# Patient Record
Sex: MALE | Race: WHITE | URBAN - METROPOLITAN AREA
[De-identification: names, ages, dates, MRNs, and addresses within clinical notes are randomized per-mention and may not be internally consistent; named-entity substitution may affect disease eponyms.]

---

## 2018-02-27 ENCOUNTER — APPOINTMENT (RX ONLY)
Dept: URBAN - METROPOLITAN AREA CLINIC 23 | Facility: CLINIC | Age: 73
Setting detail: DERMATOLOGY
End: 2018-02-27

## 2018-02-27 DIAGNOSIS — L738 OTHER SPECIFIED DISEASES OF HAIR AND HAIR FOLLICLES: ICD-10-CM

## 2018-02-27 DIAGNOSIS — L663 OTHER SPECIFIED DISEASES OF HAIR AND HAIR FOLLICLES: ICD-10-CM

## 2018-02-27 DIAGNOSIS — L73.9 FOLLICULAR DISORDER, UNSPECIFIED: ICD-10-CM

## 2018-02-27 DIAGNOSIS — D22 MELANOCYTIC NEVI: ICD-10-CM

## 2018-02-27 DIAGNOSIS — L82.0 INFLAMED SEBORRHEIC KERATOSIS: ICD-10-CM

## 2018-02-27 PROBLEM — J45.909 UNSPECIFIED ASTHMA, UNCOMPLICATED: Status: ACTIVE | Noted: 2018-02-27

## 2018-02-27 PROBLEM — L85.3 XEROSIS CUTIS: Status: ACTIVE | Noted: 2018-02-27

## 2018-02-27 PROBLEM — I25.10 ATHEROSCLEROTIC HEART DISEASE OF NATIVE CORONARY ARTERY WITHOUT ANGINA PECTORIS: Status: ACTIVE | Noted: 2018-02-27

## 2018-02-27 PROBLEM — I48.91 UNSPECIFIED ATRIAL FIBRILLATION: Status: ACTIVE | Noted: 2018-02-27

## 2018-02-27 PROBLEM — H91.90 UNSPECIFIED HEARING LOSS, UNSPECIFIED EAR: Status: ACTIVE | Noted: 2018-02-27

## 2018-02-27 PROBLEM — K21.9 GASTRO-ESOPHAGEAL REFLUX DISEASE WITHOUT ESOPHAGITIS: Status: ACTIVE | Noted: 2018-02-27

## 2018-02-27 PROBLEM — L02.223 FURUNCLE OF CHEST WALL: Status: ACTIVE | Noted: 2018-02-27

## 2018-02-27 PROBLEM — E78.5 HYPERLIPIDEMIA, UNSPECIFIED: Status: ACTIVE | Noted: 2018-02-27

## 2018-02-27 PROBLEM — D22.71 MELANOCYTIC NEVI OF RIGHT LOWER LIMB, INCLUDING HIP: Status: ACTIVE | Noted: 2018-02-27

## 2018-02-27 PROBLEM — D22.5 MELANOCYTIC NEVI OF TRUNK: Status: ACTIVE | Noted: 2018-02-27

## 2018-02-27 PROBLEM — J30.1 ALLERGIC RHINITIS DUE TO POLLEN: Status: ACTIVE | Noted: 2018-02-27

## 2018-02-27 PROCEDURE — 99203 OFFICE O/P NEW LOW 30 MIN: CPT | Mod: 25

## 2018-02-27 PROCEDURE — 17110 DESTRUCTION B9 LES UP TO 14: CPT

## 2018-02-27 PROCEDURE — ? OBSERVATION

## 2018-02-27 PROCEDURE — 11301 SHAVE SKIN LESION 0.6-1.0 CM: CPT | Mod: 59

## 2018-02-27 PROCEDURE — ? SHAVE REMOVAL

## 2018-02-27 PROCEDURE — ? LIQUID NITROGEN

## 2018-02-27 PROCEDURE — ? OBSERVATION AND MEASURE

## 2018-02-27 PROCEDURE — ? COUNSELING

## 2018-02-27 ASSESSMENT — LOCATION SIMPLE DESCRIPTION DERM
LOCATION SIMPLE: CHEST
LOCATION SIMPLE: RIGHT POSTERIOR THIGH
LOCATION SIMPLE: LEFT UPPER BACK
LOCATION SIMPLE: LEFT FOREARM
LOCATION SIMPLE: RIGHT PRETIBIAL REGION
LOCATION SIMPLE: SCALP
LOCATION SIMPLE: RIGHT UPPER BACK
LOCATION SIMPLE: LEFT LOWER BACK
LOCATION SIMPLE: RIGHT SCALP
LOCATION SIMPLE: ABDOMEN

## 2018-02-27 ASSESSMENT — LOCATION DETAILED DESCRIPTION DERM
LOCATION DETAILED: RIGHT SUPERIOR UPPER BACK
LOCATION DETAILED: LEFT DISTAL RADIAL DORSAL FOREARM
LOCATION DETAILED: RIGHT INFERIOR LATERAL UPPER BACK
LOCATION DETAILED: LEFT PROXIMAL DORSAL FOREARM
LOCATION DETAILED: RIGHT MID-UPPER BACK
LOCATION DETAILED: RIGHT CENTRAL FRONTAL SCALP
LOCATION DETAILED: LEFT INFERIOR UPPER BACK
LOCATION DETAILED: LEFT MEDIAL SUPERIOR CHEST
LOCATION DETAILED: RIGHT MEDIAL UPPER BACK
LOCATION DETAILED: RIGHT LATERAL DISTAL PRETIBIAL REGION
LOCATION DETAILED: RIGHT LATERAL ABDOMEN
LOCATION DETAILED: RIGHT PROXIMAL POSTERIOR THIGH
LOCATION DETAILED: RIGHT LATERAL FRONTAL SCALP
LOCATION DETAILED: LEFT SUPERIOR LATERAL MIDBACK

## 2018-02-27 ASSESSMENT — LOCATION ZONE DERM
LOCATION ZONE: ARM
LOCATION ZONE: LEG
LOCATION ZONE: TRUNK
LOCATION ZONE: SCALP

## 2018-02-27 NOTE — PROCEDURE: SHAVE REMOVAL
Render Post-Care Instructions In Note?: no
Size Of Lesion In Cm (Required): 0.7
Anesthesia Volume In Cc: 0.5
Wound Care: Vaseline
Path Notes (To The Dermatopathologist): Please check margins.
Accession #: skin Path
Hemostasis: Drysol
Anesthesia Type: 1% lidocaine without epinephrine and a 1:10 solution of 8.4% sodium bicarbonate
Medical Necessity Clause: This procedure was medically necessary because the lesion that was treated was:
Consent was obtained from the patient. The risks and benefits to therapy were discussed in detail. Specifically, the risks of infection, scarring, bleeding, prolonged wound healing, incomplete removal, allergy to anesthesia, nerve injury and recurrence were addressed. Prior to the procedure, the treatment site was clearly identified and confirmed by the patient. All components of Universal Protocol/PAUSE Rule completed.
Biopsy Method: Dermablade
Notification Instructions: Patient will be notified of biopsy results. However, patient instructed to call the office if not contacted within 2 weeks.
X Size Of Lesion In Cm (Optional): 0
Post-Care Instructions: I reviewed with the patient in detail post-care instructions. Patient is to keep the biopsy site dry overnight, and then apply bacitracin twice daily until healed. Patient may apply hydrogen peroxide soaks to remove any crusting.
Billing Type: Third-Party Bill
Medical Necessity Information: It is in your best interest to select a reason for this procedure from the list below. All of these items fulfill various CMS LCD requirements except the new and changing color options.
Detail Level: Detailed

## 2018-02-27 NOTE — PROCEDURE: LIQUID NITROGEN
Detail Level: Detailed
Number Of Freeze-Thaw Cycles: 1 freeze-thaw cycle
Medical Necessity Clause: This procedure was medically necessary because the lesions that were treated were: rubbed
Render Post-Care Instructions In Note?: no
Post-Care Instructions: I reviewed with the patient in detail post-care instructions. Patient is to wear sunprotection, and avoid picking at any of the treated lesions. Pt may apply Vaseline to crusted or scabbing areas.
Medical Necessity Information: It is in your best interest to select a reason for this procedure from the list below. All of these items fulfill various CMS LCD requirements except the new and changing color options.
Consent: The patient's verbal consent was obtained including but not limited to risks of crusting, scabbing, blistering, scarring, darker or lighter pigmentary change, recurrence, incomplete removal and infection.

## 2018-02-27 NOTE — PROCEDURE: MIPS QUALITY
Quality 130: Documentation Of Current Medications In The Medical Record: Current Medications Documented
Quality 110: Preventive Care And Screening: Influenza Immunization: Influenza Immunization previously received during influenza season
Detail Level: Simple

## 2018-06-05 ENCOUNTER — HOSPITAL ENCOUNTER (OUTPATIENT)
Dept: LAB | Age: 73
Discharge: HOME | End: 2018-06-05
Attending: INTERNAL MEDICINE
Payer: MEDICARE

## 2018-06-05 DIAGNOSIS — E78.5 DYSLIPIDEMIA: Chronic | ICD-10-CM

## 2018-06-05 LAB
ALBUMIN SERPL-MCNC: 3.6 G/DL (ref 3.2–4.6)
ALBUMIN/GLOB SERPL: 1.2 {RATIO}
ALP SERPL-CCNC: 48 U/L (ref 50–136)
ALT SERPL-CCNC: 19 U/L (ref 12–65)
AST SERPL-CCNC: 15 U/L (ref 15–37)
BILIRUB DIRECT SERPL-MCNC: 0.2 MG/DL
BILIRUB SERPL-MCNC: 1 MG/DL (ref 0.2–1.1)
CHOLEST SERPL-MCNC: 226 MG/DL
CRP SERPL-MCNC: 5.2 MG/DL (ref 0–0.9)
GLOBULIN SER CALC-MCNC: 3.1 G/DL
HDLC SERPL-MCNC: 52 MG/DL (ref 40–60)
HDLC SERPL: 4.3 {RATIO}
LDLC SERPL CALC-MCNC: 146.6 MG/DL
LIPID PROFILE,FLP: ABNORMAL
PROT SERPL-MCNC: 6.7 G/DL (ref 6.3–8.2)
TRIGL SERPL-MCNC: 137 MG/DL (ref 35–150)
VLDLC SERPL CALC-MCNC: 27.4 MG/DL (ref 6–23)

## 2018-06-05 PROCEDURE — 86140 C-REACTIVE PROTEIN: CPT | Performed by: INTERNAL MEDICINE

## 2018-06-05 PROCEDURE — 36415 COLL VENOUS BLD VENIPUNCTURE: CPT | Performed by: INTERNAL MEDICINE

## 2018-06-05 PROCEDURE — 80076 HEPATIC FUNCTION PANEL: CPT | Performed by: INTERNAL MEDICINE

## 2018-06-05 PROCEDURE — 80061 LIPID PANEL: CPT | Performed by: INTERNAL MEDICINE

## 2018-06-07 NOTE — PROGRESS NOTES
Pt informed/revewed lab results. Pt states that he is currently not taking the Livalo. States he wanted to wait until lab results were back. Pt states that he will start taking the livalo,  Requested a copy of lab results. Mailed copy of labs to pt.

## 2018-12-13 ENCOUNTER — HOSPITAL ENCOUNTER (OUTPATIENT)
Dept: ULTRASOUND IMAGING | Age: 73
Discharge: HOME | End: 2018-12-13
Attending: FAMILY MEDICINE
Payer: MEDICARE

## 2018-12-13 ENCOUNTER — HOSPITAL ENCOUNTER (OUTPATIENT)
Dept: NUCLEAR MEDICINE | Age: 73
Discharge: HOME | End: 2018-12-13
Attending: FAMILY MEDICINE
Payer: MEDICARE

## 2018-12-13 DIAGNOSIS — R10.13 EPIGASTRIC PAIN: ICD-10-CM

## 2018-12-13 PROCEDURE — 74011250636 HC RX REV CODE- 250/636: Performed by: FAMILY MEDICINE

## 2018-12-13 PROCEDURE — 78227 HEPATOBIL SYST IMAGE W/DRUG: CPT

## 2018-12-13 PROCEDURE — 76700 US EXAM ABDOM COMPLETE: CPT

## 2018-12-13 RX ADMIN — SINCALIDE 1.88 MCG: 5 INJECTION, POWDER, LYOPHILIZED, FOR SOLUTION INTRAVENOUS at 11:15

## 2018-12-14 NOTE — PROGRESS NOTES
Per Dr. Mendez Lyme: Shows a cyst on liver; unsure if this is related to symptoms. Recommend follow-up with GI as planned. - Notified, verbalizes understanding. Appt scheduled with GI next month.

## 2018-12-14 NOTE — PROGRESS NOTES
Per Dr. Gonzalez Senters: Gallbladder function is normal. See Abd US results. - Notified, verbalizes understanding.

## 2019-01-03 ENCOUNTER — HOSPITAL ENCOUNTER (OUTPATIENT)
Dept: LAB | Age: 74
Discharge: HOME | End: 2019-01-03
Payer: MEDICARE

## 2019-01-03 DIAGNOSIS — R06.09 DOE (DYSPNEA ON EXERTION): ICD-10-CM

## 2019-01-03 LAB — BNP SERPL-MCNC: 141 PG/ML

## 2019-01-03 PROCEDURE — 36415 COLL VENOUS BLD VENIPUNCTURE: CPT

## 2019-01-03 PROCEDURE — 83880 ASSAY OF NATRIURETIC PEPTIDE: CPT

## 2019-01-21 ENCOUNTER — HOSPITAL ENCOUNTER (OUTPATIENT)
Dept: MRI IMAGING | Age: 74
Discharge: HOME | End: 2019-01-21
Attending: NURSE PRACTITIONER
Payer: MEDICARE

## 2019-01-21 DIAGNOSIS — K76.9 LIVER DISEASE: ICD-10-CM

## 2019-01-21 PROCEDURE — A9575 INJ GADOTERATE MEGLUMI 0.1ML: HCPCS | Performed by: NURSE PRACTITIONER

## 2019-01-21 PROCEDURE — 74183 MRI ABD W/O CNTR FLWD CNTR: CPT

## 2019-01-21 PROCEDURE — 74011000258 HC RX REV CODE- 258: Performed by: NURSE PRACTITIONER

## 2019-01-21 PROCEDURE — 74011250636 HC RX REV CODE- 250/636: Performed by: NURSE PRACTITIONER

## 2019-01-21 RX ORDER — SODIUM CHLORIDE 0.9 % (FLUSH) 0.9 %
10 SYRINGE (ML) INJECTION
Status: COMPLETED | OUTPATIENT
Start: 2019-01-21 | End: 2019-01-21

## 2019-01-21 RX ORDER — GADOTERATE MEGLUMINE 376.9 MG/ML
19 INJECTION INTRAVENOUS
Status: COMPLETED | OUTPATIENT
Start: 2019-01-21 | End: 2019-01-21

## 2019-01-21 RX ADMIN — GADOTERATE MEGLUMINE 19 ML: 376.9 INJECTION INTRAVENOUS at 08:31

## 2019-01-21 RX ADMIN — Medication 10 ML: at 08:31

## 2019-01-21 RX ADMIN — SODIUM CHLORIDE 100 ML: 900 INJECTION, SOLUTION INTRAVENOUS at 08:31

## 2019-02-06 ENCOUNTER — HOSPITAL ENCOUNTER (OUTPATIENT)
Dept: LAB | Age: 74
Discharge: HOME | End: 2019-02-06

## 2019-02-06 PROCEDURE — 88305 TISSUE EXAM BY PATHOLOGIST: CPT

## 2019-02-06 PROCEDURE — 88312 SPECIAL STAINS GROUP 1: CPT

## 2019-03-07 ENCOUNTER — HOSPITAL ENCOUNTER (OUTPATIENT)
Dept: CT IMAGING | Age: 74
Discharge: HOME | End: 2019-03-07
Attending: INTERNAL MEDICINE
Payer: MEDICARE

## 2019-03-07 DIAGNOSIS — K57.92 DIVERTICULITIS: ICD-10-CM

## 2019-03-07 DIAGNOSIS — K57.30 DIVERTICULOSIS, SIGMOID: ICD-10-CM

## 2019-03-07 DIAGNOSIS — R10.30 LOWER ABDOMINAL PAIN: ICD-10-CM

## 2019-03-07 LAB — CREAT BLD-MCNC: 1.4 MG/DL (ref 0.8–1.5)

## 2019-03-07 PROCEDURE — 82565 ASSAY OF CREATININE: CPT

## 2019-03-07 RX ORDER — SODIUM CHLORIDE 0.9 % (FLUSH) 0.9 %
10 SYRINGE (ML) INJECTION
Status: COMPLETED | OUTPATIENT
Start: 2019-03-07 | End: 2019-03-07

## 2019-03-07 RX ADMIN — Medication 10 ML: at 14:01

## 2019-07-22 PROBLEM — I70.0 CALCIFICATION OF ABDOMINAL AORTA (HCC): Status: ACTIVE | Noted: 2019-07-22

## 2019-08-28 ENCOUNTER — HOSPITAL ENCOUNTER (OUTPATIENT)
Dept: LAB | Age: 74
Discharge: HOME | End: 2019-08-28
Payer: MEDICARE

## 2019-08-28 DIAGNOSIS — E78.5 DYSLIPIDEMIA: Chronic | ICD-10-CM

## 2019-08-28 LAB
CHOLEST SERPL-MCNC: 295 MG/DL
HDLC SERPL-MCNC: 56 MG/DL (ref 40–60)
HDLC SERPL: 5.3 {RATIO}
LDLC SERPL CALC-MCNC: 195.2 MG/DL
LIPID PROFILE,FLP: ABNORMAL
TRIGL SERPL-MCNC: 219 MG/DL (ref 35–150)
VLDLC SERPL CALC-MCNC: 43.8 MG/DL (ref 6–23)

## 2019-08-28 PROCEDURE — 80061 LIPID PANEL: CPT

## 2019-08-28 PROCEDURE — 36415 COLL VENOUS BLD VENIPUNCTURE: CPT

## 2019-09-06 PROBLEM — R94.39 ABNORMAL NUCLEAR STRESS TEST: Status: ACTIVE | Noted: 2019-09-06

## 2019-09-15 NOTE — PROGRESS NOTES
Called to pre-assess for [facility] poss with Dr Maida Lin , Scheduled 9/16/19 at 9:30am. No answer & message left.

## 2019-09-16 ENCOUNTER — HOSPITAL ENCOUNTER (OUTPATIENT)
Dept: CARDIAC CATH/INVASIVE PROCEDURES | Age: 74
Discharge: HOME | End: 2019-09-17
Attending: INTERNAL MEDICINE | Admitting: INTERNAL MEDICINE
Payer: MEDICARE

## 2019-09-16 PROBLEM — I25.10 CAD (CORONARY ARTERY DISEASE): Status: ACTIVE | Noted: 2019-09-16

## 2019-09-16 LAB
ALBUMIN SERPL-MCNC: 3.8 G/DL (ref 3.2–4.6)
ALBUMIN/GLOB SERPL: 1.3 {RATIO} (ref 1.2–3.5)
ALP SERPL-CCNC: 45 U/L (ref 50–136)
ALT SERPL-CCNC: 21 U/L (ref 12–65)
ANION GAP SERPL CALC-SCNC: 5 MMOL/L (ref 7–16)
AST SERPL-CCNC: 18 U/L (ref 15–37)
ATRIAL RATE: 60 BPM
ATRIAL RATE: 63 BPM
BILIRUB SERPL-MCNC: 0.5 MG/DL (ref 0.2–1.1)
BUN SERPL-MCNC: 19 MG/DL (ref 8–23)
CALCIUM SERPL-MCNC: 9.2 MG/DL (ref 8.3–10.4)
CALCULATED P AXIS, ECG09: 50 DEGREES
CALCULATED P AXIS, ECG09: 58 DEGREES
CALCULATED R AXIS, ECG10: -55 DEGREES
CALCULATED R AXIS, ECG10: -60 DEGREES
CALCULATED T AXIS, ECG11: -37 DEGREES
CALCULATED T AXIS, ECG11: 54 DEGREES
CHLORIDE SERPL-SCNC: 108 MMOL/L (ref 98–107)
CO2 SERPL-SCNC: 30 MMOL/L (ref 21–32)
CREAT SERPL-MCNC: 1.15 MG/DL (ref 0.8–1.5)
DIAGNOSIS, 93000: NORMAL
DIAGNOSIS, 93000: NORMAL
ERYTHROCYTE [DISTWIDTH] IN BLOOD BY AUTOMATED COUNT: 13.1 % (ref 11.9–14.6)
GLOBULIN SER CALC-MCNC: 3 G/DL (ref 2.3–3.5)
GLUCOSE SERPL-MCNC: 98 MG/DL (ref 65–100)
HCT VFR BLD AUTO: 42.3 % (ref 41.1–50.3)
HGB BLD-MCNC: 14.2 G/DL (ref 13.6–17.2)
INR PPP: 0.9
MCH RBC QN AUTO: 29.2 PG (ref 26.1–32.9)
MCHC RBC AUTO-ENTMCNC: 33.6 G/DL (ref 31.4–35)
MCV RBC AUTO: 86.9 FL (ref 79.6–97.8)
NRBC # BLD: 0 K/UL (ref 0–0.2)
P-R INTERVAL, ECG05: 158 MS
P-R INTERVAL, ECG05: 170 MS
PLATELET # BLD AUTO: 233 K/UL (ref 150–450)
PMV BLD AUTO: 9.3 FL (ref 9.4–12.3)
POTASSIUM SERPL-SCNC: 3.9 MMOL/L (ref 3.5–5.1)
PROT SERPL-MCNC: 6.8 G/DL (ref 6.3–8.2)
PROTHROMBIN TIME: 12.6 SEC (ref 11.7–14.5)
Q-T INTERVAL, ECG07: 428 MS
Q-T INTERVAL, ECG07: 438 MS
QRS DURATION, ECG06: 102 MS
QRS DURATION, ECG06: 114 MS
QTC CALCULATION (BEZET), ECG08: 437 MS
QTC CALCULATION (BEZET), ECG08: 438 MS
RBC # BLD AUTO: 4.87 M/UL (ref 4.23–5.6)
SODIUM SERPL-SCNC: 143 MMOL/L (ref 136–145)
VENTRICULAR RATE, ECG03: 60 BPM
VENTRICULAR RATE, ECG03: 63 BPM
WBC # BLD AUTO: 6.8 K/UL (ref 4.3–11.1)

## 2019-09-16 PROCEDURE — C1894 INTRO/SHEATH, NON-LASER: HCPCS

## 2019-09-16 PROCEDURE — 77030004559 HC CATH ANGI DX SUPT CARD -B

## 2019-09-16 PROCEDURE — C1887 CATHETER, GUIDING: HCPCS

## 2019-09-16 PROCEDURE — 74011250637 HC RX REV CODE- 250/637: Performed by: INTERNAL MEDICINE

## 2019-09-16 PROCEDURE — 92937 PRQ TRLUML REVSC CAB GRF 1: CPT

## 2019-09-16 PROCEDURE — 74011000250 HC RX REV CODE- 250: Performed by: INTERNAL MEDICINE

## 2019-09-16 PROCEDURE — 99152 MOD SED SAME PHYS/QHP 5/>YRS: CPT

## 2019-09-16 PROCEDURE — 92978 ENDOLUMINL IVUS OCT C 1ST: CPT

## 2019-09-16 PROCEDURE — C1760 CLOSURE DEV, VASC: HCPCS

## 2019-09-16 PROCEDURE — 93005 ELECTROCARDIOGRAM TRACING: CPT | Performed by: INTERNAL MEDICINE

## 2019-09-16 PROCEDURE — 74011000258 HC RX REV CODE- 258: Performed by: INTERNAL MEDICINE

## 2019-09-16 PROCEDURE — 80053 COMPREHEN METABOLIC PANEL: CPT

## 2019-09-16 PROCEDURE — 85027 COMPLETE CBC AUTOMATED: CPT

## 2019-09-16 PROCEDURE — C1874 STENT, COATED/COV W/DEL SYS: HCPCS

## 2019-09-16 PROCEDURE — 74011636320 HC RX REV CODE- 636/320: Performed by: INTERNAL MEDICINE

## 2019-09-16 PROCEDURE — C1884 EMBOLIZATION PROTECT SYST: HCPCS

## 2019-09-16 PROCEDURE — C1769 GUIDE WIRE: HCPCS

## 2019-09-16 PROCEDURE — 99153 MOD SED SAME PHYS/QHP EA: CPT

## 2019-09-16 PROCEDURE — C1753 CATH, INTRAVAS ULTRASOUND: HCPCS

## 2019-09-16 PROCEDURE — 85610 PROTHROMBIN TIME: CPT

## 2019-09-16 PROCEDURE — 77030004558 HC CATH ANGI DX SUPR TORQ CARD -A

## 2019-09-16 PROCEDURE — 74011250636 HC RX REV CODE- 250/636

## 2019-09-16 PROCEDURE — 74011250636 HC RX REV CODE- 250/636: Performed by: INTERNAL MEDICINE

## 2019-09-16 PROCEDURE — 93459 L HRT ART/GRFT ANGIO: CPT

## 2019-09-16 RX ORDER — CLOPIDOGREL BISULFATE 75 MG/1
75 TABLET ORAL DAILY
Status: DISCONTINUED | OUTPATIENT
Start: 2019-09-17 | End: 2019-09-17 | Stop reason: HOSPADM

## 2019-09-16 RX ORDER — PAROXETINE HYDROCHLORIDE 20 MG/1
20 TABLET, FILM COATED ORAL EVERY EVENING
Status: DISCONTINUED | OUTPATIENT
Start: 2019-09-16 | End: 2019-09-17 | Stop reason: HOSPADM

## 2019-09-16 RX ORDER — MIDAZOLAM HYDROCHLORIDE 1 MG/ML
.5-2 INJECTION, SOLUTION INTRAMUSCULAR; INTRAVENOUS
Status: DISCONTINUED | OUTPATIENT
Start: 2019-09-16 | End: 2019-09-16 | Stop reason: HOSPADM

## 2019-09-16 RX ORDER — CLOPIDOGREL BISULFATE 75 MG/1
300 TABLET ORAL ONCE
Status: COMPLETED | OUTPATIENT
Start: 2019-09-16 | End: 2019-09-16

## 2019-09-16 RX ORDER — RANITIDINE 150 MG/1
150 TABLET, FILM COATED ORAL 2 TIMES DAILY
Status: DISCONTINUED | OUTPATIENT
Start: 2019-09-16 | End: 2019-09-17 | Stop reason: HOSPADM

## 2019-09-16 RX ORDER — SODIUM CHLORIDE 0.9 % (FLUSH) 0.9 %
5-40 SYRINGE (ML) INJECTION EVERY 8 HOURS
Status: DISCONTINUED | OUTPATIENT
Start: 2019-09-16 | End: 2019-09-17 | Stop reason: HOSPADM

## 2019-09-16 RX ORDER — LANOLIN ALCOHOL/MO/W.PET/CERES
400 CREAM (GRAM) TOPICAL DAILY
Status: DISCONTINUED | OUTPATIENT
Start: 2019-09-17 | End: 2019-09-17 | Stop reason: HOSPADM

## 2019-09-16 RX ORDER — GUAIFENESIN 100 MG/5ML
81-324 LIQUID (ML) ORAL ONCE
Status: COMPLETED | OUTPATIENT
Start: 2019-09-16 | End: 2019-09-16

## 2019-09-16 RX ORDER — CLOPIDOGREL BISULFATE 75 MG/1
75 TABLET ORAL
Status: COMPLETED | OUTPATIENT
Start: 2019-09-16 | End: 2019-09-16

## 2019-09-16 RX ORDER — FAMOTIDINE 20 MG/1
20 TABLET, FILM COATED ORAL 2 TIMES DAILY
Status: DISCONTINUED | OUTPATIENT
Start: 2019-09-16 | End: 2019-09-17 | Stop reason: HOSPADM

## 2019-09-16 RX ORDER — GUAIFENESIN 100 MG/5ML
81 LIQUID (ML) ORAL DAILY
Status: DISCONTINUED | OUTPATIENT
Start: 2019-09-17 | End: 2019-09-17 | Stop reason: HOSPADM

## 2019-09-16 RX ORDER — POTASSIUM CHLORIDE 20 MEQ/1
20 TABLET, EXTENDED RELEASE ORAL
Status: DISCONTINUED | OUTPATIENT
Start: 2019-09-16 | End: 2019-09-17 | Stop reason: HOSPADM

## 2019-09-16 RX ORDER — LORAZEPAM 1 MG/1
1 TABLET ORAL
Status: DISCONTINUED | OUTPATIENT
Start: 2019-09-16 | End: 2019-09-16 | Stop reason: SDUPTHER

## 2019-09-16 RX ORDER — ACETAMINOPHEN 325 MG/1
650 TABLET ORAL
Status: DISCONTINUED | OUTPATIENT
Start: 2019-09-16 | End: 2019-09-17 | Stop reason: HOSPADM

## 2019-09-16 RX ORDER — LORAZEPAM 1 MG/1
1 TABLET ORAL
Status: DISCONTINUED | OUTPATIENT
Start: 2019-09-16 | End: 2019-09-17 | Stop reason: HOSPADM

## 2019-09-16 RX ORDER — HEPARIN SODIUM 200 [USP'U]/100ML
2 INJECTION, SOLUTION INTRAVENOUS CONTINUOUS
Status: DISCONTINUED | OUTPATIENT
Start: 2019-09-16 | End: 2019-09-16 | Stop reason: HOSPADM

## 2019-09-16 RX ORDER — MONTELUKAST SODIUM 10 MG/1
10 TABLET ORAL DAILY
Status: DISCONTINUED | OUTPATIENT
Start: 2019-09-17 | End: 2019-09-17 | Stop reason: HOSPADM

## 2019-09-16 RX ORDER — MAG HYDROX/ALUMINUM HYD/SIMETH 200-200-20
30 SUSPENSION, ORAL (FINAL DOSE FORM) ORAL ONCE
Status: COMPLETED | OUTPATIENT
Start: 2019-09-16 | End: 2019-09-16

## 2019-09-16 RX ORDER — SODIUM CHLORIDE 9 MG/ML
75 INJECTION, SOLUTION INTRAVENOUS CONTINUOUS
Status: DISCONTINUED | OUTPATIENT
Start: 2019-09-16 | End: 2019-09-16 | Stop reason: HOSPADM

## 2019-09-16 RX ORDER — SODIUM CHLORIDE 9 MG/ML
75 INJECTION, SOLUTION INTRAVENOUS CONTINUOUS
Status: DISCONTINUED | OUTPATIENT
Start: 2019-09-16 | End: 2019-09-17 | Stop reason: HOSPADM

## 2019-09-16 RX ORDER — DIAZEPAM 5 MG/1
5 TABLET ORAL ONCE
Status: COMPLETED | OUTPATIENT
Start: 2019-09-16 | End: 2019-09-16

## 2019-09-16 RX ORDER — LIDOCAINE HYDROCHLORIDE 10 MG/ML
3-10 INJECTION INFILTRATION; PERINEURAL
Status: DISCONTINUED | OUTPATIENT
Start: 2019-09-16 | End: 2019-09-16 | Stop reason: HOSPADM

## 2019-09-16 RX ORDER — NITROGLYCERIN 0.4 MG/1
0.4 TABLET SUBLINGUAL
Status: DISCONTINUED | OUTPATIENT
Start: 2019-09-16 | End: 2019-09-17 | Stop reason: HOSPADM

## 2019-09-16 RX ORDER — FENTANYL CITRATE 50 UG/ML
25-100 INJECTION, SOLUTION INTRAMUSCULAR; INTRAVENOUS
Status: DISCONTINUED | OUTPATIENT
Start: 2019-09-16 | End: 2019-09-16 | Stop reason: HOSPADM

## 2019-09-16 RX ORDER — SODIUM CHLORIDE 0.9 % (FLUSH) 0.9 %
5-40 SYRINGE (ML) INJECTION AS NEEDED
Status: DISCONTINUED | OUTPATIENT
Start: 2019-09-16 | End: 2019-09-17 | Stop reason: HOSPADM

## 2019-09-16 RX ORDER — NITROGLYCERIN 0.4 MG/1
0.4 TABLET SUBLINGUAL
Status: DISCONTINUED | OUTPATIENT
Start: 2019-09-16 | End: 2019-09-16 | Stop reason: SDUPTHER

## 2019-09-16 RX ADMIN — MIDAZOLAM HYDROCHLORIDE 1 MG: 1 INJECTION, SOLUTION INTRAMUSCULAR; INTRAVENOUS at 10:54

## 2019-09-16 RX ADMIN — CEFAZOLIN 1 G: 1 INJECTION, POWDER, FOR SOLUTION INTRAMUSCULAR; INTRAVENOUS at 11:26

## 2019-09-16 RX ADMIN — CLOPIDOGREL BISULFATE 75 MG: 75 TABLET ORAL at 08:16

## 2019-09-16 RX ADMIN — IOPAMIDOL 145 ML: 755 INJECTION, SOLUTION INTRAVENOUS at 11:30

## 2019-09-16 RX ADMIN — Medication 10 ML: at 17:31

## 2019-09-16 RX ADMIN — ASPIRIN 81 MG 324 MG: 81 TABLET ORAL at 08:16

## 2019-09-16 RX ADMIN — PAROXETINE HYDROCHLORIDE 20 MG: 20 TABLET, FILM COATED ORAL at 20:42

## 2019-09-16 RX ADMIN — Medication 10 ML: at 20:44

## 2019-09-16 RX ADMIN — MIDAZOLAM HYDROCHLORIDE 2 MG: 1 INJECTION, SOLUTION INTRAMUSCULAR; INTRAVENOUS at 10:30

## 2019-09-16 RX ADMIN — ALUMINUM HYDROXIDE, MAGNESIUM HYDROXIDE, AND SIMETHICONE 30 ML: 200; 200; 20 SUSPENSION ORAL at 11:30

## 2019-09-16 RX ADMIN — RANITIDINE 150 MG: 150 TABLET, FILM COATED ORAL at 20:42

## 2019-09-16 RX ADMIN — LIDOCAINE HYDROCHLORIDE 10 ML: 10 INJECTION, SOLUTION INFILTRATION; PERINEURAL at 10:30

## 2019-09-16 RX ADMIN — BIVALIRUDIN 1.75 MG/KG/HR: 250 INJECTION, POWDER, LYOPHILIZED, FOR SOLUTION INTRAVENOUS at 10:48

## 2019-09-16 RX ADMIN — CLOPIDOGREL BISULFATE 300 MG: 75 TABLET ORAL at 11:30

## 2019-09-16 RX ADMIN — FENTANYL CITRATE 25 MCG: 50 INJECTION, SOLUTION INTRAMUSCULAR; INTRAVENOUS at 10:30

## 2019-09-16 RX ADMIN — SODIUM CHLORIDE 75 ML/HR: 900 INJECTION, SOLUTION INTRAVENOUS at 08:17

## 2019-09-16 RX ADMIN — HEPARIN SODIUM 2 UNITS/HR: 5000 INJECTION, SOLUTION INTRAVENOUS; SUBCUTANEOUS at 10:05

## 2019-09-16 RX ADMIN — FENTANYL CITRATE 25 MCG: 50 INJECTION, SOLUTION INTRAMUSCULAR; INTRAVENOUS at 10:54

## 2019-09-16 RX ADMIN — DIAZEPAM 5 MG: 5 TABLET ORAL at 08:16

## 2019-09-16 NOTE — PROGRESS NOTES
Bedside and Verbal shift change report given to self (oncoming nurse) by [place] (offgoing nurse). Report included the following information SBAR, Kardex, MAR and Recent Results.      Right groin site CDI

## 2019-09-16 NOTE — PROGRESS NOTES
Care Management Interventions  PCP Verified by CM: Yes  Last Visit to PCP: 08/21/19  Palliative Care Criteria Met (RRAT>21 & CHF Dx)?: No(RRAT 7 Dx CAD)  Transition of Care Consult (CM Consult): Discharge Planning  Discharge Durable Medical Equipment: No(B/P cuff)  Physical Therapy Consult: No  Occupational Therapy Consult: No  Speech Therapy Consult: No  Patient S/P cardiac cath with PCI. Will follow up in am for any d/c needs or concerns as patient is speaking with physician. CM following.

## 2019-09-16 NOTE — PROGRESS NOTES
TRANSFER - IN REPORT:    Verbal report received from Lesvia Brand, [facility] on Lala Chavira. being received from [address] [place] [address] for routine post - op      Report consisted of patients Situation, Background, Assessment and Recommendations(SBAR). Information from the following report(s) SBAR and MAR was reviewed with the receiving nurse. Opportunity for questions and clarification was provided. Assessment completed upon patients arrival to unit and care assumed. 09/16/19 1230   Dual Skin Pressure Injury Assessment   Dual Skin Pressure Injury Assessment WDL   Second Care Provider (Based on [address] [place] [address]) Lorna Tanner RN   Skin Integumentary   Skin Integumentary (WDL) X    Pressure  Injury Documentation No Pressure Injury Noted-Pressure Ulcer Prevention Initiated   Skin Color Appropriate for ethnicity   Skin Condition/Temp Warm;Dry   Skin Integrity Other (comment)  (R. radial cath exit site)   Turgor Non-tenting   Hair Growth Present   Varicosities Absent   Dual Rn skin assessment completed. Patient's skin noted to be warm and dry with a right groin cath exit site-dressing c/d/i. Skin intact over all pressure points. No noted wounds or skin breakdown. Discussed with patient outpatient status and the right to take home medications.  Patient's wife to bring patient's medications to hospital.

## 2019-09-16 NOTE — PROCEDURES
Brief Cardiac Procedure Note    Patient: Patricia Fuentes MRN: [id number]  SSN: [id number]    YOB: 1945  Age: 69 y.o. Sex: male      Date of Procedure: 9/16/2019     Pre-procedure Diagnosis: Typical Angina    Post-procedure Diagnosis: Coronary Artery Disease    Reason for Procedure: New Onset Angina < or = 2 Months    Procedure: Left Heart Catheterization with Percutaneous Coronary Intervention    Brief Description of Procedure: lhc via right femoral, svg times two, lima- pci svg rca    Performed By: Lizy Marshall MD     Assistants: none    Anesthesia: Moderate Sedation    Estimated Blood Loss: Less than 10 mL      Specimens: None    Implants: None    Findings: sridhar times two svg rca, nl lvef, 2/3 patent grafts    Complications: None    Recommendations: Continue medical therapy.     Signed By: Lizy Marshall MD     September 16, 2019

## 2019-09-16 NOTE — PROGRESS NOTES
Right groin unremarkable without hematoma or bleeding. Bedrest complete. Patient assisted to stand and ambulate in room. No changes in groin site. Patient tolerated well. Assist x 1 would be appropriate for this patient (IV pole). Call light in reach and patient verbalizes understand to call when needing assistance OOB. Will continue to monitor.

## 2019-09-16 NOTE — PROGRESS NOTES
TRANSFER - OUT REPORT:    Verbal report given to [facility] RN(name) on [facility].  being transferred to tele 308(unit) for routine progression of care       Report consisted of patients Situation, Background, Assessment and   Recommendations(SBAR). Information from the following report(s) Procedure Summary was reviewed with the receiving nurse. Lines:   Peripheral IV 09/16/19 Right Antecubital (Active)       Peripheral IV 09/16/19 Left Hand (Active)        Opportunity for questions and clarification was provided.       Patient transported with:   Registered Nurse

## 2019-09-16 NOTE — PROGRESS NOTES
Pt arrived, ambulated to room with no visible problems, planned C for Dr Shelley Martin. Consent signed, Procedure discussed with pt all questions answered voiced understanding. Medications and history discussed with pt. Pt prepped per ordersThe patient has a fraility score of 4-VULNERABLE, based on ability to complete ADLS without assistance, increased symptoms on exertion.     Pt given 324mg ASA on arrival.

## 2019-09-16 NOTE — PROGRESS NOTES
TRANSFER - OUT REPORT:    [facility] Dr Yara Perkins  RFA closed with mynx  Stents x 2 SVG RCA  Versed 3 mg  Fentanyl 50 mcg  Angiomax off at 1130  Plavix 300 mg  Mylanta 30 ml  Ancef 1 g  No bleeding/hematoma  Pt is a/o denies complaints    Verbal report given to lorena(name) on Marrie Bryan.  being transferred to cpru(unit) for routine progression of care       Report consisted of patients Situation, Background, Assessment and   Recommendations(SBAR). Information from the following report(s) SBAR and Procedure Summary was reviewed with the receiving nurse. Lines:   Peripheral IV 09/16/19 Right Antecubital (Active)       Peripheral IV 09/16/19 Left Hand (Active)        Opportunity for questions and clarification was provided.

## 2019-09-16 NOTE — PROGRESS NOTES
TRANSFER - IN REPORT:    Verbal report received from [facility]) on Marcia Sprague.  being received from cath lab(unit) for routine progression of care      Report consisted of patients Situation, Background, Assessment and   Recommendations(SBAR). Information from the following report(s) Procedure Summary was reviewed with the receiving nurse. Opportunity for questions and clarification was provided. Assessment completed upon patients arrival to unit and care assumed.

## 2019-09-17 VITALS
HEIGHT: 70 IN | TEMPERATURE: 97.8 F | WEIGHT: 223.9 LBS | HEART RATE: 64 BPM | OXYGEN SATURATION: 96 % | BODY MASS INDEX: 32.05 KG/M2 | RESPIRATION RATE: 18 BRPM | SYSTOLIC BLOOD PRESSURE: 127 MMHG | DIASTOLIC BLOOD PRESSURE: 74 MMHG

## 2019-09-17 LAB
ANION GAP SERPL CALC-SCNC: 5 MMOL/L (ref 7–16)
ATRIAL RATE: 59 BPM
BASOPHILS # BLD: 0 K/UL (ref 0–0.2)
BASOPHILS NFR BLD: 0 % (ref 0–2)
BUN SERPL-MCNC: 15 MG/DL (ref 8–23)
CALCIUM SERPL-MCNC: 8.7 MG/DL (ref 8.3–10.4)
CALCULATED P AXIS, ECG09: 40 DEGREES
CALCULATED R AXIS, ECG10: -60 DEGREES
CALCULATED T AXIS, ECG11: 33 DEGREES
CHLORIDE SERPL-SCNC: 107 MMOL/L (ref 98–107)
CHOLEST SERPL-MCNC: 238 MG/DL
CO2 SERPL-SCNC: 30 MMOL/L (ref 21–32)
CREAT SERPL-MCNC: 1.04 MG/DL (ref 0.8–1.5)
DIAGNOSIS, 93000: NORMAL
DIFFERENTIAL METHOD BLD: ABNORMAL
EOSINOPHIL # BLD: 0.3 K/UL (ref 0–0.8)
EOSINOPHIL NFR BLD: 3 % (ref 0.5–7.8)
ERYTHROCYTE [DISTWIDTH] IN BLOOD BY AUTOMATED COUNT: 13 % (ref 11.9–14.6)
GLUCOSE SERPL-MCNC: 96 MG/DL (ref 65–100)
HCT VFR BLD AUTO: 41.7 % (ref 41.1–50.3)
HDLC SERPL-MCNC: 48 MG/DL (ref 40–60)
HDLC SERPL: 5 {RATIO}
HGB BLD-MCNC: 13.8 G/DL (ref 13.6–17.2)
IMM GRANULOCYTES # BLD AUTO: 0 K/UL (ref 0–0.5)
IMM GRANULOCYTES NFR BLD AUTO: 0 % (ref 0–5)
LDLC SERPL CALC-MCNC: 146 MG/DL
LIPID PROFILE,FLP: ABNORMAL
LYMPHOCYTES # BLD: 1.9 K/UL (ref 0.5–4.6)
LYMPHOCYTES NFR BLD: 24 % (ref 13–44)
MCH RBC QN AUTO: 28.9 PG (ref 26.1–32.9)
MCHC RBC AUTO-ENTMCNC: 33.1 G/DL (ref 31.4–35)
MCV RBC AUTO: 87.4 FL (ref 79.6–97.8)
MONOCYTES # BLD: 0.9 K/UL (ref 0.1–1.3)
MONOCYTES NFR BLD: 11 % (ref 4–12)
NEUTS SEG # BLD: 5 K/UL (ref 1.7–8.2)
NEUTS SEG NFR BLD: 61 % (ref 43–78)
NRBC # BLD: 0 K/UL (ref 0–0.2)
P-R INTERVAL, ECG05: 152 MS
PLATELET # BLD AUTO: 202 K/UL (ref 150–450)
PMV BLD AUTO: 8.9 FL (ref 9.4–12.3)
POTASSIUM SERPL-SCNC: 3.7 MMOL/L (ref 3.5–5.1)
Q-T INTERVAL, ECG07: 446 MS
QRS DURATION, ECG06: 112 MS
QTC CALCULATION (BEZET), ECG08: 441 MS
RBC # BLD AUTO: 4.77 M/UL (ref 4.23–5.6)
SODIUM SERPL-SCNC: 142 MMOL/L (ref 136–145)
TRIGL SERPL-MCNC: 220 MG/DL (ref 35–150)
VENTRICULAR RATE, ECG03: 59 BPM
VLDLC SERPL CALC-MCNC: 44 MG/DL (ref 6–23)
WBC # BLD AUTO: 8.1 K/UL (ref 4.3–11.1)

## 2019-09-17 PROCEDURE — 90686 IIV4 VACC NO PRSV 0.5 ML IM: CPT | Performed by: INTERNAL MEDICINE

## 2019-09-17 PROCEDURE — 93005 ELECTROCARDIOGRAM TRACING: CPT | Performed by: INTERNAL MEDICINE

## 2019-09-17 PROCEDURE — 74011250636 HC RX REV CODE- 250/636: Performed by: INTERNAL MEDICINE

## 2019-09-17 PROCEDURE — 36415 COLL VENOUS BLD VENIPUNCTURE: CPT

## 2019-09-17 PROCEDURE — 74011250637 HC RX REV CODE- 250/637: Performed by: INTERNAL MEDICINE

## 2019-09-17 PROCEDURE — 90471 IMMUNIZATION ADMIN: CPT

## 2019-09-17 PROCEDURE — 80048 BASIC METABOLIC PNL TOTAL CA: CPT

## 2019-09-17 PROCEDURE — 80061 LIPID PANEL: CPT

## 2019-09-17 PROCEDURE — 85025 COMPLETE CBC W/AUTO DIFF WBC: CPT

## 2019-09-17 RX ORDER — GUAIFENESIN 100 MG/5ML
81 LIQUID (ML) ORAL DAILY
Status: SHIPPED | COMMUNITY
Start: 2019-09-17 | End: 2019-10-04

## 2019-09-17 RX ORDER — METOPROLOL SUCCINATE 25 MG/1
25 TABLET, EXTENDED RELEASE ORAL DAILY
Qty: 30 TAB | Refills: 11 | Status: SHIPPED | OUTPATIENT
Start: 2019-09-17

## 2019-09-17 RX ORDER — LISINOPRIL 2.5 MG/1
2.5 TABLET ORAL DAILY
Qty: 30 TAB | Refills: 11 | Status: SHIPPED | OUTPATIENT
Start: 2019-09-17 | End: 2021-09-23 | Stop reason: ALTCHOICE

## 2019-09-17 RX ADMIN — INFLUENZA VIRUS VACCINE 0.5 ML: 15; 15; 15; 15 SUSPENSION INTRAMUSCULAR at 08:53

## 2019-09-17 RX ADMIN — Medication 10 ML: at 05:39

## 2019-09-17 RX ADMIN — CLOPIDOGREL BISULFATE 75 MG: 75 TABLET ORAL at 09:00

## 2019-09-17 RX ADMIN — RANITIDINE 150 MG: 150 TABLET, FILM COATED ORAL at 09:01

## 2019-09-17 RX ADMIN — ASPIRIN 81 MG 81 MG: 81 TABLET ORAL at 09:01

## 2019-09-17 NOTE — PROGRESS NOTES
Care Management Interventions  PCP Verified by CM: Yes  Last Visit to PCP: 08/21/19  Palliative Care Criteria Met (RRAT>21 & CHF Dx)?: No(RRAT 7 Dx CAD)  Mode of Transport at Discharge: Other (see comment)(wife)  Transition of Care Consult (CM Consult): Discharge Planning  Discharge Durable Medical Equipment: No  Physical Therapy Consult: No  Occupational Therapy Consult: No  Speech Therapy Consult: No  Current Support Network: Lives with Spouse  Confirm Follow Up Transport: Self  Plan discussed with Pt/Family/Caregiver: Yes  Freedom of Choice Offered: Yes  Discharge Location  Discharge Placement: Home  Patient ready for d/c today. Patient voices no concerns or needs for d/c. Patient to d/c home with wife.

## 2019-09-17 NOTE — PROGRESS NOTES
Cardiac Rehab: Spoke with patient regarding referral to cardiac rehab. Patient meets admission criteria based on PCI (09/16/19). Written information about Cardiac Rehab given and reviewed with patient. Discussed lifestyle modifications to promote cardiac wellness. Patient indicated that he wants to participate in the cardiac rehab program and his orientation has been scheduled. His Cardiologist is Dr. Dom Alcantar.       Thank you,  VINH WallaceN, RN  Cardiopulmonary Rehabilitation Nurse Liaison  [facility]

## 2019-09-17 NOTE — PROGRESS NOTES
Bedside and Verbal shift change report given to [place] (oncoming nurse) by self (offgoing nurse). Report included the following information SBAR, Kardex, MAR and Recent Results.      Right groin site CDI

## 2019-09-17 NOTE — DISCHARGE SUMMARY
[facility] Discharge Summary     Patient ID:  Ida Robles  [id number]  32 y.o.  1945    Admit date: 9/16/2019    Discharge date:  09/17/2019    Admitting Physician: Severo Bounds, MD     Discharge Physician: Salomon Hagen NP/Dr. Domenica Guerra    Admission Diagnoses: CAD (coronary artery disease) [I25.10]  HSU (dyspnea on exertion) [R06.09]  CAD (coronary artery disease) [I25.10]    Discharge Diagnoses:    Diagnosis    CAD (coronary artery disease)    Abnormal nuclear stress test    Calcification of abdominal aorta (HCC)    HSU (dyspnea on exertion)    S/P CABG (coronary artery bypass graft)    S/P PTCA (percutaneous transluminal coronary angioplasty)    Paroxysmal atrial fibrillation (HCC)    GERD (gastroesophageal reflux disease)    HTN (hypertension)    Obesity, unspecified    Dyslipidemia    Tobacco use disorder       Cardiology Procedures this admission:  Left heart catheterization with PCI  Consults: None    Hospital Course: Patient was seen at the office of [facility] by Dr. Iza White. Sujey Goldstein for complaints of chest pain and HSU. He underwent NST which showed inferior lateral infarct and ischemia and was subsequently scheduled for an AM Admission [facility] on 09/16/19. Patient underwent cardiac catheterization by Dr. Viral Beckford. Patient was found to have 95% stenosis of the SVG to RCA that was stented with a Neil MARJORIE 4.5x15 and 4.5x30 with 0% residual stenosis. Patient was also found to have 2/3 patent graft. Patient tolerated the procedure well and was taken to the telemetry floor for recovery. The morning of discharge, patient was up feeling well without any complaints of chest pain or shortness of breath. Patient's right femoral cath site was clean, dry and intact without hematoma or bruit (closed with Mynx). Patient's labs were WNL. Patient was seen and examined by Dr. Domenica Guerra and determined stable and ready for discharge.  Patient was instructed on the importance of medication compliance including taking aspirin and Plavix/Effient/Brilinta everyday without missing a dose. After receiving drug eluting stents, the patient will remain on dual anti-platelet therapy for 1 year. For maximized medical therapy for CAD, patient will continue BB, ACE-I, and statin as well. The patient will follow up with [facility] -- Dr. Dylan Bangura. Chuy Curtis in 2-4 weeks. Patient has been referred to cardiac rehab. DISPOSITION: The patient is being discharged home in stable condition on a low saturated fat, low cholesterol and low salt diet. The patient is instructed to advance activities as tolerated to the limit of fatigue or shortness of breath. The patient is instructed to avoid all heavy lifting, straining, stooping or squatting for 3-5 days. The patient is instructed to watch the cath site for bleeding/oozing; if seen, the patient is instructed to apply firm pressure with a clean cloth and call [facility] at [phone]. The patient is instructed to watch for signs of infection which include: increasing area of redness, fever/hot to touch or purulent drainage at the catheterization site. The patient is instructed not to soak in a bathtub for 7-10 days, but is cleared to shower. The patient is instructed to call the office or return to the ER for immediate evaluation for any shortness of breath or chest pain not relieved by NTG.         Discharge Exam:   Visit Vitals  /76   Pulse 63   Temp 97.2 °F (36.2 °C)   Resp 18   Ht 5' 10\" (1.778 m)   Wt 101.6 kg (223 lb 14.4 oz)   SpO2 95%   BMI 32.13 kg/m²       Recent Results (from the past 24 hour(s))   EKG, 12 LEAD, INITIAL    Collection Time: 09/16/19  7:32 AM   Result Value Ref Range    Ventricular Rate 63 BPM    Atrial Rate 63 BPM    P-R Interval 158 ms    QRS Duration 114 ms    Q-T Interval 428 ms    QTC Calculation (Bezet) 437 ms    Calculated P Axis 58 degrees    Calculated R Axis -55 degrees    Calculated T Axis 54 degrees    Diagnosis       Normal sinus rhythm  Left anterior fascicular block  Nonspecific ST abnormality  When compared with ECG of 15-APR-2011 08:14,  T wave amplitude has decreased in Inferior leads  Nonspecific T wave abnormality has replaced inverted T waves in Lateral leads  Confirmed by Hortensia Muñoz MD (), LEO RUIZ ([id number]) on 9/16/2019 2:00:34 PM     CBC W/O DIFF    Collection Time: 09/16/19  7:42 AM   Result Value Ref Range    WBC 6.8 4.3 - 11.1 K/uL    RBC 4.87 4.23 - 5.6 M/uL    HGB 14.2 13.6 - 17.2 g/dL    HCT 42.3 41.1 - 50.3 %    MCV 86.9 79.6 - 97.8 FL    MCH 29.2 26.1 - 32.9 PG    MCHC 33.6 31.4 - 35.0 g/dL    RDW 13.1 11.9 - 14.6 %    PLATELET 766 951 - 648 K/uL    MPV 9.3 (L) 9.4 - 12.3 FL    ABSOLUTE NRBC 0.00 0.0 - 0.2 K/uL   METABOLIC PANEL, COMPREHENSIVE    Collection Time: 09/16/19  7:42 AM   Result Value Ref Range    Sodium 143 136 - 145 mmol/L    Potassium 3.9 3.5 - 5.1 mmol/L    Chloride 108 (H) 98 - 107 mmol/L    CO2 30 21 - 32 mmol/L    Anion gap 5 (L) 7 - 16 mmol/L    Glucose 98 65 - 100 mg/dL    BUN 19 8 - 23 MG/DL    Creatinine 1.15 0.8 - 1.5 MG/DL    GFR est AA >60 >60 ml/min/1.73m2    GFR est non-AA >60 >60 ml/min/1.73m2    Calcium 9.2 8.3 - 10.4 MG/DL    Bilirubin, total 0.5 0.2 - 1.1 MG/DL    ALT (SGPT) 21 12 - 65 U/L    AST (SGOT) 18 15 - 37 U/L    Alk.  phosphatase 45 (L) 50 - 136 U/L    Protein, total 6.8 6.3 - 8.2 g/dL    Albumin 3.8 3.2 - 4.6 g/dL    Globulin 3.0 2.3 - 3.5 g/dL    A-G Ratio 1.3 1.2 - 3.5     PROTHROMBIN TIME + INR    Collection Time: 09/16/19  7:42 AM   Result Value Ref Range    Prothrombin time 12.6 11.7 - 14.5 sec    INR 0.9     EKG, 12 LEAD, INITIAL    Collection Time: 09/16/19 12:01 PM   Result Value Ref Range    Ventricular Rate 60 BPM    Atrial Rate 60 BPM    P-R Interval 170 ms    QRS Duration 102 ms    Q-T Interval 438 ms    QTC Calculation (Bezet) 438 ms    Calculated P Axis 50 degrees    Calculated R Axis -60 degrees    Calculated T Axis -37 degrees    Diagnosis       Normal sinus rhythm  Left axis deviation  Nonspecific ST and T wave abnormality  Abnormal ECG  When compared with ECG of 16-SEP-2019 07:32,  ST now depressed in Inferior leads  T wave inversion now evident in Inferior leads  Confirmed by [facility]  MD ()LEO ([id number]) on 9/16/2019 7:44:57 PM     METABOLIC PANEL, BASIC    Collection Time: 09/17/19  3:53 AM   Result Value Ref Range    Sodium 142 136 - 145 mmol/L    Potassium 3.7 3.5 - 5.1 mmol/L    Chloride 107 98 - 107 mmol/L    CO2 30 21 - 32 mmol/L    Anion gap 5 (L) 7 - 16 mmol/L    Glucose 96 65 - 100 mg/dL    BUN 15 8 - 23 MG/DL    Creatinine 1.04 0.8 - 1.5 MG/DL    GFR est AA >60 >60 ml/min/1.73m2    GFR est non-AA >60 >60 ml/min/1.73m2    Calcium 8.7 8.3 - 10.4 MG/DL   LIPID PANEL    Collection Time: 09/17/19  3:53 AM   Result Value Ref Range    LIPID PROFILE          Cholesterol, total 238 (H) <200 MG/DL    Triglyceride 220 (H) 35 - 150 MG/DL    HDL Cholesterol 48 40 - 60 MG/DL    LDL, calculated 146 (H) <100 MG/DL    VLDL, calculated 44 (H) 6.0 - 23.0 MG/DL    CHOL/HDL Ratio 5.0     CBC WITH AUTOMATED DIFF    Collection Time: 09/17/19  3:53 AM   Result Value Ref Range    WBC 8.1 4.3 - 11.1 K/uL    RBC 4.77 4.23 - 5.6 M/uL    HGB 13.8 13.6 - 17.2 g/dL    HCT 41.7 41.1 - 50.3 %    MCV 87.4 79.6 - 97.8 FL    MCH 28.9 26.1 - 32.9 PG    MCHC 33.1 31.4 - 35.0 g/dL    RDW 13.0 11.9 - 14.6 %    PLATELET 397 783 - 224 K/uL    MPV 8.9 (L) 9.4 - 12.3 FL    ABSOLUTE NRBC 0.00 0.0 - 0.2 K/uL    DF AUTOMATED      NEUTROPHILS 61 43 - 78 %    LYMPHOCYTES 24 13 - 44 %    MONOCYTES 11 4.0 - 12.0 %    EOSINOPHILS 3 0.5 - 7.8 %    BASOPHILS 0 0.0 - 2.0 %    IMMATURE GRANULOCYTES 0 0.0 - 5.0 %    ABS. NEUTROPHILS 5.0 1.7 - 8.2 K/UL    ABS. LYMPHOCYTES 1.9 0.5 - 4.6 K/UL    ABS. MONOCYTES 0.9 0.1 - 1.3 K/UL    ABS. EOSINOPHILS 0.3 0.0 - 0.8 K/UL    ABS. BASOPHILS 0.0 0.0 - 0.2 K/UL    ABS. IMM.  GRANS. 0.0 0.0 - 0.5 K/UL         Patient Instructions:     Current Discharge Medication List      START taking these medications    Details   aspirin 81 mg chewable tablet Take 1 Tab by mouth daily. metoprolol succinate (TOPROL-XL) 25 mg XL tablet Take 1 Tab by mouth daily. Qty: 30 Tab, Refills: 11      lisinopril (PRINIVIL, ZESTRIL) 2.5 mg tablet Take 1 Tab by mouth daily. Qty: 30 Tab, Refills: 11         CONTINUE these medications which have NOT CHANGED    Details   FISH OIL-DHA-EPA PO Take  by mouth. HAWTHORN HERNANDEZ PO Take  by mouth. !! OTHER Nattokinase (replaced asa)      !! OTHER Protandim (antioxidant)      cyanocobalamin (VITAMIN B-12) 100 mcg tablet Take 400 mcg by mouth daily. !! OTHER C liposomal (antioxidant)      vitamin D3-vitamin K2, MK4, (K2 PLUS D3) 1,000-100 unit-mcg tab Take  by mouth. PARoxetine (PAXIL) 20 mg tablet Take 1 Tab by mouth every evening. Qty: 30 Tab, Refills: 5      montelukast (SINGULAIR) 10 mg tablet Take 1 Tab by mouth daily. Qty: 90 Tab, Refills: 3      raNITIdine (ZANTAC) 150 mg tablet Take 150 mg by mouth daily. thiamine HCL (VITAMIN B-1) 100 mg tablet Take 400 mg by mouth daily. arginine oxoglurate (L-ARGININE,ALPHA-KETOGLUTARAT, PO) Take  by mouth. !! OTHER Serrapeptase 20,000 daily      !! OTHER L_Carnitine and Alpha Lipoic Acid daily      clopidogrel (PLAVIX) 75 mg tab Take 1 Tab by mouth daily. Qty: 90 Tab, Refills: 3    Comments: Will give new Rx at next appointment      !! OTHER Antioxidant Extreme bid      vitamin E (AQUA GEMS) 400 unit capsule Take  by mouth daily. magnesium 250 mg tab Take  by mouth. ascorbic acid (VITAMIN C) 500 mg tablet Take 1,000 mg by mouth.      b complex vitamins (B COMPLEX 1) tablet Take 1 Tab by mouth daily. multivitamin (ONE A DAY) tablet Take 1 Tab by mouth daily. B.infantis-B.ani-B.long-B.bifi (PROBIOTIC 4X) 10-15 mg TbEC Take  by mouth.       CHOLECALCIFEROL, VITAMIN D3, (VITAMIN D3 PO) Take  by mouth.      coenzyme q10 (CO Q-10) 10 mg cap Take  by mouth. GARLIC PO Take  by mouth. evolocumab (REPATHA SURECLICK) pen injection 1 mL by SubCUTAneous route every fourteen (14) days. Qty: 2 Pen, Refills: 5      zaleplon (SONATA) 5 mg capsule Take 1 Cap by mouth nightly. Max Daily Amount: 5 mg. Qty: 30 Cap, Refills: 5    Associated Diagnoses: Other insomnia      LORazepam (ATIVAN) 1 mg tablet Take 1 Tab by mouth two (2) times daily as needed for Anxiety. Max Daily Amount: 2 mg. Qty: 30 Tab, Refills: 0    Associated Diagnoses: Other insomnia; Anxiety      miconazole (MICOTIN) 2 % topical cream Apply  to affected area two (2) times a day. Qty: 15 g, Refills: 0    Associated Diagnoses: Balanitis      !! OTHER Heart Miracle 2 oz. daily      !! OTHER Adrenal Cortisol daily      nitroglycerin (NITROSTAT) 0.4 mg SL tablet 1 Tab by SubLINGual route every five (5) minutes as needed for Chest Pain. Qty: 25 Tab, Refills: 11      furosemide (LASIX) 40 mg tablet Take 1 Tab by mouth daily as needed (swelling). Qty: 90 Tab, Refills: 3      potassium chloride (KLOR-CON M20) 20 mEq tablet Take 1 Tab by mouth daily as needed (with Lasix). Qty: 90 Tab, Refills: 3      APPLE CIDER VINEGAR PO Take  by mouth. !! OTHER Umcka Cold and Flu Elderberry as needed       !! - Potential duplicate medications found. Please discuss with provider.       STOP taking these medications       amoxicillin (AMOXIL) 875 mg tablet Comments:   Reason for Stopping:               Signed:  Juana Winston NP  9/17/2019  7:24 AM

## 2019-09-17 NOTE — DISCHARGE INSTRUCTIONS
DISCHARGE SUMMARY from Nurse    The patient is being discharged home in stable condition on a low saturated fat, low cholesterol and low salt diet. The patient is instructed to advance activities as tolerated to the limit of fatigue or shortness of breath. The patient is instructed to avoid all heavy lifting, straining, stooping or squatting for 3-5 days. The patient is instructed to watch the cath site for bleeding/oozing; if seen, the patient is instructed to apply firm pressure with a clean cloth and call [facility] at [phone]. The patient is instructed to watch for signs of infection which include: increasing area of redness, fever/hot to touch or purulent drainage at the catheterization site. The patient is instructed not to soak in a bathtub for 7-10 days, but is cleared to shower. The patient is instructed to call the office or return to the ER for immediate evaluation for any shortness of breath or chest pain not relieved by NTG.      PATIENT INSTRUCTIONS:    After general anesthesia or intravenous sedation, for 24 hours or while taking prescription Narcotics:  · Limit your activities  · Do not drive and operate hazardous machinery  · Do not make important personal or business decisions  · Do  not drink alcoholic beverages  · If you have not urinated within 8 hours after discharge, please contact your surgeon on call. Report the following to your surgeon:  · Excessive pain, swelling, redness or odor of or around the surgical area  · Temperature over 100.5  · Nausea and vomiting lasting longer than 4 hours or if unable to take medications  · Any signs of decreased circulation or nerve impairment to extremity: change in color, persistent  numbness, tingling, coldness or increase pain  · Any questions    What to do at Home:      *  Please give a list of your current medications to your Primary Care Provider.     *  Please update this list whenever your medications are discontinued, doses are changed, or new medications (including over-the-counter products) are added. *  Please carry medication information at all times in case of emergency situations. These are general instructions for a healthy lifestyle:    No smoking/ No tobacco products/ Avoid exposure to second hand smoke  Surgeon General's Warning:  Quitting smoking now greatly reduces serious risk to your health. Obesity, smoking, and sedentary lifestyle greatly increases your risk for illness    A healthy diet, regular physical exercise & weight monitoring are important for maintaining a healthy lifestyle    You may be retaining fluid if you have a history of heart failure or if you experience any of the following symptoms:  Weight gain of 3 pounds or more overnight or 5 pounds in a week, increased swelling in our hands or feet or shortness of breath while lying flat in bed. Please call your doctor as soon as you notice any of these symptoms; do not wait until your next office visit. The discharge information has been reviewed with the patient. The patient verbalized understanding. Discharge medications reviewed with the patient and appropriate educational materials and side effects teaching were provided. ___________________________________________________________________________________________________________________________________     Reducing Heart Attack Risk With Daily Medicine: Care Instructions  Your Care Instructions    If you are at risk for a heart attack, there are many medicines that can reduce your risk. These include:  · ACE inhibitors or ARBs. These are types of blood pressure medicines. They can reduce the risk of heart attacks and strokes if you are at high risk. · Statins and other cholesterol medicines. These lower cholesterol. They can also reduce the risk of a stroke. · Aspirin and other antiplatelets. These prevent blood clots.  They can help certain people lower their risk of a heart attack or stroke. · Beta-blocker medicines. These are a type of blood pressure and heart medicine. They can reduce the chance of early death if you have had a heart attack. All medicines can cause side effects. So it is important to understand the pros and cons of any medicine you take. It is also important to take your medicines exactly as your doctor tells you to. Follow-up care is a key part of your treatment and safety. Be sure to make and go to all appointments, and call your doctor if you are having problems. It's also a good idea to know your test results and keep a list of the medicines you take. ACE inhibitors  ACE (angiotensin-converting enzyme) inhibitors are used for three main reasons. They lower blood pressure, protect the kidneys, and prevent heart attacks and strokes. Examples include benazepril (Lotensin), lisinopril (Prinivil, Zestril), and ramipril (Altace). An angiotensin II receptor blocker (ARB) may be used instead of an ACE inhibitor. ARBs help you in the same ways as ACE inhibitors. Examples include candesartan (Atacand), irbesartan (Avapro), and losartan (Cozaar). Before you start taking an ACE inhibitor or an ARB, make sure your doctor knows if:  · You are taking a water pill (diuretic). · You are taking potassium pills or using salt substitutes. · You are pregnant or breastfeeding. · You have had a kidney transplant or other kidney problems. ACE inhibitors and ARBs can cause side effects. Call your doctor right away if you have:  · Trouble breathing. · Swelling in your face, head, neck, or tongue. · Dizziness or lightheadedness. · A dry cough. Statins  Statins lower cholesterol. Examples include atorvastatin (Lipitor), lovastatin (Mevacor), pravastatin (Pravachol), and simvastatin (Zocor). Before you start taking a statin, make sure your doctor knows if:  · You have had a kidney transplant or other kidney problems. · You have liver disease.   · You take any other prescription medicine, over-the-counter medicine, vitamins, supplements, or herbal remedies. · You are pregnant or breastfeeding. Statins can cause side effects. Call your doctor right away if you have:  · New, severe muscle aches. · Brown urine. Aspirin  Taking an aspirin every day can lower your risk for a heart attack. A heart attack occurs when a blood vessel in the heart gets blocked. When this happens, oxygen can't get to the heart muscle, and part of the heart dies. Aspirin can help prevent blood clots that can block the blood vessels. Talk to your doctor before you start taking aspirin every day. He or she may recommend that you take one low-dose aspirin (81 mg) tablet each day, with a meal and a full glass of water. Taking aspirin isn't right for everyone. This is because it can cause serious bleeding. And you may not be able to use aspirin if you:  · Have asthma. · Have an ulcer or other stomach problem. · Take some other medicine (called a blood thinner) that prevents blood clots. · Are allergic to aspirin. Before having a surgery or procedure, tell your doctor or dentist that you take aspirin. He or she will tell you if you should stop taking aspirin beforehand. Make sure that you understand exactly what your doctor wants you to do. Aspirin can cause side effects. Call your doctor right away if you have:  · Unusual bleeding or bruising. · Nausea, vomiting, or heartburn. · Black or bloody stools. Beta-blockers  Beta-blockers are used for three main reasons. They lower blood pressure, relieve angina symptoms (such as chest pain or pressure), and reduce the chances of a second heart attack. They include atenolol (Tenormin), carvedilol (Coreg), and metoprolol (Lopressor). Before you start taking a beta-blocker, make sure your doctor knows if you have:  · Severe asthma or frequent asthma attacks. · A very slow pulse (less than 55 beats a minute). Beta-blockers can cause side effects.  Call your doctor right away if you have:  · Wheezing or trouble breathing. · Dizziness or lightheadedness. · Asthma that gets worse. When should you call for help? Watch closely for changes in your health, and be sure to contact your doctor if you have any problems. Where can you learn more? Go to http://fer-ekn.info/. Enter R428 in the search box to learn more about \"Reducing Heart Attack Risk With Daily Medicine: Care Instructions. \"  Current as of: July 22, 2018  Content Version: 12.1  © 3300-1362 [employer]. Care instructions adapted under license by [employer] (which disclaims liability or warranty for this information). If you have questions about a medical condition or this instruction, always ask your healthcare professional. [address] any warranty or liability for your use of this information. Left Heart Catheterization: About This Test  What is it? Cardiac catheterization is a test to check the left side of your heart. Your doctor might look at the shape of your heart, the motion of your heart, or the blood pressure inside the chambers. Why is this test done? This test gives information about how your heart is working. It can:  · Check blood flow and blood pressure in the chambers of the heart. · Check the pumping action of the heart. · Find out if a heart defect is present and how severe it is. · Find out how well the heart valves work. What happens during the test?  · You will get medicine to help you relax. · A thin tube called a catheter is put into a blood vessel in the groin or the arm. The doctor moves the catheter through the blood vessel into your heart. · You will get a shot to numb the skin where the catheter goes in. You may feel pressure when the doctor moves the catheter through your blood vessel into your heart. · Dye may be injected into your heart.  Your doctor can watch on special monitors as the dye moves in your heart. The dye helps your doctor see blood flow in your heart. · You may feel hot or flushed for several seconds when the dye is put in.  · If a heart defect is found, cardiac catheterization sometimes is used to correct it during the test.  How long does it take? · The test will take about 30 minutes. If a problem is found and the doctor treats it, it can take a few hours longer. What happens after the test?  · You will stay in a room for at least a few hours to make sure the catheter site starts to heal. You may have a bandage or a compression device on your groin or arm to prevent bleeding. · If the catheter was placed in your groin, you may lie in bed for a few hours. If the catheter was put in your arm, you will need to keep your arm still for at least 1 hour. · You may or may not need to stay in the hospital overnight. You will get more instructions for what to do at home. · Drink plenty of fluids for several hours after the test.  Follow-up care is a key part of your treatment and safety. Be sure to make and go to all appointments, and call your doctor if you are having problems. It's also a good idea to know your test results and keep a list of the medicines you take. Where can you learn more? Go to http://fer-ken.info/. Enter W306 in the search box to learn more about \"Left Heart Catheterization: About This Test.\"  Current as of: July 22, 2018  Content Version: 12.1  © 0305-0066 [employer]. Care instructions adapted under license by [employer] (which disclaims liability or warranty for this information). If you have questions about a medical condition or this instruction, always ask your healthcare professional. [place] [address] any warranty or liability for your use of this information.                        Cardiac Catheterization/Angiography Discharge Instructions    *Check the puncture site frequently for swelling or bleeding. If you see any bleeding, lie down and apply pressure over the area with a clean town or washcloth. Notify your doctor for any redness, swelling, drainage or oozing from the puncture site. Notify your doctor for any fever or chills. *If the leg or arm with the puncture becomes cold, numb or painful, call [facility]    *Activity should be limited for the next 48 hours. Climb stairs as little as possible and avoid any stooping, bending or strenuous activity for 48 hours. No heavy lifting (anything over 10 pounds) for three days. *Do not drive for 48 hours. *You may resume your usual diet. Drink more fluids than usual.    *Have a responsible person drive you home and stay with you for at least 24 hours after your heart catheterization/angiography. *You may remove the bandage from your Right and Groin in 24 hours. You may shower in 24 hours. No tub baths, hot tubs or swimming for one week. Do not place any lotions, creams, powders, ointments over the puncture site for one week. You may place a clean band-aid over the puncture site each day for 5 days. Change this daily. Learning About Coronary Artery Disease (CAD)  What is coronary artery disease? Coronary artery disease (CAD) occurs when plaque builds up in the arteries that bring oxygen-rich blood to your heart. Plaque is a fatty substance made of cholesterol, calcium, and other substances in the blood. This process is called hardening of the arteries, or atherosclerosis. What happens when you have coronary artery disease? · Plaque may narrow the coronary arteries. Narrowed arteries cause poor blood flow. This can lead to angina symptoms such as chest pain or discomfort. If blood flow is completely blocked, you could have a heart attack. · You can slow CAD and reduce the risk of future problems by making changes in your lifestyle. These include quitting smoking and eating heart-healthy foods.   · Treatments for CAD, along with changes in your lifestyle, can help you live a longer and healthier life. How can you prevent coronary artery disease? · Do not smoke. It may be the best thing you can do to prevent heart disease. If you need help quitting, talk to your doctor about stop-smoking programs and medicines. These can increase your chances of quitting for good. · Be active. Get at least 30 minutes of exercise on most days of the week. Walking is a good choice. You also may want to do other activities, such as running, swimming, cycling, or playing tennis or team sports. · Eat heart-healthy foods. Eat more fruits and vegetables and less foods that contain saturated and trans fats. Limit alcohol, sodium, and sweets. · Stay at a healthy weight. Lose weight if you need to. · Manage other health problems such as diabetes, high blood pressure, and high cholesterol. · If you have talked about it with your doctor, take a low-dose aspirin every day. Aspirin can help certain people lower their risk of a heart attack or stroke. But taking aspirin isn't right for everyone, because it can cause serious bleeding. Do not start taking daily aspirin unless your doctor knows about it. How is coronary artery disease treated? · Your doctor will suggest that you make lifestyle changes. For example, your doctor may ask you to eat healthy foods, quit smoking, lose extra weight, and be more active. · You will have to take medicines. · Your doctor may suggest a procedure to open narrowed or blocked arteries. This is called angioplasty. Or your doctor may suggest using healthy blood vessels to create detours around narrowed or blocked arteries. This is called bypass surgery. Follow-up care is a key part of your treatment and safety. Be sure to make and go to all appointments, and call your doctor if you are having problems. It's also a good idea to know your test results and keep a list of the medicines you take.   Where can you learn more?  Go to http://fer-ken.info/. Enter [phone] in the search box to learn more about \"Learning About Coronary Artery Disease (CAD). \"  Current as of: July 22, 2018  Content Version: 12.1  © 5083-0355 [employer]. Care instructions adapted under license by [employer] (which disclaims liability or warranty for this information). If you have questions about a medical condition or this instruction, always ask your healthcare professional. [place] [address] any warranty or liability for your use of this information. Heart Attack: Care Instructions  Your Care Instructions    A heart attack (myocardial infarction, or MI) occurs when one or more of the coronary arteries, which supply the heart with oxygen-rich blood, is blocked. A blockage usually occurs when plaque inside the artery breaks open and a blood clot forms in the artery. After a heart attack, you may be worried about your future. Over the next several weeks, your heart will start to heal. Though it can be hard to break old habits, you can prevent another heart attack by making some lifestyle changes and by taking medicines. You may use this information for ideas about what to do at home to speed your recovery. Follow-up care is a key part of your treatment and safety. Be sure to make and go to all appointments, and call your doctor if you are having problems. It's also a good idea to know your test results and keep a list of the medicines you take. How can you care for yourself at home? Activity    · Until your doctor says it is okay, do not do strenuous exercise. And do not lift, pull, or push anything heavy. Ask your doctor what types of activities are safe for you. · If your doctor has not set you up with a cardiac rehabilitation (rehab) program, talk to him or her about whether that is right for you. Cardiac rehab includes supervised exercise.  It also includes help with diet and lifestyle changes and emotional support. It may reduce your risk of future heart problems. · Increase your activities slowly. Take short rest breaks when you get tired. · If your doctor recommends it, get more exercise. Walking is a good choice. Bit by bit, increase the amount you walk every day. Try for at least 30 minutes on most days of the week. You also may want to swim, bike, or do other activities. Talk with your doctor before you start an exercise program to make sure it is safe for you. · Ask your doctor when you can drive, go back to work, and do other daily activities again. · You can have sex as soon as you feel ready for it. Often this means when you can easily walk around or climb stairs. Talk with your doctor if you have any concerns. If you are taking nitroglycerin, do not take erection-enhancing medicine such as sildenafil (Viagra), tadalafil (Cialis), or vardenafil (Levitra) . Lifestyle changes    · Do not smoke. Smoking increases your risk of another heart attack. If you need help quitting, talk to your doctor about stop-smoking programs and medicines. These can increase your chances of quitting for good. · Eat a heart-healthy diet that is low in saturated fat and salt, and is full of fruits, vegetables and whole-grains. Eat at least two servings of fish each week. You may get more details about how to eat healthy. But these tips can help you get started. · Stay at a healthy weight, or lose weight if you need to. Medicines    · Be safe with medicines. Take your medicines exactly as prescribed. Call your doctor if you think you are having a problem with your medicine. You will get more details on the specific medicines your doctor prescribes. Do not stop taking your medicine unless your doctor tells you to. Not taking your medicine might raise your risk of having another heart attack. · You may need several medicines to help lower your risk of another heart attack. These include:  ? Blood pressure medicines such as angiotensin-converting enzyme (ACE) inhibitors, ARBs (angiotensin II receptor blockers), and beta-blockers. ? Cholesterol medicine called statins. ? Aspirin and other blood thinners. These prevent blood clots that can cause a heart attack. · If your doctor has given you nitroglycerin, keep it with you at all times. If you have angina symptoms, such as chest pain or pressure, sit down and rest. Take the first dose of nitroglycerin as directed. If symptoms get worse or are not getting better within 5 minutes, call 911 right away. Stay on the phone. The emergency  will tell you what to do. · Do not take any over-the-counter medicines, vitamins, or herbal products without talking to your doctor first.    Staying healthy    · Manage other health conditions such as high blood pressure and diabetes. · Avoid colds and flu. Get a pneumococcal vaccine shot. If you have had one before, ask your doctor whether you need another dose. Get the flu vaccine every year. If you must be around people with colds or flu, wash your hands often. · Be sure to tell your doctor about any angina symptoms you have had, even if they went away. Pay attention to your angina symptoms. Know what is typical for you and learn how to control it. Know when to call for help. · Talk to your family, friends, or a counselor about your feelings. It is normal to feel frightened, angry, hopeless, helpless, and even guilty. Talking openly about bad feelings can help you cope. If you have symptoms of depression, talk to your doctor. When should you call for help? Call 911 anytime you think you may need emergency care. For example, call if:    · You have symptoms of a heart attack. These may include:  ? Chest pain or pressure, or a strange feeling in the chest.  ? Sweating. ? Shortness of breath. ? Nausea or vomiting.   ? Pain, pressure, or a strange feeling in the back, neck, jaw, or upper belly or in one or both shoulders or arms. ? Lightheadedness or sudden weakness. ? A fast or irregular heartbeat. After you call 911, the  may tell you to chew 1 adult-strength or 2 to 4 low-dose aspirin. Wait for an ambulance. Do not try to drive yourself. · You have angina symptoms (such as chest pain or pressure) that do not go away with rest or are not getting better within 5 minutes after you take a dose of nitroglycerin. · You passed out (lost consciousness). · You feel like you are having another heart attack. Call your doctor now or seek immediate medical care if:    · You are having angina symptoms, such as chest pain or pressure, more often than usual, or the symptoms are different or worse than usual.     · You have new or increased shortness of breath. · You are dizzy or lightheaded, or you feel like you may faint. Watch closely for changes in your health, and be sure to contact your doctor if you have any problems. Where can you learn more? Go to http://fer-ken.info/. Enter [id number] in the search box to learn more about \"Heart Attack: Care Instructions. \"  Current as of: July 22, 2018  Content Version: 12.1  © 6099-7899 [employer]. Care instructions adapted under license by [employer] (which disclaims liability or warranty for this information). If you have questions about a medical condition or this instruction, always ask your healthcare professional. [place] [address] any warranty or liability for your use of this information. Percutaneous Coronary Intervention: What to Expect at [facility]    Percutaneous coronary intervention (PCI) is the name for procedures that are used to open a narrowed or blocked coronary artery. The two most common PCI procedures are coronary angioplasty and coronary stent placement.   Your groin or arm may have a bruise and feel sore for a day or two after a percutaneous coronary intervention (PCI). You can do light activities around the house, but nothing strenuous for several days. This care sheet gives you a general idea about how long it will take for you to recover. But each person recovers at a different pace. Follow the steps below to get better as quickly as possible. How can you care for yourself at home? Activity    · If the doctor gave you a sedative:  ? For 24 hours, don't do anything that requires attention to detail, such as going to work, making important decisions, or signing any legal documents. It takes time for the medicine's effects to completely wear off.  ? For your safety, do not drive or operate any machinery that could be dangerous. Wait until the medicine wears off and you can think clearly and react easily. · Do not do strenuous exercise and do not lift, pull, or push anything heavy until your doctor says it is okay. This may be for a day or two. You can walk around the house and do light activity, such as cooking. · If the catheter was placed in your groin, try not to walk up stairs for the first couple of days. · If the catheter was placed in your arm near your wrist, do not bend your wrist deeply for the first couple of days. Be careful using your hand to get into and out of a chair or bed. · Carry your stent identification card with you at all times. · If your doctor recommends it, get more exercise. Walking is a good choice. Bit by bit, increase the amount you walk every day. Try for at least 30 minutes on most days of the week. Diet    · Drink plenty of fluids to help your body flush out the dye. If you have kidney, heart, or liver disease and have to limit fluids, talk with your doctor before you increase the amount of fluids you drink. · Keep eating a heart-healthy diet that has lots of fruits, vegetables, and whole grains. If you have not been eating this way, talk to your doctor.  You also may want to talk to a dietitian. This expert can help you to learn about healthy foods and plan meals. Medicines    · Your doctor will tell you if and when you can restart your medicines. He or she will also give you instructions about taking any new medicines. · If you take blood thinners, such as warfarin (Coumadin), clopidogrel (Plavix), or aspirin, be sure to talk to your doctor. He or she will tell you if and when to start taking those medicines again. Make sure that you understand exactly what your doctor wants you to do. · Your doctor will prescribe blood-thinning medicines. You will likely take aspirin plus another antiplatelet, such as clopidogrel (Plavix). It is very important that you take these medicines exactly as directed. These medicines help keep the coronary artery open and reduce your risk of a heart attack. · Call your doctor if you think you are having a problem with your medicine. Care of the catheter site    · For 1 or 2 days, keep a bandage over the spot where the catheter was inserted. The bandage probably will fall off in this time. · Put ice or a cold pack on the area for 10 to 20 minutes at a time to help with soreness or swelling. Put a thin cloth between the ice and your skin. · You may shower 24 to 48 hours after the procedure, if your doctor okays it. Pat the incision dry. · Do not soak the catheter site until it is healed. Don't take a bath for 1 week, or until your doctor tells you it is okay. · Watch for bleeding from the site. A small amount of blood (up to the size of a quarter) on the bandage can be normal.     · If you are bleeding, lie down and press on the area for 15 minutes to try to make it stop. If the bleeding does not stop, call your doctor or seek immediate medical care. Follow-up care is a key part of your treatment and safety. Be sure to make and go to all appointments, and call your doctor if you are having problems.  It's also a good idea to know your test results and keep a list of the medicines you take. When should you call for help? Call 911 anytime you think you may need emergency care. For example, call if:    · You passed out (lost consciousness). · You have severe trouble breathing. · You have sudden chest pain and shortness of breath, or you cough up blood. · You have symptoms of a heart attack, such as:  ? Chest pain or pressure. ? Sweating. ? Shortness of breath. ? Nausea or vomiting. ? Pain that spreads from the chest to the neck, jaw, or one or both shoulders or arms. ? Dizziness or lightheadedness. ? A fast or uneven pulse. After calling 911, chew 1 adult-strength aspirin. Wait for an ambulance. Do not try to drive yourself. · You have been diagnosed with angina, and you have angina symptoms that do not go away with rest or are not getting better within 5 minutes after you take one dose of nitroglycerin. Call your doctor now or seek immediate medical care if:    · You are bleeding from the area where the catheter was put in your artery. · You have a fast-growing, painful lump at the catheter site. · You have signs of infection, such as:  ? Increased pain, swelling, warmth, or redness. ? Red streaks leading from the catheter site. ? Pus draining from the catheter site. ? A fever. · Your leg, arm, or hand is painful, looks blue, or feels cold, numb, or tingly. Watch closely for changes in your health, and be sure to contact your doctor if you have any problems. Where can you learn more? Go to http://fer-ken.info/. Enter Z172 in the search box to learn more about \"Percutaneous Coronary Intervention: What to Expect at Home. \"  Current as of: July 22, 2018  Content Version: 12.1  © 8808-3658 Healthwise, [employer]. Care instructions adapted under license by [facility] (which disclaims liability or warranty for this information).  If you have questions about a medical condition or this instruction, always ask your healthcare professional. [address] any warranty or liability for your use of this information. Lisinopril/Hydrochlorothiazide (Prinzide, Zestoretic) - (By mouth)   Why this medicine is used:   Treats high blood pressure. Contact a nurse or doctor right away if you have:  · Blistering, peeling, red skin rash  · Uneven heartbeat  · Dry mouth, increased thirst, muscle cramps, nausea or vomiting  · Change in how much or how often you urinate, problems urinating  · Lightheadedness, dizziness, or fainting     Common side effects:  · Trouble seeing, eye pain, blurred vision or other vision changes  · Headache  · Dry cough  © 2017 [facility] Information is for End User's use only and may not be sold, redistributed or otherwise used for commercial purposes. Metoprolol/Hydrochlorothiazide (Dutoprol, Lopressor HCT) - (By mouth)   Why this medicine is used:   Treats high blood pressure. Contact a nurse or doctor right away if you have:  · Blistering, peeling, red skin rash  · Uneven heartbeat  · Rapid weight gain; swelling in your hands, ankles, or feet  · Lightheadedness, dizziness, fainting  · Dry mouth, increased thirst, muscle cramps, nausea or vomiting     Common side effects:  · Depression  · Mild dizziness, headache, tiredness  · Mild diarrhea, constipation, nausea  © 2017 [employer] [facility] Information is for End User's use only and may not be sold, redistributed or otherwise used for commercial purposes. Aspirin (Yvonne Extra Strength, Yvonne Aspirin Children's, Bufferin, Bufferin Low Dose) - (By mouth)   Why this medicine is used:   Treats pain, fever, and inflammation. May also reduce the risk of heart attack.   Contact a nurse or doctor right away if you have:  · Bloody vomit or vomit that looks like coffee grounds  · Blood in urine or bloody or black, tarry stools  · Wheezing or trouble breathing     Common side effects:  · Upset stomach  © 2017 [facility] Information is for End User's use only and may not be sold, redistributed or otherwise used for commercial purposes.

## 2019-09-17 NOTE — PROCEDURES
[facility]  CARDIAC CATH    Name:  Bernard Hillman  MR#:  [id number]  :  1945  ACCOUNT #:  [de-identified]  DATE OF SERVICE:  2019    PROCEDURES PERFORMED:  Left heart catheterization via the right femoral artery with 6-Armenian JL-4, JR-4. IM catheter was used for the LIMA. Angled pigtail was used. Angioplasty and stenting of the vein graft to the RCA with intravascular ultrasound assistance with the [facility] was performed with a multipurpose guide, a Prowater wire, a 5.0 Spiderwire. Sheathogram confirmed common femoral artery sheath placement and Mynx closure device was placed with good hemostasis. PREOPERATIVE DIAGNOSIS:  Chest pain with inferior ischemia on stress test.    POSTOPERATIVE DIAGNOSIS:  Coronary artery disease. SURGEON:  Dalila Rodriguez MD    ASSISTANT:  None. ESTIMATED BLOOD LOSS:  10 mL. SPECIMENS REMOVED:  None. COMPLICATIONS:  None. IMPLANTS:  A 4.5 x 15 and a 4.5 x 30 Neil drug-eluting stent. ANESTHESIA:  Conscious sedation was given by Adilson Bolaños RN beginning at 10:30 and concluding at 11:29 with a total of 3 mg Versed, 50 mcg fentanyl. Vital signs and saturation were stable throughout. FINDINGS:  The left ventricle is normal size. There is low normal systolic function with an ejection fraction of 50%. Left ventricular end-diastolic pressure is 26 mmHg with an opening aortic pressure of 147/77. No gradient across the aortic valve. Left main coronary artery is in normal anatomic position, free of significant disease. Bifurcates into LAD and circumflex system. The LAD has competitive filling in the midsection. There is 95% midvessel narrowing. There is small proximal diagonal, free of significant disease. Circumflex has stent in the proximal segments, widely patent. Midportion of the vessel has 50% narrowing. This terminates into a small distal obtuse marginal branch.     The right coronary artery has a 50% proximal narrowing and then diffusely diseased distally up to 80%. There is competitive filling from the vein graft. The vein graft to the RCA has a 95% ostial narrowing. There is diffuse narrowing up to 80% in the proximal body of the vessel as well. The vein graft in circumflex occluded. LIMA to LAD is widely patent. Good proximal and distal anastomosis. The runoff native LAD is small, but free of any high-grade narrowing. After Angiomax was given a Prowater was placed in the distal vessel. A 5.0 Spiderwire was changed out. Intravascular ultrasound with Volcano system was performed for sizing and to  better interrogate the ostium of the vessel. At this point, a 4.5 x 30 Neil drug-eluting stent was deployed in overlapping fashion of 4.0 x 15 Neil drug-eluting stent was deployed at 14 atmospheres. There is a 0% residual and MARIELY-III flow. Intravascular ultrasound was repeated. Stents were well apposed and the procedure was concluded. CONCLUSIONS:  1. Successful angioplasty and stenting of the vein graft to the RCA. 2.  2/3 patent bypass grafts. 3.  Preserved LV systolic function. 4.  Severe native three-vessel coronary disease. 5.  Successful Mynx closure device.       MD COLLIN [email or web address]  D:  09/16/2019 18:02  T:  09/16/2019 18:05  JOB #:  [id number]

## 2019-09-17 NOTE — PROGRESS NOTES
Discharge instructions reviewed with pt. Prescriptions sent to pharm and med info sheets provided for all new medications. Opportunity for questions provided. pt voiced understanding of all discharge instructions.

## 2019-10-04 ENCOUNTER — HOSPITAL ENCOUNTER (OUTPATIENT)
Dept: CARDIAC REHAB | Age: 74
Discharge: HOME | End: 2019-10-04

## 2019-10-04 ENCOUNTER — HOSPITAL ENCOUNTER (OUTPATIENT)
Dept: CT IMAGING | Age: 74
Discharge: HOME | End: 2019-10-04
Attending: FAMILY MEDICINE
Payer: SELF-PAY

## 2019-10-04 VITALS — BODY MASS INDEX: 31.07 KG/M2 | WEIGHT: 217 LBS | HEIGHT: 70 IN

## 2019-10-04 DIAGNOSIS — I25.84 CORONARY ARTERY DISEASE DUE TO CALCIFIED CORONARY LESION: ICD-10-CM

## 2019-10-04 DIAGNOSIS — I25.10 CORONARY ARTERY DISEASE DUE TO CALCIFIED CORONARY LESION: ICD-10-CM

## 2019-10-04 DIAGNOSIS — Z09 HOSPITAL DISCHARGE FOLLOW-UP: ICD-10-CM

## 2019-10-04 DIAGNOSIS — E78.5 HYPERLIPIDEMIA, UNSPECIFIED HYPERLIPIDEMIA TYPE: ICD-10-CM

## 2019-10-04 DIAGNOSIS — I10 ESSENTIAL HYPERTENSION: ICD-10-CM

## 2019-10-04 PROCEDURE — 75571 CT HRT W/O DYE W/CA TEST: CPT

## 2019-10-04 RX ORDER — ZALEPLON 5 MG/1
5 CAPSULE ORAL
COMMUNITY
End: 2020-11-13 | Stop reason: SDUPTHER

## 2019-10-04 RX ORDER — ESZOPICLONE 3 MG/1
TABLET, FILM COATED ORAL
COMMUNITY
End: 2020-11-13

## 2019-10-04 NOTE — PROGRESS NOTES
Date 10/04/19    Re: Scores from Psychological Testing       Dear Dr. Zachary Mosquera,    Your patient, Mr. Jessica Leija ( 1945), has taken the Patient Health Questionnaire (PHQ-9) as part of their admission to the Cardiac Rehab program. The results of this test indicate that they may be experiencing major depression. His score of 16 was heavily influenced by his reports of trouble sleeping, feeling bad about himself and being restless and figety nearly every day. He reported little interest in doing things, feeling tired, and having trouble concentrating for more than half his days. He denies thoughts that he would be better off dead or plans for self harm. Thank you for your support and attention to this matter.       Suly Sprague, VINHN, RN  Cardiopulmonary Rehabilitation

## 2019-10-08 NOTE — PROGRESS NOTES
Per Dr. Som Garay: Indicates evidence of significant blockage development. Need to follow up with cardiologist for further evaluation.- Notified, verbalizes understanding.

## 2019-10-10 ENCOUNTER — HOSPITAL ENCOUNTER (OUTPATIENT)
Dept: CARDIAC REHAB | Age: 74
Discharge: HOME | End: 2019-10-10
Payer: MEDICARE

## 2019-10-10 VITALS — HEIGHT: 70 IN | BODY MASS INDEX: 31.92 KG/M2 | WEIGHT: 223 LBS

## 2019-10-10 PROCEDURE — 93798 PHYS/QHP OP CAR RHAB W/ECG: CPT

## 2019-10-11 ENCOUNTER — HOSPITAL ENCOUNTER (OUTPATIENT)
Dept: CARDIAC REHAB | Age: 74
Discharge: HOME | End: 2019-10-11
Payer: MEDICARE

## 2019-10-11 ENCOUNTER — HOSPITAL ENCOUNTER (OUTPATIENT)
Dept: CARDIAC REHAB | Age: 74
End: 2019-10-11
Payer: MEDICARE

## 2019-10-11 PROCEDURE — 93798 PHYS/QHP OP CAR RHAB W/ECG: CPT

## 2019-10-14 ENCOUNTER — APPOINTMENT (OUTPATIENT)
Dept: CARDIAC REHAB | Age: 74
End: 2019-10-14
Payer: MEDICARE

## 2019-10-14 ENCOUNTER — HOSPITAL ENCOUNTER (OUTPATIENT)
Dept: CARDIAC REHAB | Age: 74
Discharge: HOME | End: 2019-10-14
Payer: MEDICARE

## 2019-10-14 PROCEDURE — 93798 PHYS/QHP OP CAR RHAB W/ECG: CPT

## 2019-10-16 ENCOUNTER — APPOINTMENT (OUTPATIENT)
Dept: CARDIAC REHAB | Age: 74
End: 2019-10-16
Payer: MEDICARE

## 2019-10-16 ENCOUNTER — HOSPITAL ENCOUNTER (OUTPATIENT)
Dept: CARDIAC REHAB | Age: 74
Discharge: HOME | End: 2019-10-16
Payer: MEDICARE

## 2019-10-16 VITALS — WEIGHT: 219 LBS | BODY MASS INDEX: 31.42 KG/M2

## 2019-10-16 PROCEDURE — 93798 PHYS/QHP OP CAR RHAB W/ECG: CPT

## 2019-10-18 ENCOUNTER — APPOINTMENT (OUTPATIENT)
Dept: CARDIAC REHAB | Age: 74
End: 2019-10-18
Payer: MEDICARE

## 2019-10-18 ENCOUNTER — HOSPITAL ENCOUNTER (OUTPATIENT)
Dept: CARDIAC REHAB | Age: 74
Discharge: HOME | End: 2019-10-18
Payer: MEDICARE

## 2019-10-18 PROCEDURE — 93798 PHYS/QHP OP CAR RHAB W/ECG: CPT

## 2019-10-18 NOTE — PROGRESS NOTES
69year old male s/p PCI on 9/16/19  enrolled in cardiac rehabilitation, seen today for initial nutrition counseling. States good energy level and no concerns with appetite today. Stated Nutrition Goals: 30 pound weight loss, prevent further plaque to build up on his artery walls. Medical History: asthma, CAD, Gout, GERD, HLD, HTN, obesity, former smoker   Nutrition related medications/supplements: Pt on multiple naturopathic supplements. List is in his chart. He has been instructed by RN to review interactions with pharmacist.     Nutrition Related Labs:   (H),  (H),  (H), HDL 48 (L). Social History: Pt is lives with spouse. Works part-time for his own business doing administrative functions. Physical Activity: Rehabilitation 3 per week. Food and Nutrition Intake History:   Tells me he would like to lose weight and has success in the past doing the Ketogenic diet. Follow grains free, no dairy, rarely eats meatless and does not eat fish regularly. 0-2 servings of fruits and vegetables per day, as he is trying to follow ketogenic type diet. States he does not drink daily. GI Hx: /Digestive Issues: Allergy to [place] Nuts- GERD, constipation. States he is on a probiotic because he has an overgrowth of bacteria (species unknown to him). Anthropometric Data:  BMI:31.42 kg/m²  Height: 5' 10\" (1.778 m). Weight: 99.3 kg (219 lb). Waist measurement (inches): 44-places patient at risk for metabolic issues. Estimated Nutritional Needs:  Calories: MSJ x 1.2 (sedentary)  for weight maintenance: 2100 MSJ x  (-250kcal) for safe weight loss: 1850kcal/day;   Protein:93 grams (20% of estimated energy low end needs)  Stage of Behavior Change: Preparation     Nutrition Diagnosis:    Imbalanced intake of nutrients related to food choices/ food related knowledge evidenced by WC > 40, TG value of 220     Nutrition Intervention:  1.  Nutrition Education: Educated patient on cardioprotective meal pattern/Mediterranean meal pattern  vs ketogenic diet including   Guidelines for saturated fat (<7% total calories), sodium ( < 2000mg/day or follow MD recommendations), and added sugars ( < 25 grams for women/<35 grams for men) and high sources of each reviewed   Consumption of daily fiber intake with emphasis on 7-13 grams of soluble fiber daily and food sources reviewed.  Emphasis and sources of unsaturated fats and specific benefits of omega 3 fatty acids reviewed for cardioprotective benefit. Reviewed sources of saturated and unsaturated fats.  Emphasis on cardioprotective benefits of daily intake of plant-focused eating pattern.  Demonstrated portions sizes to support weight loss goals  2. Reviewed lab/body comp results/goals, and nutrition modifications that will support improvements in body composition and lab values. Handouts provided for home use:    Lab/body comp with values    Mediterranean pattern with 1 day sample menu.  Cherokee plate method   Weight loss tips    Nutrition Goals:    5-10% weight loss by increasing f/v to 5 servings per day, following meal plan,  decreasing portion sizes at meals, decrease mindless snacking, by end of cardiac/ pulmonary rehabilitation. Monitoring/Evaluation: RD to follow up with participant during rehab sessions for questions and assessment of progression toward goals.     Compliance: Pt agreeable to\" plan as long as he can lose weight\"   Barriers: None identified at this time     Mercedes Low, MS, RD, LD  Cardiac/Pulmonary Rehab Dietitian

## 2019-10-21 ENCOUNTER — APPOINTMENT (OUTPATIENT)
Dept: CARDIAC REHAB | Age: 74
End: 2019-10-21
Payer: MEDICARE

## 2019-10-21 ENCOUNTER — HOSPITAL ENCOUNTER (OUTPATIENT)
Dept: CARDIAC REHAB | Age: 74
Discharge: HOME | End: 2019-10-21
Payer: MEDICARE

## 2019-10-21 PROCEDURE — 93798 PHYS/QHP OP CAR RHAB W/ECG: CPT

## 2019-10-23 ENCOUNTER — APPOINTMENT (OUTPATIENT)
Dept: CARDIAC REHAB | Age: 74
End: 2019-10-23
Payer: MEDICARE

## 2019-10-25 ENCOUNTER — APPOINTMENT (OUTPATIENT)
Dept: CARDIAC REHAB | Age: 74
End: 2019-10-25
Payer: MEDICARE

## 2019-10-28 ENCOUNTER — APPOINTMENT (OUTPATIENT)
Dept: CARDIAC REHAB | Age: 74
End: 2019-10-28
Payer: MEDICARE

## 2019-10-28 ENCOUNTER — HOSPITAL ENCOUNTER (OUTPATIENT)
Dept: CARDIAC REHAB | Age: 74
Discharge: HOME | End: 2019-10-28
Payer: MEDICARE

## 2019-10-28 PROCEDURE — 93798 PHYS/QHP OP CAR RHAB W/ECG: CPT

## 2019-10-30 ENCOUNTER — HOSPITAL ENCOUNTER (OUTPATIENT)
Dept: CARDIAC REHAB | Age: 74
Discharge: HOME | End: 2019-10-30
Payer: MEDICARE

## 2019-10-30 ENCOUNTER — APPOINTMENT (OUTPATIENT)
Dept: CARDIAC REHAB | Age: 74
End: 2019-10-30
Payer: MEDICARE

## 2019-10-30 VITALS — BODY MASS INDEX: 32.08 KG/M2 | WEIGHT: 223.6 LBS

## 2019-10-30 PROCEDURE — 93798 PHYS/QHP OP CAR RHAB W/ECG: CPT

## 2019-11-01 ENCOUNTER — APPOINTMENT (OUTPATIENT)
Dept: CARDIAC REHAB | Age: 74
End: 2019-11-01
Payer: MEDICARE

## 2019-11-01 ENCOUNTER — HOSPITAL ENCOUNTER (OUTPATIENT)
Dept: CARDIAC REHAB | Age: 74
Discharge: HOME | End: 2019-11-01
Payer: MEDICARE

## 2019-11-01 PROCEDURE — 93798 PHYS/QHP OP CAR RHAB W/ECG: CPT

## 2019-11-04 ENCOUNTER — APPOINTMENT (OUTPATIENT)
Dept: CARDIAC REHAB | Age: 74
End: 2019-11-04
Payer: MEDICARE

## 2019-11-04 ENCOUNTER — HOSPITAL ENCOUNTER (OUTPATIENT)
Dept: CARDIAC REHAB | Age: 74
Discharge: HOME | End: 2019-11-04
Payer: MEDICARE

## 2019-11-04 PROCEDURE — 93798 PHYS/QHP OP CAR RHAB W/ECG: CPT

## 2019-11-06 ENCOUNTER — HOSPITAL ENCOUNTER (OUTPATIENT)
Dept: CARDIAC REHAB | Age: 74
Discharge: HOME | End: 2019-11-06
Payer: MEDICARE

## 2019-11-06 ENCOUNTER — APPOINTMENT (OUTPATIENT)
Dept: CARDIAC REHAB | Age: 74
End: 2019-11-06
Payer: MEDICARE

## 2019-11-06 VITALS — WEIGHT: 223.6 LBS | BODY MASS INDEX: 32.08 KG/M2

## 2019-11-06 PROCEDURE — 93798 PHYS/QHP OP CAR RHAB W/ECG: CPT

## 2019-11-08 ENCOUNTER — APPOINTMENT (OUTPATIENT)
Dept: CARDIAC REHAB | Age: 74
End: 2019-11-08
Payer: MEDICARE

## 2019-11-08 ENCOUNTER — HOSPITAL ENCOUNTER (OUTPATIENT)
Dept: CARDIAC REHAB | Age: 74
Discharge: HOME | End: 2019-11-08
Payer: MEDICARE

## 2019-11-08 PROCEDURE — 93798 PHYS/QHP OP CAR RHAB W/ECG: CPT

## 2019-11-11 ENCOUNTER — APPOINTMENT (OUTPATIENT)
Dept: CARDIAC REHAB | Age: 74
End: 2019-11-11
Payer: MEDICARE

## 2019-11-13 ENCOUNTER — APPOINTMENT (OUTPATIENT)
Dept: CARDIAC REHAB | Age: 74
End: 2019-11-13
Payer: MEDICARE

## 2019-11-13 ENCOUNTER — HOSPITAL ENCOUNTER (OUTPATIENT)
Dept: CARDIAC REHAB | Age: 74
Discharge: HOME | End: 2019-11-13
Payer: MEDICARE

## 2019-11-13 VITALS — BODY MASS INDEX: 31.57 KG/M2 | WEIGHT: 220 LBS

## 2019-11-13 PROCEDURE — 93798 PHYS/QHP OP CAR RHAB W/ECG: CPT

## 2019-11-15 ENCOUNTER — APPOINTMENT (OUTPATIENT)
Dept: CARDIAC REHAB | Age: 74
End: 2019-11-15
Payer: MEDICARE

## 2019-11-15 ENCOUNTER — HOSPITAL ENCOUNTER (OUTPATIENT)
Dept: CARDIAC REHAB | Age: 74
Discharge: HOME | End: 2019-11-15
Payer: MEDICARE

## 2019-11-15 PROCEDURE — 93798 PHYS/QHP OP CAR RHAB W/ECG: CPT

## 2019-11-18 ENCOUNTER — HOSPITAL ENCOUNTER (OUTPATIENT)
Dept: CARDIAC REHAB | Age: 74
Discharge: HOME | End: 2019-11-18
Payer: MEDICARE

## 2019-11-18 ENCOUNTER — APPOINTMENT (OUTPATIENT)
Dept: CARDIAC REHAB | Age: 74
End: 2019-11-18
Payer: MEDICARE

## 2019-11-18 PROCEDURE — 93798 PHYS/QHP OP CAR RHAB W/ECG: CPT

## 2019-11-20 ENCOUNTER — APPOINTMENT (OUTPATIENT)
Dept: CARDIAC REHAB | Age: 74
End: 2019-11-20
Payer: MEDICARE

## 2019-11-20 ENCOUNTER — HOSPITAL ENCOUNTER (OUTPATIENT)
Dept: CARDIAC REHAB | Age: 74
Discharge: HOME | End: 2019-11-20
Payer: MEDICARE

## 2019-11-20 VITALS — WEIGHT: 221.8 LBS | BODY MASS INDEX: 31.82 KG/M2

## 2019-11-20 PROCEDURE — 93798 PHYS/QHP OP CAR RHAB W/ECG: CPT

## 2019-11-22 ENCOUNTER — APPOINTMENT (OUTPATIENT)
Dept: CARDIAC REHAB | Age: 74
End: 2019-11-22
Payer: MEDICARE

## 2019-11-22 ENCOUNTER — HOSPITAL ENCOUNTER (OUTPATIENT)
Dept: CARDIAC REHAB | Age: 74
Discharge: HOME | End: 2019-11-22
Payer: MEDICARE

## 2019-11-22 PROCEDURE — 93798 PHYS/QHP OP CAR RHAB W/ECG: CPT

## 2019-11-25 ENCOUNTER — HOSPITAL ENCOUNTER (OUTPATIENT)
Dept: CARDIAC REHAB | Age: 74
Discharge: HOME | End: 2019-11-25
Payer: MEDICARE

## 2019-11-25 ENCOUNTER — APPOINTMENT (OUTPATIENT)
Dept: CARDIAC REHAB | Age: 74
End: 2019-11-25
Payer: MEDICARE

## 2019-11-25 PROCEDURE — 93798 PHYS/QHP OP CAR RHAB W/ECG: CPT

## 2019-11-27 ENCOUNTER — HOSPITAL ENCOUNTER (OUTPATIENT)
Dept: CARDIAC REHAB | Age: 74
Discharge: HOME | End: 2019-11-27
Payer: MEDICARE

## 2019-11-27 ENCOUNTER — APPOINTMENT (OUTPATIENT)
Dept: CARDIAC REHAB | Age: 74
End: 2019-11-27
Payer: MEDICARE

## 2019-11-27 VITALS — BODY MASS INDEX: 31.6 KG/M2 | WEIGHT: 220.2 LBS

## 2019-11-27 PROCEDURE — 93798 PHYS/QHP OP CAR RHAB W/ECG: CPT

## 2019-12-02 ENCOUNTER — APPOINTMENT (OUTPATIENT)
Dept: CARDIAC REHAB | Age: 74
End: 2019-12-02
Payer: MEDICARE

## 2019-12-02 ENCOUNTER — HOSPITAL ENCOUNTER (OUTPATIENT)
Dept: CARDIAC REHAB | Age: 74
Discharge: HOME | End: 2019-12-02
Payer: MEDICARE

## 2019-12-02 PROCEDURE — 93798 PHYS/QHP OP CAR RHAB W/ECG: CPT

## 2019-12-04 ENCOUNTER — APPOINTMENT (OUTPATIENT)
Dept: CARDIAC REHAB | Age: 74
End: 2019-12-04
Payer: MEDICARE

## 2019-12-04 ENCOUNTER — HOSPITAL ENCOUNTER (OUTPATIENT)
Dept: CARDIAC REHAB | Age: 74
Discharge: HOME | End: 2019-12-04
Payer: MEDICARE

## 2019-12-04 VITALS — BODY MASS INDEX: 31.8 KG/M2 | WEIGHT: 221.6 LBS

## 2019-12-04 PROCEDURE — 93798 PHYS/QHP OP CAR RHAB W/ECG: CPT

## 2019-12-06 ENCOUNTER — APPOINTMENT (OUTPATIENT)
Dept: CARDIAC REHAB | Age: 74
End: 2019-12-06
Payer: MEDICARE

## 2019-12-06 ENCOUNTER — HOSPITAL ENCOUNTER (OUTPATIENT)
Dept: CARDIAC REHAB | Age: 74
Discharge: HOME | End: 2019-12-06
Payer: MEDICARE

## 2019-12-06 PROCEDURE — 93798 PHYS/QHP OP CAR RHAB W/ECG: CPT

## 2019-12-09 ENCOUNTER — APPOINTMENT (OUTPATIENT)
Dept: CARDIAC REHAB | Age: 74
End: 2019-12-09
Payer: MEDICARE

## 2019-12-11 ENCOUNTER — APPOINTMENT (OUTPATIENT)
Dept: CARDIAC REHAB | Age: 74
End: 2019-12-11
Payer: MEDICARE

## 2019-12-13 ENCOUNTER — APPOINTMENT (OUTPATIENT)
Dept: CARDIAC REHAB | Age: 74
End: 2019-12-13
Payer: MEDICARE

## 2019-12-16 ENCOUNTER — APPOINTMENT (OUTPATIENT)
Dept: CARDIAC REHAB | Age: 74
End: 2019-12-16
Payer: MEDICARE

## 2019-12-18 ENCOUNTER — APPOINTMENT (OUTPATIENT)
Dept: CARDIAC REHAB | Age: 74
End: 2019-12-18
Payer: MEDICARE

## 2019-12-20 ENCOUNTER — APPOINTMENT (OUTPATIENT)
Dept: CARDIAC REHAB | Age: 74
End: 2019-12-20
Payer: MEDICARE

## 2019-12-20 ENCOUNTER — HOSPITAL ENCOUNTER (OUTPATIENT)
Dept: CARDIAC REHAB | Age: 74
End: 2019-12-20
Payer: MEDICARE

## 2019-12-23 ENCOUNTER — APPOINTMENT (OUTPATIENT)
Dept: CARDIAC REHAB | Age: 74
End: 2019-12-23
Payer: MEDICARE

## 2019-12-27 ENCOUNTER — APPOINTMENT (OUTPATIENT)
Dept: CARDIAC REHAB | Age: 74
End: 2019-12-27
Payer: MEDICARE

## 2019-12-30 ENCOUNTER — APPOINTMENT (OUTPATIENT)
Dept: CARDIAC REHAB | Age: 74
End: 2019-12-30
Payer: MEDICARE

## 2020-01-08 ENCOUNTER — HOSPITAL ENCOUNTER (OUTPATIENT)
Dept: CARDIAC REHAB | Age: 75
End: 2020-01-08

## 2020-01-08 ENCOUNTER — TELEPHONE (OUTPATIENT)
Dept: CARDIAC REHAB | Age: 75
End: 2020-01-08

## 2020-01-08 NOTE — TELEPHONE ENCOUNTER
Called to check on patient who has been absent since 12/6/19 due to lower back, hip and groin pain. He says he injured his knee in December and is awaiting an MRI. He has a trip planned 1/30-2/6/20 and hopes to return to cardiac rehab 2/10/20.

## 2020-01-10 ENCOUNTER — HOSPITAL ENCOUNTER (OUTPATIENT)
Dept: LAB | Age: 75
Discharge: HOME | End: 2020-01-10
Payer: MEDICARE

## 2020-01-10 ENCOUNTER — APPOINTMENT (OUTPATIENT)
Dept: CARDIAC REHAB | Age: 75
End: 2020-01-10

## 2020-01-10 DIAGNOSIS — Z95.1 S/P CABG (CORONARY ARTERY BYPASS GRAFT): ICD-10-CM

## 2020-01-10 DIAGNOSIS — E78.5 DYSLIPIDEMIA: Chronic | ICD-10-CM

## 2020-01-10 DIAGNOSIS — R94.39 ABNORMAL NUCLEAR STRESS TEST: ICD-10-CM

## 2020-01-10 LAB
ANION GAP SERPL CALC-SCNC: 8 MMOL/L (ref 7–16)
BASOPHILS # BLD: 0.1 K/UL (ref 0–0.2)
BASOPHILS NFR BLD: 1 % (ref 0–2)
BUN SERPL-MCNC: 21 MG/DL (ref 8–23)
CALCIUM SERPL-MCNC: 9.5 MG/DL (ref 8.3–10.4)
CHLORIDE SERPL-SCNC: 103 MMOL/L (ref 98–107)
CHOLEST SERPL-MCNC: 149 MG/DL
CO2 SERPL-SCNC: 30 MMOL/L (ref 21–32)
CREAT SERPL-MCNC: 1.4 MG/DL (ref 0.8–1.5)
DIFFERENTIAL METHOD BLD: ABNORMAL
EOSINOPHIL # BLD: 0.2 K/UL (ref 0–0.8)
EOSINOPHIL NFR BLD: 3 % (ref 0.5–7.8)
ERYTHROCYTE [DISTWIDTH] IN BLOOD BY AUTOMATED COUNT: 13.2 % (ref 11.9–14.6)
GLUCOSE SERPL-MCNC: 95 MG/DL (ref 65–100)
HCT VFR BLD AUTO: 44.7 % (ref 41.1–50.3)
HDLC SERPL-MCNC: 58 MG/DL (ref 40–60)
HDLC SERPL: 2.6 {RATIO}
HGB BLD-MCNC: 14.6 G/DL (ref 13.6–17.2)
IMM GRANULOCYTES # BLD AUTO: 0.1 K/UL (ref 0–0.5)
IMM GRANULOCYTES NFR BLD AUTO: 1 % (ref 0–5)
LDLC SERPL CALC-MCNC: 69.6 MG/DL
LIPID PROFILE,FLP: NORMAL
LYMPHOCYTES # BLD: 1.8 K/UL (ref 0.5–4.6)
LYMPHOCYTES NFR BLD: 26 % (ref 13–44)
MCH RBC QN AUTO: 28.8 PG (ref 26.1–32.9)
MCHC RBC AUTO-ENTMCNC: 32.7 G/DL (ref 31.4–35)
MCV RBC AUTO: 88.2 FL (ref 79.6–97.8)
MONOCYTES # BLD: 0.8 K/UL (ref 0.1–1.3)
MONOCYTES NFR BLD: 12 % (ref 4–12)
NEUTS SEG # BLD: 3.8 K/UL (ref 1.7–8.2)
NEUTS SEG NFR BLD: 57 % (ref 43–78)
NRBC # BLD: 0 K/UL (ref 0–0.2)
PLATELET # BLD AUTO: 291 K/UL (ref 150–450)
PMV BLD AUTO: 8.6 FL (ref 9.4–12.3)
POTASSIUM SERPL-SCNC: 4.4 MMOL/L (ref 3.5–5.1)
RBC # BLD AUTO: 5.07 M/UL (ref 4.23–5.6)
SODIUM SERPL-SCNC: 141 MMOL/L (ref 136–145)
TRIGL SERPL-MCNC: 107 MG/DL (ref 35–150)
VLDLC SERPL CALC-MCNC: 21.4 MG/DL (ref 6–23)
WBC # BLD AUTO: 6.8 K/UL (ref 4.3–11.1)

## 2020-01-10 PROCEDURE — 80061 LIPID PANEL: CPT

## 2020-01-10 PROCEDURE — 36415 COLL VENOUS BLD VENIPUNCTURE: CPT

## 2020-01-10 PROCEDURE — 80048 BASIC METABOLIC PNL TOTAL CA: CPT

## 2020-01-10 PROCEDURE — 85025 COMPLETE CBC W/AUTO DIFF WBC: CPT

## 2020-01-13 ENCOUNTER — APPOINTMENT (OUTPATIENT)
Dept: CARDIAC REHAB | Age: 75
End: 2020-01-13

## 2020-01-15 ENCOUNTER — APPOINTMENT (OUTPATIENT)
Dept: CARDIAC REHAB | Age: 75
End: 2020-01-15

## 2020-01-17 ENCOUNTER — APPOINTMENT (OUTPATIENT)
Dept: CARDIAC REHAB | Age: 75
End: 2020-01-17

## 2020-01-20 ENCOUNTER — APPOINTMENT (OUTPATIENT)
Dept: CARDIAC REHAB | Age: 75
End: 2020-01-20

## 2020-01-22 ENCOUNTER — APPOINTMENT (OUTPATIENT)
Dept: CARDIAC REHAB | Age: 75
End: 2020-01-22

## 2020-01-24 ENCOUNTER — APPOINTMENT (OUTPATIENT)
Dept: CARDIAC REHAB | Age: 75
End: 2020-01-24

## 2020-01-27 ENCOUNTER — HOSPITAL ENCOUNTER (OUTPATIENT)
Dept: CARDIAC REHAB | Age: 75
End: 2020-01-27

## 2020-01-29 ENCOUNTER — HOSPITAL ENCOUNTER (OUTPATIENT)
Dept: CARDIAC REHAB | Age: 75
Discharge: HOME | End: 2020-01-29

## 2020-01-31 ENCOUNTER — APPOINTMENT (OUTPATIENT)
Dept: CARDIAC REHAB | Age: 75
End: 2020-01-31

## 2020-02-03 ENCOUNTER — APPOINTMENT (OUTPATIENT)
Dept: CARDIAC REHAB | Age: 75
End: 2020-02-03
Payer: MEDICARE

## 2020-02-12 ENCOUNTER — HOSPITAL ENCOUNTER (OUTPATIENT)
Dept: CARDIAC REHAB | Age: 75
End: 2020-02-12
Payer: MEDICARE

## 2020-02-12 ENCOUNTER — TELEPHONE (OUTPATIENT)
Dept: CARDIAC REHAB | Age: 75
End: 2020-02-12

## 2020-02-12 NOTE — TELEPHONE ENCOUNTER
Called to check on patient; he last attended cardiac rehab in December due to holidays and travel. He had hoped to return 2/10/2020, but he has had bronchitis and now gout. He says he plans to return when he recovers from these.

## 2020-02-14 ENCOUNTER — HOSPITAL ENCOUNTER (OUTPATIENT)
Dept: CARDIAC REHAB | Age: 75
Discharge: HOME | End: 2020-02-14
Payer: MEDICARE

## 2020-02-17 ENCOUNTER — HOSPITAL ENCOUNTER (OUTPATIENT)
Dept: CARDIAC REHAB | Age: 75
End: 2020-02-17
Payer: MEDICARE

## 2020-02-19 ENCOUNTER — HOSPITAL ENCOUNTER (OUTPATIENT)
Dept: CARDIAC REHAB | Age: 75
Discharge: HOME | End: 2020-02-19
Payer: MEDICARE

## 2020-02-19 VITALS — BODY MASS INDEX: 32 KG/M2 | WEIGHT: 223 LBS

## 2020-02-19 PROCEDURE — 93798 PHYS/QHP OP CAR RHAB W/ECG: CPT

## 2020-02-21 ENCOUNTER — HOSPITAL ENCOUNTER (OUTPATIENT)
Dept: CARDIAC REHAB | Age: 75
Discharge: HOME | End: 2020-02-21
Payer: MEDICARE

## 2020-02-21 PROCEDURE — 93798 PHYS/QHP OP CAR RHAB W/ECG: CPT

## 2020-02-24 ENCOUNTER — HOSPITAL ENCOUNTER (OUTPATIENT)
Dept: CARDIAC REHAB | Age: 75
Discharge: HOME | End: 2020-02-24
Payer: MEDICARE

## 2020-02-24 VITALS — BODY MASS INDEX: 32 KG/M2 | WEIGHT: 223 LBS

## 2020-02-24 PROCEDURE — 93798 PHYS/QHP OP CAR RHAB W/ECG: CPT

## 2020-02-28 ENCOUNTER — APPOINTMENT (OUTPATIENT)
Dept: CARDIAC REHAB | Age: 75
End: 2020-02-28
Payer: MEDICARE

## 2020-03-02 ENCOUNTER — HOSPITAL ENCOUNTER (OUTPATIENT)
Dept: CARDIAC REHAB | Age: 75
Discharge: HOME | End: 2020-03-02
Payer: MEDICARE

## 2020-03-02 PROCEDURE — 93798 PHYS/QHP OP CAR RHAB W/ECG: CPT

## 2020-03-04 ENCOUNTER — HOSPITAL ENCOUNTER (OUTPATIENT)
Dept: CARDIAC REHAB | Age: 75
Discharge: HOME | End: 2020-03-04
Payer: MEDICARE

## 2020-03-04 VITALS — BODY MASS INDEX: 32.14 KG/M2 | WEIGHT: 224 LBS

## 2020-03-04 PROCEDURE — 93798 PHYS/QHP OP CAR RHAB W/ECG: CPT

## 2020-03-06 ENCOUNTER — HOSPITAL ENCOUNTER (OUTPATIENT)
Dept: CARDIAC REHAB | Age: 75
Discharge: HOME | End: 2020-03-06
Payer: MEDICARE

## 2020-03-06 PROCEDURE — 93798 PHYS/QHP OP CAR RHAB W/ECG: CPT

## 2020-03-09 ENCOUNTER — HOSPITAL ENCOUNTER (OUTPATIENT)
Dept: CARDIAC REHAB | Age: 75
End: 2020-03-09
Payer: MEDICARE

## 2020-03-11 ENCOUNTER — HOSPITAL ENCOUNTER (OUTPATIENT)
Dept: CARDIAC REHAB | Age: 75
Discharge: HOME | End: 2020-03-11
Payer: MEDICARE

## 2020-03-11 VITALS — WEIGHT: 227 LBS | BODY MASS INDEX: 32.57 KG/M2

## 2020-03-11 PROCEDURE — 93798 PHYS/QHP OP CAR RHAB W/ECG: CPT

## 2020-03-13 ENCOUNTER — HOSPITAL ENCOUNTER (OUTPATIENT)
Dept: CARDIAC REHAB | Age: 75
Discharge: HOME | End: 2020-03-13
Payer: MEDICARE

## 2020-03-13 PROCEDURE — 93798 PHYS/QHP OP CAR RHAB W/ECG: CPT

## 2020-05-18 ENCOUNTER — APPOINTMENT (OUTPATIENT)
Dept: CARDIAC REHAB | Age: 75
End: 2020-05-18

## 2020-05-22 ENCOUNTER — APPOINTMENT (OUTPATIENT)
Dept: CARDIAC REHAB | Age: 75
End: 2020-05-22

## 2020-05-29 ENCOUNTER — APPOINTMENT (OUTPATIENT)
Dept: CARDIAC REHAB | Age: 75
End: 2020-05-29

## 2020-06-03 ENCOUNTER — HOSPITAL ENCOUNTER (OUTPATIENT)
Dept: CARDIAC REHAB | Age: 75
Discharge: HOME | End: 2020-06-03
Payer: MEDICARE

## 2020-06-03 VITALS — WEIGHT: 228.2 LBS | BODY MASS INDEX: 32.74 KG/M2

## 2020-06-03 PROCEDURE — 93798 PHYS/QHP OP CAR RHAB W/ECG: CPT

## 2020-06-05 ENCOUNTER — HOSPITAL ENCOUNTER (OUTPATIENT)
Dept: CARDIAC REHAB | Age: 75
Discharge: HOME | End: 2020-06-05
Payer: MEDICARE

## 2020-06-05 VITALS — BODY MASS INDEX: 32.4 KG/M2 | WEIGHT: 225.8 LBS

## 2020-06-05 PROCEDURE — 93798 PHYS/QHP OP CAR RHAB W/ECG: CPT

## 2020-06-08 ENCOUNTER — HOSPITAL ENCOUNTER (OUTPATIENT)
Dept: CARDIAC REHAB | Age: 75
Discharge: HOME | End: 2020-06-08
Payer: MEDICARE

## 2020-06-08 PROCEDURE — 93798 PHYS/QHP OP CAR RHAB W/ECG: CPT

## 2020-06-10 ENCOUNTER — HOSPITAL ENCOUNTER (OUTPATIENT)
Dept: CARDIAC REHAB | Age: 75
Discharge: HOME | End: 2020-06-10
Payer: MEDICARE

## 2020-06-10 VITALS — WEIGHT: 228.4 LBS | BODY MASS INDEX: 32.77 KG/M2

## 2020-06-10 PROCEDURE — 93798 PHYS/QHP OP CAR RHAB W/ECG: CPT

## 2020-06-12 ENCOUNTER — HOSPITAL ENCOUNTER (OUTPATIENT)
Dept: CARDIAC REHAB | Age: 75
Discharge: HOME | End: 2020-06-12
Payer: MEDICARE

## 2020-06-12 PROCEDURE — 93798 PHYS/QHP OP CAR RHAB W/ECG: CPT

## 2020-06-15 ENCOUNTER — HOSPITAL ENCOUNTER (OUTPATIENT)
Dept: CARDIAC REHAB | Age: 75
Discharge: HOME | End: 2020-06-15
Payer: MEDICARE

## 2020-06-15 PROCEDURE — 93798 PHYS/QHP OP CAR RHAB W/ECG: CPT

## 2021-06-05 NOTE — PROGRESS NOTES
"REPORT GIVEN TO: Teri URIARTE   VITALS:BP 97/69   Pulse 80   Temp 98.1  F (36.7  C) (Oral)   Resp 23   Ht 5' 6\" (1.676 m)   Wt 191 lb 5 oz (86.8 kg)   SpO2 95%   BMI 30.88 kg/m    MEDS: 100mcg fentanyl, 2 mg versed  BIOPSY: LLL lung bx with fiducial  SITE ASSESSMENT: site assessed jointly with Teri.  Vaseline gauze and tegaderm intact.  No concerns noted.      " Dear Dr. Dustin Hayes,    Thank you for referring your patient,Mr. Gilberto Patricio ( 1945), to the Cardiopulmonary Rehabilitation Program at [address] [place] [address]. He is a good candidate for the Cardiac Rehab Program and should see improvements with regular participation. We will be addressing appropriate interventions for modifiable risk factors with your patient during the next 12 weeks. We will contact you with any issues or concerns that may arise, or you can follow your patient's progress through [address] [place] [address] at any time. A final summary will be sent to you when the program is completed. Again, thank you for the referral. If we can be of further assistance, please feel free to contact the Cardiopulmonary Rehab staff at [phone].     Sincerely,    OZ Call, RN  Cardiopulmonary Rehabilitation Nurse Liaison  [facility]

## 2021-07-07 ENCOUNTER — HOSPITAL ENCOUNTER (OUTPATIENT)
Dept: LAB | Age: 76
Discharge: HOME | End: 2021-07-07
Attending: NURSE PRACTITIONER
Payer: MEDICARE

## 2021-07-07 LAB
25(OH)D3 SERPL-MCNC: 33.8 NG/ML (ref 30–100)
ALBUMIN SERPL-MCNC: 3.9 G/DL (ref 3.2–4.6)
ALBUMIN/GLOB SERPL: 1.3 {RATIO} (ref 1.2–3.5)
ALP SERPL-CCNC: 53 U/L (ref 50–136)
ALT SERPL-CCNC: 31 U/L (ref 12–65)
ANION GAP SERPL CALC-SCNC: 8 MMOL/L (ref 7–16)
AST SERPL-CCNC: 16 U/L (ref 15–37)
BASOPHILS # BLD: 0 K/UL (ref 0–0.2)
BASOPHILS NFR BLD: 1 % (ref 0–2)
BILIRUB SERPL-MCNC: 0.6 MG/DL (ref 0.2–1.1)
BUN SERPL-MCNC: 19 MG/DL (ref 8–23)
CALCIUM SERPL-MCNC: 9 MG/DL (ref 8.3–10.4)
CALCIUM SERPL-MCNC: 9.2 MG/DL (ref 8.3–10.4)
CHLORIDE SERPL-SCNC: 103 MMOL/L (ref 98–107)
CHOLEST SERPL-MCNC: 296 MG/DL
CO2 SERPL-SCNC: 27 MMOL/L (ref 21–32)
COLLECT DURATION TIME UR: 24 HR
CREAT SERPL-MCNC: 1.13 MG/DL (ref 0.8–1.5)
DIFFERENTIAL METHOD BLD: NORMAL
EOSINOPHIL # BLD: 0.2 K/UL (ref 0–0.8)
EOSINOPHIL NFR BLD: 3 % (ref 0.5–7.8)
ERYTHROCYTE [DISTWIDTH] IN BLOOD BY AUTOMATED COUNT: 13.5 % (ref 11.9–14.6)
EST. AVERAGE GLUCOSE BLD GHB EST-MCNC: 105 MG/DL
FERRITIN SERPL-MCNC: 151 NG/ML (ref 8–388)
GLOBULIN SER CALC-MCNC: 2.9 G/DL (ref 2.3–3.5)
GLUCOSE SERPL-MCNC: 81 MG/DL (ref 65–100)
HBA1C MFR BLD: 5.3 % (ref 4.2–6.3)
HCT VFR BLD AUTO: 43.6 % (ref 41.1–50.3)
HDLC SERPL-MCNC: 58 MG/DL (ref 40–60)
HDLC SERPL: 5.1 {RATIO}
HGB BLD-MCNC: 14.5 G/DL (ref 13.6–17.2)
IMM GRANULOCYTES # BLD AUTO: 0.1 K/UL (ref 0–0.5)
IMM GRANULOCYTES NFR BLD AUTO: 1 % (ref 0–5)
IRON SERPL-MCNC: 87 UG/DL (ref 35–150)
LDLC SERPL CALC-MCNC: 191 MG/DL
LYMPHOCYTES # BLD: 2.2 K/UL (ref 0.5–4.6)
LYMPHOCYTES NFR BLD: 28 % (ref 13–44)
MAGNESIUM SERPL-MCNC: 2.5 MG/DL (ref 1.8–2.4)
MCH RBC QN AUTO: 28.2 PG (ref 26.1–32.9)
MCHC RBC AUTO-ENTMCNC: 33.3 G/DL (ref 31.4–35)
MCV RBC AUTO: 84.8 FL (ref 79.6–97.8)
MONOCYTES # BLD: 0.8 K/UL (ref 0.1–1.3)
MONOCYTES NFR BLD: 11 % (ref 4–12)
NEUTS SEG # BLD: 4.4 K/UL (ref 1.7–8.2)
NEUTS SEG NFR BLD: 58 % (ref 43–78)
NRBC # BLD: 0 K/UL (ref 0–0.2)
PHOSPHATE SERPL-MCNC: 3.7 MG/DL (ref 2.3–3.7)
PLATELET # BLD AUTO: 273 K/UL (ref 150–450)
PMV BLD AUTO: 9.5 FL (ref 9.4–12.3)
POTASSIUM SERPL-SCNC: 4 MMOL/L (ref 3.5–5.1)
PROT 24H UR-MRATE: 138 MG/24HR
PROT SERPL-MCNC: 6.8 G/DL (ref 6.3–8.2)
PROT UR-MCNC: 8 MG/DL
PTH-INTACT SERPL-MCNC: 52.8 PG/ML (ref 18.5–88)
RBC # BLD AUTO: 5.14 M/UL (ref 4.23–5.6)
SODIUM SERPL-SCNC: 138 MMOL/L (ref 136–145)
SPECIMEN VOL ?TM UR: 1725 ML
TRIGL SERPL-MCNC: 235 MG/DL (ref 35–150)
VLDLC SERPL CALC-MCNC: 47 MG/DL (ref 6–23)
WBC # BLD AUTO: 7.7 K/UL (ref 4.3–11.1)

## 2021-07-07 PROCEDURE — 84156 ASSAY OF PROTEIN URINE: CPT

## 2021-07-07 PROCEDURE — 83036 HEMOGLOBIN GLYCOSYLATED A1C: CPT

## 2021-07-07 PROCEDURE — 85025 COMPLETE CBC W/AUTO DIFF WBC: CPT

## 2021-07-07 PROCEDURE — 80053 COMPREHEN METABOLIC PANEL: CPT

## 2021-07-07 PROCEDURE — 36415 COLL VENOUS BLD VENIPUNCTURE: CPT

## 2021-07-07 PROCEDURE — 84100 ASSAY OF PHOSPHORUS: CPT

## 2021-07-07 PROCEDURE — 83970 ASSAY OF PARATHORMONE: CPT

## 2021-07-07 PROCEDURE — 82728 ASSAY OF FERRITIN: CPT

## 2021-07-07 PROCEDURE — 82306 VITAMIN D 25 HYDROXY: CPT

## 2021-07-07 PROCEDURE — 83540 ASSAY OF IRON: CPT

## 2021-07-07 PROCEDURE — 83735 ASSAY OF MAGNESIUM: CPT

## 2021-07-07 PROCEDURE — 80061 LIPID PANEL: CPT

## 2021-07-08 LAB
COLLECT DURATION TIME UR: 24 HR
CREAT 24H UR-MRATE: 1938 MG/24HR (ref 800–2000)
PROT 24H UR-MRATE: 119 MG/24HR
PROT/CREAT 24H UR-RTO: 0.06 RATIO
SPECIMEN VOL ?TM UR: 1700 ML

## 2022-03-19 PROBLEM — I70.0 CALCIFICATION OF ABDOMINAL AORTA (HCC): Status: ACTIVE | Noted: 2019-07-22

## 2022-03-19 PROBLEM — R94.39 ABNORMAL NUCLEAR STRESS TEST: Status: ACTIVE | Noted: 2019-09-06

## 2022-03-19 PROBLEM — I25.10 CAD (CORONARY ARTERY DISEASE): Status: ACTIVE | Noted: 2019-09-16

## 2022-05-02 PROBLEM — F33.0 MILD EPISODE OF RECURRENT MAJOR DEPRESSIVE DISORDER (HCC): Status: ACTIVE | Noted: 2022-05-02

## 2022-05-04 ENCOUNTER — HOSPITAL ENCOUNTER (OUTPATIENT)
Dept: ULTRASOUND IMAGING | Age: 77
Discharge: HOME | End: 2022-05-04
Attending: FAMILY MEDICINE

## 2022-05-04 DIAGNOSIS — N50.812 PAIN IN LEFT TESTICLE: ICD-10-CM

## 2022-05-05 NOTE — PROGRESS NOTES
Per  [facility]: No tumor. Fluid filled sac on left should cause no problem. Follow up with surgeon if still having symptoms.- Notified via Scoop[employer] message.

## 2022-05-12 ENCOUNTER — HOSPITAL ENCOUNTER (OUTPATIENT)
Dept: LAB | Age: 77
Discharge: HOME | End: 2022-05-12
Payer: MEDICARE

## 2022-05-12 DIAGNOSIS — E78.5 DYSLIPIDEMIA: Chronic | ICD-10-CM

## 2022-05-12 LAB
CHOLEST SERPL-MCNC: 202 MG/DL
HDLC SERPL-MCNC: 55 MG/DL (ref 40–60)
HDLC SERPL: 3.7 {RATIO}
LDLC SERPL CALC-MCNC: 96 MG/DL
TRIGL SERPL-MCNC: 255 MG/DL (ref 35–150)
VLDLC SERPL CALC-MCNC: 51 MG/DL (ref 6–23)

## 2022-05-12 PROCEDURE — 36415 COLL VENOUS BLD VENIPUNCTURE: CPT

## 2022-05-12 PROCEDURE — 80061 LIPID PANEL: CPT

## 2022-06-09 ENCOUNTER — TELEPHONE (OUTPATIENT)
Dept: CARDIOLOGY CLINIC | Age: 77
End: 2022-06-09

## 2022-06-09 NOTE — TELEPHONE ENCOUNTER
Has been having pain on R side of neck for a couple of weeks. Feels like pressure in that artery. Does have a shoulder injury sd not sure if that is a contributing factor.

## 2022-06-09 NOTE — TELEPHONE ENCOUNTER
· Pain and pressure over carotid artery in right side of neck for about 3-4 weeks. · Feels like he has increased pressure in blood vessel in right side of neck. · No redness, swelling, or heat in right side of neck. · Re-injured right shoulder when lifting something heavy about 3-4 weeks ago, and may need repeat right shoulder surgery. · Going away on vacation, tomorrow. Patient asks if he should have carotid US. Asks for Dr. Bridget Lane recommendations.

## 2022-06-10 NOTE — TELEPHONE ENCOUNTER
The artery would not necessarily cause pain and not sure what is going on with that I do not think is purely cardiac she may need to get his family doctor set up he is going off of vacation may need to go to the ER to get it looked at.

## 2022-07-18 ENCOUNTER — OFFICE VISIT (OUTPATIENT)
Dept: UROLOGY | Age: 77
End: 2022-07-18
Payer: MEDICARE

## 2022-07-18 DIAGNOSIS — K40.90 LEFT INGUINAL HERNIA: ICD-10-CM

## 2022-07-18 DIAGNOSIS — N50.812 LEFT TESTICULAR PAIN: Primary | ICD-10-CM

## 2022-07-18 DIAGNOSIS — N40.0 BENIGN PROSTATIC HYPERPLASIA WITHOUT LOWER URINARY TRACT SYMPTOMS: ICD-10-CM

## 2022-07-18 DIAGNOSIS — N52.01 ERECTILE DYSFUNCTION DUE TO ARTERIAL INSUFFICIENCY: ICD-10-CM

## 2022-07-18 LAB
BILIRUBIN, URINE, POC: NEGATIVE
BLOOD URINE, POC: NEGATIVE
GLUCOSE URINE, POC: NEGATIVE
KETONES, URINE, POC: NEGATIVE
LEUKOCYTE ESTERASE, URINE, POC: NEGATIVE
NITRITE, URINE, POC: NEGATIVE
PH, URINE, POC: 6 (ref 4.6–8)
PROTEIN,URINE, POC: NEGATIVE
PSA SERPL-MCNC: 1.8 NG/ML
SPECIFIC GRAVITY, URINE, POC: 1.03 (ref 1–1.03)
URINALYSIS CLARITY, POC: NORMAL
URINALYSIS COLOR, POC: NORMAL
UROBILINOGEN, POC: NORMAL

## 2022-07-18 PROCEDURE — 3017F COLORECTAL CA SCREEN DOC REV: CPT | Performed by: UROLOGY

## 2022-07-18 PROCEDURE — 1036F TOBACCO NON-USER: CPT | Performed by: UROLOGY

## 2022-07-18 PROCEDURE — 1123F ACP DISCUSS/DSCN MKR DOCD: CPT | Performed by: UROLOGY

## 2022-07-18 PROCEDURE — G8417 CALC BMI ABV UP PARAM F/U: HCPCS | Performed by: UROLOGY

## 2022-07-18 PROCEDURE — G8427 DOCREV CUR MEDS BY ELIG CLIN: HCPCS | Performed by: UROLOGY

## 2022-07-18 PROCEDURE — 81003 URINALYSIS AUTO W/O SCOPE: CPT | Performed by: UROLOGY

## 2022-07-18 PROCEDURE — 99204 OFFICE O/P NEW MOD 45 MIN: CPT | Performed by: UROLOGY

## 2022-07-18 RX ORDER — ASPIRIN 81 MG/1
81 TABLET ORAL DAILY
COMMUNITY

## 2022-07-18 ASSESSMENT — ENCOUNTER SYMPTOMS
EYE DISCHARGE: 0
WHEEZING: 1
SKIN LESIONS: 0
VOMITING: 0
HEARTBURN: 0
BLOOD IN STOOL: 0
INDIGESTION: 0
COUGH: 0
DIARRHEA: 0
EYE PAIN: 0
CONSTIPATION: 0
SHORTNESS OF BREATH: 1
BACK PAIN: 0
NAUSEA: 0
ABDOMINAL PAIN: 0

## 2022-07-18 NOTE — PROGRESS NOTES
[facility]  [address] [place] [address]    [address]  [place], [address] [facility]  [address] : 1945    Chief Complaint   Patient presents with    New Patient    Testicle Pain          HPI     Jing Gant is a 78 y.o.  male who is referred to clinic for L testicle pain / swelling and ED. His main concern today is L groin swelling and pain. Scrotal US performed 2022 showed large L hydrocele per radiology report. He saw Dr. Edmar Osorio with General Surgery, who felt that this actually was hernia and NOT a true hydrocele. Surgical repair was recommended but he decided to hold off. At his appointment with Dr. Edmar Osorio, he complained of ED. However, today, he states that he has no bothersome ED and does not want to discuss treatment options. Of note, he has had surgery at age 13 on his R testicle after testicle trauma as a child and states that the R testicle has always been larger to palpation as a result. Denies urgency, frequency, retention, urinary incontinence, hematuria, Not on bladder / prostate meds. Has not had recent PSA/REBECCA. Imaging personally reviewed by me today.       No results found for: PSA, PSADIA      Past Medical History:   Diagnosis Date    Asthma     child    CAD (coronary artery disease)     Cardiac arrhythmia     atrial fib    Chronic kidney disease     stage 2    GERD (gastroesophageal reflux disease)     Gout     HLD (hyperlipidemia)     Hypercholesterolemia     Hypertension     Liver disease     ?? growth on liver    Prostatism     PUD (peptic ulcer disease)     15 years    Stroke [facility])      Past Surgical History:   Procedure Laterality Date    CABG, ARTERIAL, THREE  4/13/11    x3    CARDIAC CATHETERIZATION      HEENT       sinus surgery    ORTHOPEDIC SURGERY      right shoulder, left knee and left foot    OTHER SURGICAL HISTORY  2011    Quadruple bypass    UT CARDIAC SURG PROCEDURE UNLIST      CABG, 3 vessels, 4/13/2011     Current Outpatient Medications   Medication Sig Dispense Refill    aspirin 81 MG EC tablet Take 81 mg by mouth in the morning. alirocumab (PRALUENT) 75 MG/ML SOAJ injection pen Inject 75 mg into the skin      ascorbic acid (VITAMIN C) 500 MG tablet Take 1,000 mg by mouth      Coenzyme Q10 10 MG CAPS Take by mouth      cyanocobalamin 100 MCG tablet Take 400 mcg by mouth daily      furosemide (LASIX) 40 MG tablet Take 40 mg by mouth daily as needed      LORazepam (ATIVAN) 1 MG tablet Take 1 mg by mouth 2 times daily as needed. montelukast (SINGULAIR) 10 MG tablet TAKE 1 TABLET BY MOUTH EVERY DAY      nitroGLYCERIN (NITROSTAT) 0.4 MG SL tablet Place 0.4 mg under the tongue      PARoxetine (PAXIL) 20 MG tablet Take 20 mg by mouth every evening      potassium chloride (KLOR-CON M) 20 MEQ extended release tablet Take 20 mEq by mouth daily as needed      thiamine 100 MG tablet Take 400 mg by mouth daily      vitamin E 400 UNIT capsule Take by mouth daily      zaleplon (SONATA) 5 MG capsule Take 5 mg by mouth.       APPLE CIDER VINEGAR PO Take by mouth (Patient not taking: Reported on 4/90/3540)      GARLIC PO Take by mouth (Patient not taking: Reported on 7/18/2022)      HAWTHORN BERRY PO Take by mouth (Patient not taking: Reported on 7/18/2022)      LECITHIN PO Take by mouth (Patient not taking: Reported on 7/18/2022)      clopidogrel (PLAVIX) 75 MG tablet Take 75 mg by mouth daily (Patient not taking: Reported on 7/18/2022)      losartan (COZAAR) 100 MG tablet Take 100 mg by mouth daily (Patient not taking: Reported on 7/18/2022)      metoprolol succinate (TOPROL XL) 25 MG extended release tablet Take 25 mg by mouth daily (Patient not taking: Reported on 7/18/2022)      omeprazole (PRILOSEC) 20 MG delayed release capsule Take 20 mg by mouth daily (Patient not taking: Reported on 7/18/2022)      raNITIdine (ZANTAC) 150 MG tablet Take 150 mg by mouth daily (Patient not taking: Reported on 2022)      valsartan (DIOVAN) 80 MG tablet Take 80 mg by mouth daily (Patient not taking: Reported on 2022)       No current facility-administered medications for this visit. Allergies   Allergen Reactions    Indomethacin Shortness Of Breath     Severe dizziness    Atorvastatin Other (See Comments)    Rosuvastatin Other (See Comments)     CONFUSION     Social History     Socioeconomic History    Marital status:      Spouse name: Not on file    Number of children: Not on file    Years of education: Not on file    Highest education level: Not on file   Occupational History    Not on file   Tobacco Use    Smoking status: Former     Packs/day: 1.50     Types: Cigarettes     Quit date: 1970     Years since quittin.2    Smokeless tobacco: Former     Quit date: 2018    Tobacco comments:     Quit smoking: chews tobacco   Substance and Sexual Activity    Alcohol use: Yes     Alcohol/week: 1.7 standard drinks    Drug use: No    Sexual activity: Not on file   Other Topics Concern    Not on file   Social History Narrative    Not on file     Social Determinants of Health     Financial Resource Strain: Not on file   Food Insecurity: Not on file   Transportation Needs: Not on file   Physical Activity: Not on file   Stress: Not on file   Social Connections: Not on file   Intimate Partner Violence: Not on file   Housing Stability: Not on file     Family History   Problem Relation Age of Onset    Alzheimer's Disease Mother     Stroke Father     Heart Disease Father        Review of Systems  Constitutional:   Negative for fever, chills, appetite change, malaise/fatigue, headaches and weight loss. Skin:  Negative for skin lesions, rash and itching. Eyes:  Negative for visual disturbance, eye pain and eye discharge. ENT:  Negative for difficulty articulating words, pain swallowing, high frequency hearing loss and dry mouth. Respiratory: Positive for shortness of breath and wheezing.  Negative for cough and blood in sputum. Cardiovascular: Positive for varicose veins. Negative for chest pain, hypertension, irregular heartbeat, leg pain, leg swelling and regular rate and rhythm. GI:  Negative for nausea, vomiting, abdominal pain, blood in stool, constipation, diarrhea, indigestion and heartburn. Genitourinary: Positive for nocturia, leakage w/ urge and testicular pain. Negative for urinary burning, hematuria, flank pain, recurrent UTIs, history of urolithiasis, slower stream, straining, urgency, frequent urination, incomplete emptying, erectile dysfunction, sexually transmitted disease, discharge and urethral stricture. Musculoskeletal:  Negative for back pain, bone pain, arthralgias, tenderness, muscle weakness and neck pain. Neurological: Positive for focal weakness. Negative for dizziness, numbness, seizures and tremors. Psychological: Positive for depression. Negative for psychiatric problem. Endocrine:  Negative for cold intolerance, thirst, excessive urination, fatigue and heat intolerance. Hem/Lymphatic:  Negative for easy bleeding, easy bruising and frequent infections.     Urinalysis  UA - Dipstick  Results for orders placed or performed in visit on 07/18/22   AMB POC URINALYSIS DIP STICK AUTO W/O MICRO   Result Value Ref Range    Color (UA POC)      Clarity (UA POC)      Glucose, Urine, POC Negative Negative    Bilirubin, Urine, POC Negative Negative    KETONES, Urine, POC Negative Negative    Specific Gravity, Urine, POC 1.030 1.001 - 1.035    Blood (UA POC) Negative Negative    pH, Urine, POC 6.0 4.6 - 8.0    Protein, Urine, POC Negative Negative    Urobilinogen, POC 0.2 mg/dL     Nitrite, Urine, POC Negative Negative    Leukocyte Esterase, Urine, POC Negative Negative   Results for orders placed or performed in visit on 07/29/19   AMB POC URINALYSIS DIP STICK AUTO W/O MICRO   Result Value Ref Range    Color (UA POC) Yellow     Clarity (UA POC) Clear     Glucose, Urine, POC Negative Negative Bilirubin, Urine, POC Negative Negative    KETONES, Urine, POC Negative Negative    Specific Gravity, Urine, POC 1.020 1.001 - 1.035 NA    Blood (UA POC) Negative Negative    pH, Urine, POC 6.5 4.6 - 8.0 NA    Protein, Urine, POC Negative Negative    Urobilinogen, POC normal 0.2 - 1    Nitrite, Urine, POC Negative Negative    Leukocyte Esterase, Urine, POC Negative Negative       There were no vitals taken for this visit. GENERAL: No acute distress, Awake, Alert, Oriented X 3, Gait normal  CARDIAC: regular rate and rhythm  CHEST AND LUNG: Easy work of breathing, clear to auscultation bilaterally, no cyanosis  ABDOMEN: soft, non tender, non-distended, positive bowel sounds, no organomegaly, no palpable masses, no guarding, no rebound tenderness  : L > R inguinal hernia with small L hydrocele. Tenderness in L inguinal region at site of hernia. Hernia reducible without signs of gangrene. Testicles descended in scrotum bilaterally and without palpable mass. REBECCA: 25 gram, symmetric, smooth without nodules. SKIN: No rash, no erythema, no lacerations or abrasions, no ecchymosis  NEUROLOGIC: cranial nerves 2-12 grossly intact     Scrotal US: 5/4/2022    1. No intratesticular mass or evidence of torsion. 2.  Large left hydrocele. 3.  Nonspecific soft tissue thickening along the right right aspect of the   scrotum, reportedly chronic. Assessment and Plan    ICD-10-CM    1. Left testicular pain  N50.812 AMB POC URINALYSIS DIP STICK AUTO W/O MICRO      2. Left inguinal hernia  K40.90 CT ABDOMEN PELVIS W IV CONTRAST Additional Contrast? Oral      3. Benign prostatic hyperplasia without lower urinary tract symptoms  N40.0 PSA, Diagnostic     PSA, Diagnostic      4. Erectile dysfunction due to arterial insufficiency  N52.01         L groin / testicle pain likely due to L inguinal hernia and NOT hydrocele in my opinion. I recommended repair with Dr. Santiago Larkin.  Will also get CT scan to evaluate pelvis further and rule out other explanation for his groin pain. Will call with results when available. Hydrocele on P/E is mUCH smaller than reported on US and I think US was actually visualizing the hernia. ED:   Discussed but patient not bothered by this and does not want treatment at this time    PSA Screening:   PSA today. Will call with results when availble. REBECCA WNL    I have spent 45 minutes today reviewing previous notes, test results and face to face with the patient as well as documenting. Ricky Santana M.D.     [facility]  [address]  Phone: [phone]  Fax: [phone]

## 2022-08-01 ENCOUNTER — HOSPITAL ENCOUNTER (OUTPATIENT)
Dept: CT IMAGING | Age: 77
Discharge: HOME | End: 2022-08-04
Payer: MEDICARE

## 2022-08-01 DIAGNOSIS — K40.90 LEFT INGUINAL HERNIA: ICD-10-CM

## 2022-08-01 LAB — CREAT BLD-MCNC: 1.22 MG/DL (ref 0.8–1.5)

## 2022-08-01 PROCEDURE — 6360000004 HC RX CONTRAST MEDICATION: Performed by: UROLOGY

## 2022-08-01 PROCEDURE — 82565 ASSAY OF CREATININE: CPT

## 2022-08-01 PROCEDURE — 74177 CT ABD & PELVIS W/CONTRAST: CPT

## 2022-08-01 RX ADMIN — IOPAMIDOL 100 ML: 755 INJECTION, SOLUTION INTRAVENOUS at 14:27

## 2022-08-01 RX ADMIN — DIATRIZOATE MEGLUMINE AND DIATRIZOATE SODIUM 15 ML: 660; 100 LIQUID ORAL; RECTAL at 14:29

## 2022-08-02 NOTE — RESULT ENCOUNTER NOTE
Mr. Kathy Carmona, your CT scan shows bilateral inguinal hernias. I think that these are the likely cause of your groin pain. I would recommend that you contact Dr. Rita Marti to discuss repair. Also, your prostate cancer blood test was normal which is great news. Please let me know if you have any questions/concerns.      Sujata Dugan,   Dr. Anton Fothergill

## 2022-08-11 ENCOUNTER — OFFICE VISIT (OUTPATIENT)
Dept: SURGERY | Age: 77
End: 2022-08-11
Payer: MEDICARE

## 2022-08-11 VITALS
WEIGHT: 231 LBS | HEART RATE: 70 BPM | DIASTOLIC BLOOD PRESSURE: 74 MMHG | BODY MASS INDEX: 33.07 KG/M2 | SYSTOLIC BLOOD PRESSURE: 132 MMHG | HEIGHT: 70 IN

## 2022-08-11 DIAGNOSIS — K40.20 NON-RECURRENT BILATERAL INGUINAL HERNIA WITHOUT OBSTRUCTION OR GANGRENE: ICD-10-CM

## 2022-08-11 DIAGNOSIS — K42.9 UMBILICAL HERNIA WITHOUT OBSTRUCTION AND WITHOUT GANGRENE: Primary | ICD-10-CM

## 2022-08-11 DIAGNOSIS — Z79.02 ANTIPLATELET OR ANTITHROMBOTIC LONG-TERM USE: ICD-10-CM

## 2022-08-11 PROCEDURE — 3017F COLORECTAL CA SCREEN DOC REV: CPT | Performed by: SURGERY

## 2022-08-11 PROCEDURE — 1123F ACP DISCUSS/DSCN MKR DOCD: CPT | Performed by: SURGERY

## 2022-08-11 PROCEDURE — G8427 DOCREV CUR MEDS BY ELIG CLIN: HCPCS | Performed by: SURGERY

## 2022-08-11 PROCEDURE — 1036F TOBACCO NON-USER: CPT | Performed by: SURGERY

## 2022-08-11 PROCEDURE — 99214 OFFICE O/P EST MOD 30 MIN: CPT | Performed by: SURGERY

## 2022-08-11 PROCEDURE — G8417 CALC BMI ABV UP PARAM F/U: HCPCS | Performed by: SURGERY

## 2022-08-11 ASSESSMENT — ENCOUNTER SYMPTOMS
GASTROINTESTINAL NEGATIVE: 1
ALLERGIC/IMMUNOLOGIC NEGATIVE: 1
EYES NEGATIVE: 1
RESPIRATORY NEGATIVE: 1

## 2022-08-11 NOTE — PROGRESS NOTES
Amelia De Anda MD, [facility], [address] [place] [address]  Alexa Mccartney  Phone [phone]   Fax [phone]      Date of visit: 8/14/2022     Primary/Requesting provider: Sarah Dutta MD    Chief Complaint   Patient presents with    Follow-up     CT results          Patient is a 78 y.o. male who presents for further discussion of his left groin; he was first seen 5/10/22. He was found to have bilateral inguinal hernias, and an umbilical hernia, but declined intervention at that time. He has subsequently continued to have left inguinal and left testicular pain. He has seen urology, due to the initial US report of a large hydrocele; urology also did not believe there was any hydrocele and suggested he followup with general surgery regarding the hernias. A CT was ordered to r/o additional pathology and he does not know the results. He continues to describe left groin crease pain, with swelling and bulging, but \"haven't noticed it too much. \"  He also occasionally mentions left testicular pain. He denies n/v, hematuria, or altered baseline bowel/bladder function. Medications:   Current Outpatient Medications on File Prior to Visit   Medication Sig Dispense Refill    aspirin 81 MG EC tablet Take 81 mg by mouth in the morning. APPLE CIDER VINEGAR PO Take by mouth      GARLIC PO Take by mouth      HAWTHORN BERRY PO Take by mouth      LECITHIN PO Take by mouth      alirocumab (PRALUENT) 75 MG/ML SOAJ injection pen Inject 75 mg into the skin      ascorbic acid (VITAMIN C) 500 MG tablet Take 1,000 mg by mouth      clopidogrel (PLAVIX) 75 MG tablet Take 75 mg by mouth in the morning. Coenzyme Q10 10 MG CAPS Take by mouth      cyanocobalamin 100 MCG tablet Take 400 mcg by mouth daily      furosemide (LASIX) 40 MG tablet Take 40 mg by mouth daily as needed      LORazepam (ATIVAN) 1 MG tablet Take 1 mg by mouth 2 times daily as needed.       losartan (COZAAR) 100 MG tablet Take 100 mg by mouth in the morning. metoprolol succinate (TOPROL XL) 25 MG extended release tablet Take 25 mg by mouth in the morning.      montelukast (SINGULAIR) 10 MG tablet TAKE 1 TABLET BY MOUTH EVERY DAY      nitroGLYCERIN (NITROSTAT) 0.4 MG SL tablet Place 0.4 mg under the tongue      omeprazole (PRILOSEC) 20 MG delayed release capsule Take 20 mg by mouth in the morning. PARoxetine (PAXIL) 20 MG tablet Take 20 mg by mouth every evening      potassium chloride (KLOR-CON M) 20 MEQ extended release tablet Take 20 mEq by mouth daily as needed      raNITIdine (ZANTAC) 150 MG tablet Take 150 mg by mouth in the morning. thiamine 100 MG tablet Take 400 mg by mouth daily      valsartan (DIOVAN) 80 MG tablet Take 80 mg by mouth in the morning. vitamin E 400 UNIT capsule Take by mouth daily      zaleplon (SONATA) 5 MG capsule Take 5 mg by mouth. No current facility-administered medications on file prior to visit. Allergies:    Allergies   Allergen Reactions    Indomethacin Shortness Of Breath     Severe dizziness    Atorvastatin Other (See Comments)    Rosuvastatin Other (See Comments)     CONFUSION        Past History:  Past Medical History:   Diagnosis Date    Asthma     child    CAD (coronary artery disease)     Cardiac arrhythmia     atrial fib    Chronic kidney disease     stage 2    GERD (gastroesophageal reflux disease)     Gout     HLD (hyperlipidemia)     Hypercholesterolemia     Hypertension     Liver disease     ?? growth on liver    Prostatism     PUD (peptic ulcer disease)     15 years    Stroke [facility])       Past Surgical History:   Procedure Laterality Date    CABG, ARTERIAL, THREE  4/13/11    x3    CARDIAC CATHETERIZATION      HEENT      1985 sinus surgery    ORTHOPEDIC SURGERY      right shoulder, left knee and left foot    OTHER SURGICAL HISTORY  04/2011    Quadruple bypass    CT CARDIAC SURG PROCEDURE UNLIST      CABG, 3 vessels, 4/13/2011        Family and Social History:  Family History   Problem Relation Age of Onset    Alzheimer's Disease Mother     Stroke Father     Heart Disease Father      Social History     Socioeconomic History    Marital status:      Spouse name: Not on file    Number of children: Not on file    Years of education: Not on file    Highest education level: Not on file   Occupational History    Not on file   Tobacco Use    Smoking status: Former     Packs/day: 1.50     Types: Cigarettes     Quit date: 1970     Years since quittin.2    Smokeless tobacco: Former     Quit date: 2018    Tobacco comments:     Quit smoking: chews tobacco   Substance and Sexual Activity    Alcohol use: Yes     Alcohol/week: 1.7 standard drinks    Drug use: No    Sexual activity: Not on file   Other Topics Concern    Not on file   Social History Narrative    Not on file     Social Determinants of Health     Financial Resource Strain: Not on file   Food Insecurity: Not on file   Transportation Needs: Not on file   Physical Activity: Not on file   Stress: Not on file   Social Connections: Not on file   Intimate Partner Violence: Not on file   Housing Stability: Not on file           Review of Systems   Constitutional: Negative. HENT: Negative. Eyes: Negative. Respiratory: Negative. Cardiovascular: Negative. Gastrointestinal: Negative. Endocrine: Negative. Genitourinary:  Positive for testicular pain. Musculoskeletal: Negative. Skin: Negative. Allergic/Immunologic: Negative. Neurological: Negative. Hematological: Negative. Psychiatric/Behavioral: Negative. Physical Exam  Vitals and nursing note reviewed. HENT:      Head: Normocephalic and atraumatic. Eyes:      General: No scleral icterus. Pupils: Pupils are equal, round, and reactive to light. Cardiovascular:      Rate and Rhythm: Normal rate. Pulmonary:      Effort: Pulmonary effort is normal. No respiratory distress. Breath sounds: No stridor. Abdominal:      General: Bowel sounds are normal.      Palpations: There is no mass. Tenderness: There is no abdominal tenderness. There is no guarding. Hernia: A hernia (small umbilical defect, reducible) is present. Hernia is present in the left inguinal area (moderate sized) and right inguinal area. Genitourinary:     Penis: Normal.       Testes: Normal.         Right: Mass or testicular hydrocele not present. Left: Mass or testicular hydrocele not present. Neurological:      General: No focal deficit present. Mental Status: He is alert and oriented to person, place, and time. Cranial Nerves: No cranial nerve deficit. Psychiatric:         Mood and Affect: Mood normal.         Behavior: Behavior normal.         Thought Content: Thought content normal.         Judgment: Judgment normal.       CT Result (most recent):  CT ABDOMEN PELVIS W IV CONTRAST 08/01/2022    Narrative  CT OF THE ABDOMEN AND PELVIS    INDICATION: Left groin pain    Multiple axial images were obtained through the abdomen and pelvis. Oral  contrast was used for bowel opacification. 100mL of Isovue 370 intravenous  contrast was used for better evaluation of solid organs and vascular structures. Radiation dose reduction techniques were used for this study. All CT scans  performed at this facility use one or all of the following: Automated exposure  control, adjustment of the mA and/or kVp according to patient's size, iterative  reconstruction. COMPARISON: 03/07/2019    FINDINGS:  - LUNG BASES: No infiltrates or masses. Post CABG changes. - LIVER: 4 cm left lobe hepatic cyst.  No developing liver mass. - GALLBLADDER/BILE DUCTS: No gallstones or bile duct dilatation.  - PANCREAS: Normal.  - SPLEEN: Normal.    - ADRENALS: Normal.  - KIDNEYS/URETERS: No hydronephrosis or significant mass. - BLADDER: Normal.  - REPRODUCTIVE ORGANS: No pelvic masses. - BOWEL: Normal caliber.   Mild sigmoid diverticulosis. No inflammatory changes. Appendix is normal in size.  - LYMPH NODES: No significant retroperitoneal, mesenteric, or pelvic adenopathy.  - BONES: No fracture or significant bone lesion.  - VASCULATURE: Moderate atherosclerosis.  - OTHER: Small bilateral inguinal hernias, no bowel involvement. Impression  1. Small bilateral inguinal hernias. 2.  No acute findings in abdomen/pelvis      If there are any questions about this report, I can be reached on [employer]  or at [phone]     ASSESSMENT and PLAN:    1. Umbilical hernia without obstruction and without gangrene    2. Non-recurrent bilateral inguinal hernia without obstruction or gangrene    3. Antiplatelet or antithrombotic long-term use       CT images reviewed- bilateral fat-containing inguinal hernias; no hydrocele. Symptoms warrant repair. He is counseled that true testicular pain is not likely to be resolved by hernia repair; he seems to be using \"groin\" and \"testicular\" pain interchangeably but is counseled that they are distinct anatomic structures. Will proceed with umbilical and laparoscopic bilateral inguinal hernia repair with mesh. Technical details of the procedure are reviewed. Risks reviewed include risks of anesthesia, bleeding, infection, visceral injury, persistent post-operative pain, and hernia recurrence. All questions are answered.    HOLD PLAVIX

## 2022-09-08 ENCOUNTER — TELEPHONE (OUTPATIENT)
Dept: SURGERY | Age: 77
End: 2022-09-08

## 2022-09-08 ENCOUNTER — PREP FOR PROCEDURE (OUTPATIENT)
Dept: SURGERY | Age: 77
End: 2022-09-08

## 2022-09-08 PROBLEM — K42.9 UMBILICAL HERNIA WITHOUT OBSTRUCTION AND WITHOUT GANGRENE: Status: ACTIVE | Noted: 2022-09-08

## 2022-09-08 PROBLEM — K40.20 NON-RECURRENT BILATERAL INGUINAL HERNIA WITHOUT OBSTRUCTION OR GANGRENE: Status: ACTIVE | Noted: 2022-09-08

## 2022-09-08 PROBLEM — Z79.02 ANTIPLATELET OR ANTITHROMBOTIC LONG-TERM USE: Status: ACTIVE | Noted: 2022-09-08

## 2022-09-08 NOTE — TELEPHONE ENCOUNTER
Patient stated that he will call back to schedule his surgery. Umbilical & Laparoscopic Bilateral Inguinal Hernia w/ Mesh. Patient stated that he will be out of town ( in [place] ).

## 2022-09-13 NOTE — PERIOP NOTE
Dear Mateo Chairez,      Thank you for completing your phone assessment with me today. Here are your requested surgery instructions. Please call #[phone] with any questions/concerns. Pre-Assessment appointment on 9/14/22 at 12:00 pm for EKG and Labs. Please bring an updated med list or prescription bottles to this appointment for a nurse to review. Medication instructions will be given after medications are updated. Your surgery is scheduled at [address] [place] [address] ([facility] with green roof). Please arrive at Entrance A OUTPATIENT SURGERY (next door to the ER); pre-op (#[phone]) will call you on the business day before your surgery with your arrival time. If you have any questions on the day of surgery, please call the pre-op dept. at the telephone number above. Nothing to eat after midnight which includes any gum, mints, candy, or ice chips. On the day before surgery (9/14/22) take Acetaminophen 1000mg in the morning and at bedtime OR Acetaminophen 650mg in the morning, afternoon and bedtime. Please stop all vitamins/supplements 7 days prior to surgery and stop all NSAIDS (ibuprofen, naproxen, aleve, motrin, advil) 5 days before your surgery. Please continue 81 mg Aspirin. A responsible adult must drive you to the hospital, remain in the building during surgery and you will need adult supervision for 24 hours after anesthesia. Please use an antibacterial soap (Dial, Safeguard, etc.) the night before surgery and on the morning of surgery unless you have been otherwise instructed. Do NOT wear deodorant, make-up, nail polish, lotions, cologne, perfumes, powders or oil on your skin. All piercings/metal/jewelry must be removed prior to arrival.  If you wear contacts then you will need to bring a case to store them in or wear your glasses.      Please leave all your valuables at home but be sure to bring your insurance card and ID on the day of surgery for registration/identification. Our Guide to Surgery with additional information can be found:  [email or web address]

## 2022-09-13 NOTE — PERIOP NOTE
Patient verified name and . Order for consent NOT found in EHR; patient verifies procedure. Type 2 surgery, phone assessment complete. Orders NOT received. Labs per surgeon: unknown; no orders received in EHR at time of appointment. Labs per anesthesia protocol: HGB, K+, Creatinine-to be collected on 22. EKG: to be completed on 22. PAT appointment made. Instructed patient to come to [address] ([address]) at 12:00 pm on 22 for EKG and Labs. Patient also to obtain written med instructions once meds are reviewed by the nurse. Patient followed by [facility]. Office note (22), Stress test (21), Heart cath (19), EKG (21), and Echo (18) available in EHR for reference. Patient answered medical/surgical history questions at their best of ability. Patient unable to review home medications. Instructed patient to bring an updated list and or his original bottles to his PAT appointment tomorrow. Patient to obtain written medication instructions on 22. On the day before surgery please take Acetaminophen 1000mg in the morning and then again before bed. You may substitute for Tylenol 650 mg. Hold all vitamins 7 days prior to surgery and NSAIDS 5 days prior to surgery.      Patient instructed on the following:    > Arrive at A Entrance, time of arrival to be called the day before by 1700  > NPO after midnight, unless otherwise indicated, including gum, mints, and ice chips  > Responsible adult must drive patient to the hospital, stay during surgery, and patient will need supervision 24 hours after anesthesia  > Use dial antibacterial soap in shower the night before surgery and on the morning of surgery  > All piercings must be removed prior to arrival.    > Leave all valuables (money and jewelry) at home but bring insurance card and ID on DOS.   > You may be required to pay a deductible or co-pay on the day of your procedure. You can pre-pay by calling [phone] if your surgery is at the [facility] or [phone] if your surgery is at the [facility]. > Do not wear make-up, nail polish, lotions, cologne, perfumes, powders, or oil on skin. Artificial nails are not permitted.

## 2022-09-14 ENCOUNTER — HOSPITAL ENCOUNTER (OUTPATIENT)
Dept: SURGERY | Age: 77
Discharge: HOME | End: 2022-09-17
Payer: MEDICARE

## 2022-09-14 LAB
CREAT SERPL-MCNC: 1.24 MG/DL (ref 0.8–1.5)
EKG ATRIAL RATE: 73 BPM
EKG DIAGNOSIS: NORMAL
EKG P AXIS: 6 DEGREES
EKG P-R INTERVAL: 158 MS
EKG Q-T INTERVAL: 436 MS
EKG QRS DURATION: 140 MS
EKG QTC CALCULATION (BAZETT): 480 MS
EKG R AXIS: -74 DEGREES
EKG T AXIS: 42 DEGREES
EKG VENTRICULAR RATE: 73 BPM
HGB BLD-MCNC: 15.5 G/DL (ref 13.6–17.2)
POTASSIUM SERPL-SCNC: 3.9 MMOL/L (ref 3.5–5.1)

## 2022-09-14 PROCEDURE — 93005 ELECTROCARDIOGRAM TRACING: CPT | Performed by: ANESTHESIOLOGY

## 2022-09-14 PROCEDURE — 84132 ASSAY OF SERUM POTASSIUM: CPT

## 2022-09-14 PROCEDURE — 82565 ASSAY OF CREATININE: CPT

## 2022-09-14 PROCEDURE — 85018 HEMOGLOBIN: CPT

## 2022-09-14 NOTE — PERIOP NOTE
Dr. Janice Burks reviewed EKG's dated 9/14/22, 9/23/21, cardiology office note dated 5/12/22 and stress dated 11/3/21. Pt ok'd for surgery.

## 2022-09-14 NOTE — PERIOP NOTE
EKG done today- abnormal; will have anesthesia review. Medications reviewed with wife and updated in chart. Instructed to take aspirin 81 mg and ativan if needed DOS. Pt instructed to stop vitamins and supplements now. Hgb, K+ and CRT results pending.

## 2022-09-15 ENCOUNTER — ANESTHESIA EVENT (OUTPATIENT)
Dept: SURGERY | Age: 77
DRG: 352 | End: 2022-09-15
Payer: MEDICARE

## 2022-09-15 ENCOUNTER — HOSPITAL ENCOUNTER (OUTPATIENT)
Age: 77
Setting detail: OUTPATIENT SURGERY
Discharge: HOME | DRG: 352 | End: 2022-09-15
Attending: SURGERY | Admitting: SURGERY
Payer: MEDICARE

## 2022-09-15 ENCOUNTER — ANESTHESIA (OUTPATIENT)
Dept: SURGERY | Age: 77
DRG: 352 | End: 2022-09-15
Payer: MEDICARE

## 2022-09-15 VITALS
SYSTOLIC BLOOD PRESSURE: 120 MMHG | RESPIRATION RATE: 16 BRPM | HEART RATE: 71 BPM | HEIGHT: 70 IN | DIASTOLIC BLOOD PRESSURE: 59 MMHG | WEIGHT: 232.5 LBS | OXYGEN SATURATION: 91 % | TEMPERATURE: 97.9 F | BODY MASS INDEX: 33.28 KG/M2

## 2022-09-15 DIAGNOSIS — K40.20 NON-RECURRENT BILATERAL INGUINAL HERNIA WITHOUT OBSTRUCTION OR GANGRENE: ICD-10-CM

## 2022-09-15 DIAGNOSIS — Z79.02 ANTIPLATELET OR ANTITHROMBOTIC LONG-TERM USE: ICD-10-CM

## 2022-09-15 DIAGNOSIS — K42.9 UMBILICAL HERNIA WITHOUT OBSTRUCTION AND WITHOUT GANGRENE: ICD-10-CM

## 2022-09-15 PROCEDURE — 49585 REPAIR UMBILICAL HERN,5+Y/O,REDUC: CPT | Performed by: SURGERY

## 2022-09-15 PROCEDURE — 2500000003 HC RX 250 WO HCPCS: Performed by: NURSE ANESTHETIST, CERTIFIED REGISTERED

## 2022-09-15 PROCEDURE — 2500000003 HC RX 250 WO HCPCS: Performed by: SURGERY

## 2022-09-15 PROCEDURE — 6360000002 HC RX W HCPCS

## 2022-09-15 PROCEDURE — C1781 MESH (IMPLANTABLE): HCPCS | Performed by: SURGERY

## 2022-09-15 PROCEDURE — 7100000010 HC PHASE II RECOVERY - FIRST 15 MIN: Performed by: SURGERY

## 2022-09-15 PROCEDURE — 7100000011 HC PHASE II RECOVERY - ADDTL 15 MIN: Performed by: SURGERY

## 2022-09-15 PROCEDURE — 3600000014 HC SURGERY LEVEL 4 ADDTL 15MIN: Performed by: SURGERY

## 2022-09-15 PROCEDURE — 0WUF0JZ SUPPLEMENT ABDOMINAL WALL WITH SYNTHETIC SUBSTITUTE, OPEN APPROACH: ICD-10-PCS | Performed by: SURGERY

## 2022-09-15 PROCEDURE — 3600000004 HC SURGERY LEVEL 4 BASE: Performed by: SURGERY

## 2022-09-15 PROCEDURE — 3700000001 HC ADD 15 MINUTES (ANESTHESIA): Performed by: SURGERY

## 2022-09-15 PROCEDURE — 2720000010 HC SURG SUPPLY STERILE: Performed by: SURGERY

## 2022-09-15 PROCEDURE — C1727 CATH, BAL TIS DIS, NON-VAS: HCPCS | Performed by: SURGERY

## 2022-09-15 PROCEDURE — 2709999900 HC NON-CHARGEABLE SUPPLY: Performed by: SURGERY

## 2022-09-15 PROCEDURE — 2500000003 HC RX 250 WO HCPCS

## 2022-09-15 PROCEDURE — 6360000002 HC RX W HCPCS: Performed by: ANESTHESIOLOGY

## 2022-09-15 PROCEDURE — 2580000003 HC RX 258: Performed by: ANESTHESIOLOGY

## 2022-09-15 PROCEDURE — 7100000000 HC PACU RECOVERY - FIRST 15 MIN: Performed by: SURGERY

## 2022-09-15 PROCEDURE — 7100000001 HC PACU RECOVERY - ADDTL 15 MIN: Performed by: SURGERY

## 2022-09-15 PROCEDURE — 3700000000 HC ANESTHESIA ATTENDED CARE: Performed by: SURGERY

## 2022-09-15 PROCEDURE — 6370000000 HC RX 637 (ALT 250 FOR IP): Performed by: ANESTHESIOLOGY

## 2022-09-15 PROCEDURE — 0YUA4JZ SUPPLEMENT BILATERAL INGUINAL REGION WITH SYNTHETIC SUBSTITUTE, PERCUTANEOUS ENDOSCOPIC APPROACH: ICD-10-PCS | Performed by: SURGERY

## 2022-09-15 PROCEDURE — 49650 LAP ING HERNIA REPAIR INIT: CPT | Performed by: SURGERY

## 2022-09-15 PROCEDURE — 6360000002 HC RX W HCPCS: Performed by: NURSE ANESTHETIST, CERTIFIED REGISTERED

## 2022-09-15 DEVICE — MESH HERN M W8.5XL13.7CM L INGUINAL WHT POLYPR MFIL: Type: IMPLANTABLE DEVICE | Site: ABDOMEN | Status: FUNCTIONAL

## 2022-09-15 DEVICE — MESH HERN M DIA6.4CM VENTRAL POLYPR EPTFE CIR SELF EXP PTCH: Type: IMPLANTABLE DEVICE | Site: ABDOMEN | Status: FUNCTIONAL

## 2022-09-15 DEVICE — MESH HERN L W10.8XL16CM R INGUINAL WHT POLYPR MFIL: Type: IMPLANTABLE DEVICE | Site: ABDOMEN | Status: FUNCTIONAL

## 2022-09-15 RX ORDER — NEOSTIGMINE METHYLSULFATE 1 MG/ML
INJECTION, SOLUTION INTRAVENOUS PRN
Status: DISCONTINUED | OUTPATIENT
Start: 2022-09-15 | End: 2022-09-15 | Stop reason: SDUPTHER

## 2022-09-15 RX ORDER — LIDOCAINE HYDROCHLORIDE 10 MG/ML
1 INJECTION, SOLUTION INFILTRATION; PERINEURAL
Status: DISCONTINUED | OUTPATIENT
Start: 2022-09-15 | End: 2022-09-15 | Stop reason: HOSPADM

## 2022-09-15 RX ORDER — ROCURONIUM BROMIDE 10 MG/ML
INJECTION, SOLUTION INTRAVENOUS PRN
Status: DISCONTINUED | OUTPATIENT
Start: 2022-09-15 | End: 2022-09-15 | Stop reason: SDUPTHER

## 2022-09-15 RX ORDER — PROPOFOL 10 MG/ML
INJECTION, EMULSION INTRAVENOUS PRN
Status: DISCONTINUED | OUTPATIENT
Start: 2022-09-15 | End: 2022-09-15 | Stop reason: SDUPTHER

## 2022-09-15 RX ORDER — LIDOCAINE HYDROCHLORIDE 20 MG/ML
INJECTION, SOLUTION EPIDURAL; INFILTRATION; INTRACAUDAL; PERINEURAL PRN
Status: DISCONTINUED | OUTPATIENT
Start: 2022-09-15 | End: 2022-09-15 | Stop reason: SDUPTHER

## 2022-09-15 RX ORDER — SODIUM CHLORIDE, SODIUM LACTATE, POTASSIUM CHLORIDE, CALCIUM CHLORIDE 600; 310; 30; 20 MG/100ML; MG/100ML; MG/100ML; MG/100ML
INJECTION, SOLUTION INTRAVENOUS CONTINUOUS
Status: DISCONTINUED | OUTPATIENT
Start: 2022-09-15 | End: 2022-09-15 | Stop reason: HOSPADM

## 2022-09-15 RX ORDER — HYDROCODONE BITARTRATE AND ACETAMINOPHEN 5; 325 MG/1; MG/1
1 TABLET ORAL EVERY 4 HOURS PRN
Qty: 20 TABLET | Refills: 0 | Status: ON HOLD | OUTPATIENT
Start: 2022-09-15 | End: 2022-09-23 | Stop reason: HOSPADM

## 2022-09-15 RX ORDER — HYDROMORPHONE HYDROCHLORIDE 1 MG/ML
0.25 INJECTION, SOLUTION INTRAMUSCULAR; INTRAVENOUS; SUBCUTANEOUS EVERY 5 MIN PRN
Status: DISCONTINUED | OUTPATIENT
Start: 2022-09-15 | End: 2022-09-15 | Stop reason: HOSPADM

## 2022-09-15 RX ORDER — HYDROMORPHONE HYDROCHLORIDE 1 MG/ML
0.5 INJECTION, SOLUTION INTRAMUSCULAR; INTRAVENOUS; SUBCUTANEOUS
Status: DISCONTINUED | OUTPATIENT
Start: 2022-09-15 | End: 2022-09-15 | Stop reason: HOSPADM

## 2022-09-15 RX ORDER — EPHEDRINE SULFATE/0.9% NACL/PF 50 MG/5 ML
SYRINGE (ML) INTRAVENOUS PRN
Status: DISCONTINUED | OUTPATIENT
Start: 2022-09-15 | End: 2022-09-15 | Stop reason: SDUPTHER

## 2022-09-15 RX ORDER — ONDANSETRON 2 MG/ML
INJECTION INTRAMUSCULAR; INTRAVENOUS PRN
Status: DISCONTINUED | OUTPATIENT
Start: 2022-09-15 | End: 2022-09-15 | Stop reason: SDUPTHER

## 2022-09-15 RX ORDER — GLYCOPYRROLATE 0.2 MG/ML
INJECTION INTRAMUSCULAR; INTRAVENOUS PRN
Status: DISCONTINUED | OUTPATIENT
Start: 2022-09-15 | End: 2022-09-15 | Stop reason: SDUPTHER

## 2022-09-15 RX ORDER — OXYCODONE HYDROCHLORIDE 5 MG/1
5 TABLET ORAL PRN
Status: COMPLETED | OUTPATIENT
Start: 2022-09-15 | End: 2022-09-15

## 2022-09-15 RX ORDER — FENTANYL CITRATE 50 UG/ML
INJECTION, SOLUTION INTRAMUSCULAR; INTRAVENOUS PRN
Status: DISCONTINUED | OUTPATIENT
Start: 2022-09-15 | End: 2022-09-15 | Stop reason: SDUPTHER

## 2022-09-15 RX ORDER — ACETAMINOPHEN 500 MG
1000 TABLET ORAL ONCE
Status: COMPLETED | OUTPATIENT
Start: 2022-09-15 | End: 2022-09-15

## 2022-09-15 RX ORDER — OXYCODONE HYDROCHLORIDE 5 MG/1
10 TABLET ORAL PRN
Status: COMPLETED | OUTPATIENT
Start: 2022-09-15 | End: 2022-09-15

## 2022-09-15 RX ORDER — ASPIRIN 81 MG/1
81 TABLET, CHEWABLE ORAL ONCE
Status: COMPLETED | OUTPATIENT
Start: 2022-09-15 | End: 2022-09-15

## 2022-09-15 RX ORDER — SODIUM CHLORIDE 0.9 % (FLUSH) 0.9 %
5-40 SYRINGE (ML) INJECTION PRN
Status: DISCONTINUED | OUTPATIENT
Start: 2022-09-15 | End: 2022-09-15 | Stop reason: HOSPADM

## 2022-09-15 RX ORDER — ONDANSETRON 2 MG/ML
4 INJECTION INTRAMUSCULAR; INTRAVENOUS
Status: DISCONTINUED | OUTPATIENT
Start: 2022-09-15 | End: 2022-09-15 | Stop reason: HOSPADM

## 2022-09-15 RX ORDER — SODIUM CHLORIDE 9 MG/ML
INJECTION, SOLUTION INTRAVENOUS PRN
Status: DISCONTINUED | OUTPATIENT
Start: 2022-09-15 | End: 2022-09-15 | Stop reason: HOSPADM

## 2022-09-15 RX ORDER — MIDAZOLAM HYDROCHLORIDE 2 MG/2ML
2 INJECTION, SOLUTION INTRAMUSCULAR; INTRAVENOUS
Status: COMPLETED | OUTPATIENT
Start: 2022-09-15 | End: 2022-09-15

## 2022-09-15 RX ORDER — SODIUM CHLORIDE 0.9 % (FLUSH) 0.9 %
5-40 SYRINGE (ML) INJECTION EVERY 12 HOURS SCHEDULED
Status: DISCONTINUED | OUTPATIENT
Start: 2022-09-15 | End: 2022-09-15 | Stop reason: HOSPADM

## 2022-09-15 RX ORDER — BUPIVACAINE HYDROCHLORIDE AND EPINEPHRINE 5; 5 MG/ML; UG/ML
INJECTION, SOLUTION EPIDURAL; INTRACAUDAL; PERINEURAL PRN
Status: DISCONTINUED | OUTPATIENT
Start: 2022-09-15 | End: 2022-09-15 | Stop reason: ALTCHOICE

## 2022-09-15 RX ORDER — DEXAMETHASONE SODIUM PHOSPHATE 10 MG/ML
INJECTION INTRAMUSCULAR; INTRAVENOUS PRN
Status: DISCONTINUED | OUTPATIENT
Start: 2022-09-15 | End: 2022-09-15 | Stop reason: SDUPTHER

## 2022-09-15 RX ORDER — HYDROMORPHONE HYDROCHLORIDE 1 MG/ML
0.25 INJECTION, SOLUTION INTRAMUSCULAR; INTRAVENOUS; SUBCUTANEOUS
Status: DISCONTINUED | OUTPATIENT
Start: 2022-09-15 | End: 2022-09-15 | Stop reason: HOSPADM

## 2022-09-15 RX ORDER — HYDROMORPHONE HYDROCHLORIDE 1 MG/ML
0.5 INJECTION, SOLUTION INTRAMUSCULAR; INTRAVENOUS; SUBCUTANEOUS EVERY 5 MIN PRN
Status: DISCONTINUED | OUTPATIENT
Start: 2022-09-15 | End: 2022-09-15 | Stop reason: HOSPADM

## 2022-09-15 RX ORDER — DEXTROSE MONOHYDRATE 100 MG/ML
INJECTION, SOLUTION INTRAVENOUS CONTINUOUS PRN
Status: DISCONTINUED | OUTPATIENT
Start: 2022-09-15 | End: 2022-09-15 | Stop reason: HOSPADM

## 2022-09-15 RX ORDER — LABETALOL HYDROCHLORIDE 5 MG/ML
10 INJECTION, SOLUTION INTRAVENOUS
Status: DISCONTINUED | OUTPATIENT
Start: 2022-09-15 | End: 2022-09-15 | Stop reason: HOSPADM

## 2022-09-15 RX ADMIN — Medication 10 MG: at 13:19

## 2022-09-15 RX ADMIN — MIDAZOLAM HYDROCHLORIDE 2 MG: 1 INJECTION, SOLUTION INTRAMUSCULAR; INTRAVENOUS at 12:06

## 2022-09-15 RX ADMIN — LIDOCAINE HYDROCHLORIDE 60 MG: 20 INJECTION, SOLUTION EPIDURAL; INFILTRATION; INTRACAUDAL; PERINEURAL at 13:05

## 2022-09-15 RX ADMIN — OXYCODONE 5 MG: 5 TABLET ORAL at 15:43

## 2022-09-15 RX ADMIN — SODIUM CHLORIDE, SODIUM LACTATE, POTASSIUM CHLORIDE, AND CALCIUM CHLORIDE: 600; 310; 30; 20 INJECTION, SOLUTION INTRAVENOUS at 11:58

## 2022-09-15 RX ADMIN — FENTANYL CITRATE 100 MCG: 50 INJECTION, SOLUTION INTRAMUSCULAR; INTRAVENOUS at 13:05

## 2022-09-15 RX ADMIN — SODIUM CHLORIDE, SODIUM LACTATE, POTASSIUM CHLORIDE, AND CALCIUM CHLORIDE: 600; 310; 30; 20 INJECTION, SOLUTION INTRAVENOUS at 13:37

## 2022-09-15 RX ADMIN — ASPIRIN 81 MG: 81 TABLET, CHEWABLE ORAL at 11:48

## 2022-09-15 RX ADMIN — ROCURONIUM BROMIDE 50 MG: 50 INJECTION, SOLUTION INTRAVENOUS at 13:05

## 2022-09-15 RX ADMIN — ROCURONIUM BROMIDE 10 MG: 50 INJECTION, SOLUTION INTRAVENOUS at 14:18

## 2022-09-15 RX ADMIN — ROCURONIUM BROMIDE 10 MG: 50 INJECTION, SOLUTION INTRAVENOUS at 13:41

## 2022-09-15 RX ADMIN — ONDANSETRON 4 MG: 2 INJECTION INTRAMUSCULAR; INTRAVENOUS at 13:21

## 2022-09-15 RX ADMIN — NEOSTIGMINE METHYLSULFATE 3 MG: 1 INJECTION, SOLUTION INTRAVENOUS at 14:46

## 2022-09-15 RX ADMIN — Medication 10 MG: at 14:36

## 2022-09-15 RX ADMIN — GLYCOPYRROLATE 0.4 MG: 0.2 INJECTION, SOLUTION INTRAMUSCULAR; INTRAVENOUS at 14:46

## 2022-09-15 RX ADMIN — PROPOFOL 70 MG: 10 INJECTION, EMULSION INTRAVENOUS at 14:51

## 2022-09-15 RX ADMIN — DEXAMETHASONE SODIUM PHOSPHATE 5 MG: 10 INJECTION INTRAMUSCULAR; INTRAVENOUS at 13:21

## 2022-09-15 RX ADMIN — ACETAMINOPHEN 1000 MG: 500 TABLET, FILM COATED ORAL at 11:48

## 2022-09-15 RX ADMIN — Medication 10 MG: at 13:51

## 2022-09-15 RX ADMIN — Medication 2 G: at 13:18

## 2022-09-15 RX ADMIN — Medication 10 MG: at 13:48

## 2022-09-15 RX ADMIN — PROPOFOL 200 MG: 10 INJECTION, EMULSION INTRAVENOUS at 13:05

## 2022-09-15 RX ADMIN — Medication 10 MG: at 13:17

## 2022-09-15 ASSESSMENT — LIFESTYLE VARIABLES: SMOKING_STATUS: 1

## 2022-09-15 ASSESSMENT — PAIN SCALES - GENERAL
PAINLEVEL_OUTOF10: 2
PAINLEVEL_OUTOF10: 5

## 2022-09-15 ASSESSMENT — PAIN - FUNCTIONAL ASSESSMENT: PAIN_FUNCTIONAL_ASSESSMENT: 0-10

## 2022-09-15 ASSESSMENT — PAIN DESCRIPTION - ORIENTATION: ORIENTATION: MID

## 2022-09-15 ASSESSMENT — PAIN DESCRIPTION - LOCATION: LOCATION: ABDOMEN

## 2022-09-15 ASSESSMENT — PAIN DESCRIPTION - DESCRIPTORS: DESCRIPTORS: ACHING;BURNING

## 2022-09-15 NOTE — ANESTHESIA PRE PROCEDURE
tablet Place 0.4 mg under the tongue 9/23/21   Ar Automatic Reconciliation   omeprazole (PRILOSEC) 20 MG delayed release capsule Take 20 mg by mouth in the morning. 12/28/20   Ar Automatic Reconciliation   PARoxetine (PAXIL) 20 MG tablet Take 30 mg by mouth every evening 7/29/19   Ar Automatic Reconciliation   potassium chloride (KLOR-CON M) 20 MEQ extended release tablet Take 20 mEq by mouth daily as needed 7/23/20   Ar Automatic Reconciliation   raNITIdine (ZANTAC) 150 MG tablet Take 150 mg by mouth in the morning. Ar Automatic Reconciliation   thiamine 100 MG tablet Take 100 mg by mouth daily    Ar Automatic Reconciliation   valsartan (DIOVAN) 80 MG tablet Take 80 mg by mouth in the morning. Ar Automatic Reconciliation   vitamin E 400 UNIT capsule Take by mouth daily    Ar Automatic Reconciliation   zaleplon (SONATA) 5 MG capsule Take 5 mg by mouth nightly as needed. 11/13/20   Ar Automatic Reconciliation       Current medications:    Current Facility-Administered Medications   Medication Dose Route Frequency Provider Last Rate Last Admin    lidocaine 1 % injection 1 mL  1 mL IntraDERmal Once PRN Brent Bello MD        acetaminophen (TYLENOL) tablet 1,000 mg  1,000 mg Oral Once Brent Bello MD        HYDROmorphone HCl PF (DILAUDID) injection 0.25 mg  0.25 mg IntraVENous Once PRN Brent Bello MD        Or    HYDROmorphone HCl PF (DILAUDID) injection 0.5 mg  0.5 mg IntraVENous Once PRN Brent Bello MD        lactated ringers infusion   IntraVENous Continuous Brent Bello MD        sodium chloride flush 0.9 % injection 5-40 mL  5-40 mL IntraVENous 2 times per day Brent Bello MD        sodium chloride flush 0.9 % injection 5-40 mL  5-40 mL IntraVENous PRN Brent Bello MD        0.9 % sodium chloride infusion   IntraVENous PRN Brent Bello MD        midazolam PF (VERSED) injection 2 mg  2 mg IntraVENous Once PRN Brent Bello MD           Allergies:     Allergies Allergen Reactions    Indomethacin Shortness Of Breath     Severe dizziness    Atorvastatin Other (See Comments)    Rosuvastatin Other (See Comments)     CONFUSION       Problem List:    Patient Active Problem List   Diagnosis Code    HTN (hypertension) I10    Paroxysmal atrial fibrillation (HCC) I48.0    Postoperative anemia due to acute blood loss D62    Abnormal nuclear stress test R94.39    CAD (coronary artery disease) I25.10    GERD (gastroesophageal reflux disease) K21.9    Calcification of abdominal aorta (HCC) I70.0    Anemia D64.9    Postsurgical aortocoronary bypass status Z95.1    Obesity, unspecified E66.9    Tobacco use disorder F17.200    S/P PTCA (percutaneous transluminal coronary angioplasty) Z98.61    S/P CABG (coronary artery bypass graft) Z95.1    Dyslipidemia E78.5    Coronary atherosclerosis of native coronary vessel I25.10    NEWSOME (dyspnea on exertion) R06.00    Mild episode of recurrent major depressive disorder (HCC) L75.8    Umbilical hernia without obstruction and without gangrene K42.9    Non-recurrent bilateral inguinal hernia without obstruction or gangrene K40.20    Antiplatelet or antithrombotic long-term use Z79.02       Past Medical History:        Diagnosis Date    Anemia     Anxiety     Asthma     childhood    BMI 33.0-33.9,adult     CAD (coronary artery disease)     CABG/Stents; denies MI; [facility]    Calcification of abdominal aorta (HCC)     Cardiac arrhythmia     atrial fib    Chronic kidney disease     Dyslipidemia     Former smoker     GERD (gastroesophageal reflux disease)     Gout     HLD (hyperlipidemia)     Hypercholesterolemia     Hypertension     Insomnia     Liver disease     ?? growth on liver    Mild episode of recurrent major depressive disorder (HCC)     HAY (obstructive sleep apnea)     non-compliant with c-pap    Paroxysmal atrial fibrillation (HCC)     Poor historian     Prostatism     PUD Date/Time     07/07/2021 04:00 PM    K 3.9 09/14/2022 12:21 PM     07/07/2021 04:00 PM    CO2 27 07/07/2021 04:00 PM    BUN 19 07/07/2021 04:00 PM    CREATININE 1.24 09/14/2022 12:21 PM    GFRAA >60 07/07/2021 04:00 PM    AGRATIO 1.3 07/07/2021 04:00 PM    GLUCOSE 81 07/07/2021 04:00 PM    PROT 6.8 07/07/2021 04:00 PM    CALCIUM 9.2 07/07/2021 04:00 PM    CALCIUM 9.0 07/07/2021 04:00 PM    BILITOT 0.6 07/07/2021 04:00 PM    ALKPHOS 53 07/07/2021 04:00 PM    AST 16 07/07/2021 04:00 PM    ALT 31 07/07/2021 04:00 PM       POC Tests: No results for input(s): POCGLU, POCNA, POCK, POCCL, POCBUN, POCHEMO, POCHCT in the last 72 hours. Coags:   Lab Results   Component Value Date/Time    PROTIME 12.6 09/16/2019 07:42 AM    INR 0.9 09/16/2019 07:42 AM       HCG (If Applicable): No results found for: PREGTESTUR, PREGSERUM, HCG, HCGQUANT     ABGs: No results found for: PHART, PO2ART, IXA9ZBV, WHR1IZJ, BEART, E9LVLZSP     Type & Screen (If Applicable):  No results found for: LABABO, LABRH    Drug/Infectious Status (If Applicable):  No results found for: HIV, HEPCAB    COVID-19 Screening (If Applicable): No results found for: COVID19        Anesthesia Evaluation    Airway: Mallampati: III  TM distance: >3 FB   Neck ROM: full  Mouth opening: < 3 FB   Dental: normal exam   (+) bridge and caps  Comment: crowns    Pulmonary:normal exam  breath sounds clear to auscultation  (+) sleep apnea: on noncompliant,  current smoker (former)                           Cardiovascular:  Exercise tolerance: good (>4 METS), Ambulates well unassisted  (+) hypertension:, CAD:, CABG/stent (stents (last 2019), CABG 2011):, dysrhythmias: atrial fibrillation,         Rhythm: regular  Rate: normal                 ROS comment: Stress test 11/21:  1. Stress EKG: Normal.  Baseline RBBB. 2. SPECT Perfusion Imaging: Normal Perfusion.    3. LV Systolic Function is normal.   4. Risk Assessment: Normal.    Unremarkable echo 2018       Neuro/Psych: (+) depression/anxiety             GI/Hepatic/Renal:   (+) GERD:, renal disease: CRI,           Endo/Other:                     Abdominal:             Vascular: Other Findings:           Anesthesia Plan      general     ASA 3     (Optimized at CV visit 5/22. Glidescope for intubation.)  Induction: intravenous. Anesthetic plan and risks discussed with patient and spouse.                         Bam Seay MD   9/15/2022

## 2022-09-15 NOTE — H&P
Primary/Requesting provider: Vinh Higgins MD          Chief Complaint   Patient presents with    Follow-up       CT results           Patient is a 78 y.o. male who presents for further discussion of his left groin; he was first seen 5/10/22. He was found to have bilateral inguinal hernias, and an umbilical hernia, but declined intervention at that time. He has subsequently continued to have left inguinal and left testicular pain. He has seen urology, due to the initial US report of a large hydrocele; urology also did not believe there was any hydrocele and suggested he followup with general surgery regarding the hernias. A CT was ordered to r/o additional pathology and he does not know the results. He continues to describe left groin crease pain, with swelling and bulging, but \"haven't noticed it too much. \"  He also occasionally mentions left testicular pain. He denies n/v, hematuria, or altered baseline bowel/bladder function. Medications:          Current Outpatient Medications on File Prior to Visit   Medication Sig Dispense Refill    aspirin 81 MG EC tablet Take 81 mg by mouth in the morning. APPLE CIDER VINEGAR PO Take by mouth        GARLIC PO Take by mouth        HAWTHORN BERRY PO Take by mouth        LECITHIN PO Take by mouth        alirocumab (PRALUENT) 75 MG/ML SOAJ injection pen Inject 75 mg into the skin        ascorbic acid (VITAMIN C) 500 MG tablet Take 1,000 mg by mouth        clopidogrel (PLAVIX) 75 MG tablet Take 75 mg by mouth in the morning. Coenzyme Q10 10 MG CAPS Take by mouth        cyanocobalamin 100 MCG tablet Take 400 mcg by mouth daily        furosemide (LASIX) 40 MG tablet Take 40 mg by mouth daily as needed        LORazepam (ATIVAN) 1 MG tablet Take 1 mg by mouth 2 times daily as needed. losartan (COZAAR) 100 MG tablet Take 100 mg by mouth in the morning.         metoprolol succinate (TOPROL XL) 25 MG extended release tablet Take 25 mg by mouth in the morning.        montelukast (SINGULAIR) 10 MG tablet TAKE 1 TABLET BY MOUTH EVERY DAY        nitroGLYCERIN (NITROSTAT) 0.4 MG SL tablet Place 0.4 mg under the tongue        omeprazole (PRILOSEC) 20 MG delayed release capsule Take 20 mg by mouth in the morning. PARoxetine (PAXIL) 20 MG tablet Take 20 mg by mouth every evening        potassium chloride (KLOR-CON M) 20 MEQ extended release tablet Take 20 mEq by mouth daily as needed        raNITIdine (ZANTAC) 150 MG tablet Take 150 mg by mouth in the morning. thiamine 100 MG tablet Take 400 mg by mouth daily        valsartan (DIOVAN) 80 MG tablet Take 80 mg by mouth in the morning. vitamin E 400 UNIT capsule Take by mouth daily        zaleplon (SONATA) 5 MG capsule Take 5 mg by mouth. No current facility-administered medications on file prior to visit. Allergies:          Allergies   Allergen Reactions    Indomethacin Shortness Of Breath       Severe dizziness    Atorvastatin Other (See Comments)    Rosuvastatin Other (See Comments)       CONFUSION         Past History:  Past Medical History        Past Medical History:   Diagnosis Date    Asthma       child    CAD (coronary artery disease)      Cardiac arrhythmia       atrial fib    Chronic kidney disease       stage 2    GERD (gastroesophageal reflux disease)      Gout      HLD (hyperlipidemia)      Hypercholesterolemia      Hypertension      Liver disease       ?? growth on liver    Prostatism      PUD (peptic ulcer disease)       15 years    Stroke [facility])           Past Surgical History         Past Surgical History:   Procedure Laterality Date    CABG, ARTERIAL, THREE   4/13/11     x3    CARDIAC CATHETERIZATION        HEENT         1985 sinus surgery    ORTHOPEDIC SURGERY         right shoulder, left knee and left foot    OTHER SURGICAL HISTORY   04/2011     Quadruple bypass    MT CARDIAC SURG PROCEDURE UNLIST         CABG, 3 vessels, 4/13/2011            Family and Social History:  Family History         Family History   Problem Relation Age of Onset    Alzheimer's Disease Mother      Stroke Father      Heart Disease Father           Social History               Socioeconomic History    Marital status:        Spouse name: Not on file    Number of children: Not on file    Years of education: Not on file    Highest education level: Not on file   Occupational History    Not on file   Tobacco Use    Smoking status: Former       Packs/day: 1.50       Types: Cigarettes       Quit date: 1970       Years since quittin.2    Smokeless tobacco: Former       Quit date: 2018    Tobacco comments:       Quit smoking: chews tobacco   Substance and Sexual Activity    Alcohol use: Yes       Alcohol/week: 1.7 standard drinks    Drug use: No    Sexual activity: Not on file   Other Topics Concern    Not on file   Social History Narrative    Not on file      Social Determinants of Health      Financial Resource Strain: Not on file   Food Insecurity: Not on file   Transportation Needs: Not on file   Physical Activity: Not on file   Stress: Not on file   Social Connections: Not on file   Intimate Partner Violence: Not on file   Housing Stability: Not on file               Review of Systems   Constitutional: Negative. HENT: Negative. Eyes: Negative. Respiratory: Negative. Cardiovascular: Negative. Gastrointestinal: Negative. Endocrine: Negative. Genitourinary:  Positive for testicular pain. Musculoskeletal: Negative. Skin: Negative. Allergic/Immunologic: Negative. Neurological: Negative. Hematological: Negative. Psychiatric/Behavioral: Negative. Physical Exam  Vitals and nursing note reviewed. HENT:      Head: Normocephalic and atraumatic. Eyes:      General: No scleral icterus. Pupils: Pupils are equal, round, and reactive to light. Cardiovascular:      Rate and Rhythm: Normal rate.    Pulmonary:      Effort: Pulmonary effort is normal. No respiratory distress. Breath sounds: No stridor. Abdominal:      General: Bowel sounds are normal.      Palpations: There is no mass. Tenderness: There is no abdominal tenderness. There is no guarding. Hernia: A hernia (small umbilical defect, reducible) is present. Hernia is present in the left inguinal area (moderate sized) and right inguinal area. Genitourinary:     Penis: Normal.       Testes: Normal.         Right: Mass or testicular hydrocele not present. Left: Mass or testicular hydrocele not present. Neurological:      General: No focal deficit present. Mental Status: He is alert and oriented to person, place, and time. Cranial Nerves: No cranial nerve deficit. Psychiatric:         Mood and Affect: Mood normal.         Behavior: Behavior normal.         Thought Content: Thought content normal.         Judgment: Judgment normal.         CT Result (most recent):  CT ABDOMEN PELVIS W IV CONTRAST 08/01/2022     Narrative  CT OF THE ABDOMEN AND PELVIS     INDICATION: Left groin pain     Multiple axial images were obtained through the abdomen and pelvis. Oral  contrast was used for bowel opacification. 100mL of Isovue 370 intravenous  contrast was used for better evaluation of solid organs and vascular structures. Radiation dose reduction techniques were used for this study. All CT scans  performed at this facility use one or all of the following: Automated exposure  control, adjustment of the mA and/or kVp according to patient's size, iterative  reconstruction. COMPARISON: 03/07/2019     FINDINGS:  - LUNG BASES: No infiltrates or masses. Post CABG changes. - LIVER: 4 cm left lobe hepatic cyst.  No developing liver mass. - GALLBLADDER/BILE DUCTS: No gallstones or bile duct dilatation.  - PANCREAS: Normal.  - SPLEEN: Normal.     - ADRENALS: Normal.  - KIDNEYS/URETERS: No hydronephrosis or significant mass.   - BLADDER: Normal.  - REPRODUCTIVE ORGANS: No pelvic masses. - BOWEL: Normal caliber. Mild sigmoid diverticulosis. No inflammatory changes. Appendix is normal in size.  - LYMPH NODES: No significant retroperitoneal, mesenteric, or pelvic adenopathy.  - BONES: No fracture or significant bone lesion.  - VASCULATURE: Moderate atherosclerosis.  - OTHER: Small bilateral inguinal hernias, no bowel involvement. Impression  1. Small bilateral inguinal hernias. 2.  No acute findings in abdomen/pelvis        If there are any questions about this report, I can be reached on [employer]  or at [phone]      ASSESSMENT and PLAN:     1. Umbilical hernia without obstruction and without gangrene    2. Non-recurrent bilateral inguinal hernia without obstruction or gangrene    3. Antiplatelet or antithrombotic long-term use       CT images reviewed- bilateral fat-containing inguinal hernias; no hydrocele. Symptoms warrant repair. He is counseled that true testicular pain is not likely to be resolved by hernia repair; he seems to be using \"groin\" and \"testicular\" pain interchangeably but is counseled that they are distinct anatomic structures. Will proceed with umbilical and laparoscopic bilateral inguinal hernia repair with mesh. Technical details of the procedure are reviewed. Risks reviewed include risks of anesthesia, bleeding, infection, visceral injury, persistent post-operative pain, and hernia recurrence. All questions are answered.    HOLD PLAVIX

## 2022-09-15 NOTE — DISCHARGE INSTRUCTIONS
Helena Sicard, M.D.  [phone]    Instructions following Hernia Repair:    ACTIVITY:  Try to take a few short walks with help around the house later today. It is very important to take short walks to avoid blood clots and pneumonia. You may be light-headed or sleepy from anesthesia, so be careful going up and down stairs. Avoid any activity that involves lifting/pushing more than 30 pounds until your followup appointment  DIET:  Drink only clear, non-carbonated liquids when you first get home (sugar-free if you are diabetic), such as Gatorade, chicken broth, etc.  Later today you may a more normal diet, depending on how you feel  Using Miralax or any other over the counter laxative is safe if you develop constipation    PAIN:  You will be given a prescription for pain medication. Try to take the pain medication with food, even a few crackers. You may also use Tylenol, Motrin, Advil, or Aleve instead of the prescription pain medication. Do no take Tylenol and the prescription pain medication within 6 hours of each other. URINARY RETENTION: If you are unable to empty your bladder within 6 hours after returning home, please go to your nearest Emergency Department or Urgent Care for urinary catheterization. WOUND CARE:  You may shower the day after surgery, unless instructed otherwise. It is not uncommon for the incisions to ooze or drain blood-tinged fluid. You may cover your incision with gauze or Band-Aids as needed. Incisions will sometimes develop redness around them, up to an inch or more around, as well as a hard lumpy feel. If this redness continues to get larger, please call the office. FOLLOW UP:  Your follow-up appointment is usually made when your surgery is arranged. Please call the office if you are not sure of this appointment. CALL THE DOCTOR IF:  You have a temperature higher than 101.5° Fahrenheit for more than 6 hours. You have severe nausea or vomiting.   You develop increasing redness or infection at the incision. Continue home medications as previously prescribed.

## 2022-09-15 NOTE — OP NOTE
Operative Report    Patient: Emily Witt MRN: [id number]     YOB: 1945  Age: 78 y.o. Sex: male       Date of Surgery: 9/15/2022     Preoperative Diagnosis: Umbilical hernia without obstruction and without gangrene [K42.9]  Non-recurrent bilateral inguinal hernia without obstruction or gangrene [K40.20]  Antiplatelet or antithrombotic long-term use [Z79.02]     Postoperative Diagnosis: * No post-op diagnosis entered *     Procedure:  1. Single Incision (SILS) Laparoscopic inguinal hernia repair with mesh- right    2. Single Incision (SILS) Laparoscopic inguinal hernia repair with mesh- left    3. Umbilical hernia repair w/ mesh (separate site)    Findings: bilateral indirect fat-containing hernias    Anesthesia: General     Complications: none    Indications:  As outlined in History and Physical.   Single incision technique is planned to provide the patient with the benefit of less incisional pain and improved cosmesis due to fewer incisions as well as the potential for lower risk of wound infection. This technique is significantly more intricate than standard laparoscopic techniques and typically increases the complexity of the procedure by 10-20%. Procedure in Detail:   Informed consent was obtained and the patient was brought to the operating room and placed on the table in supine position with adequate padding of all pressure points and compression devices on both lower extremities. After the successful induction of general anesthesia the patients abdomen was prepped and draped sterilely. Under additional local anesthesia a roughly 5mm infraumbilical incision was made and the fascia was exposed. The anterior sheath was exposed and incised to the left of the midline. The preperitoneal/retrorectus space was then developed bluntly and then the dissecting balloon was inserted and deployed, then removed. The insufflating trocar was placed and CO2 was instilled.   Visual exploration revealed no bleeding into the space with limited dissection of the space. A 5mm trocar was placed in the lower pole of the same incision. Attention was turned to the left groin. There was not an obvious direct defect. There was a significant amount of preperitoneal adipose. The region of the internal ring revealed enlargement which did have entrapped contents. Preperitoneal fat was reduced from the internal ring and off of the cord structures; the cord itself was also somewhat fatty. The peritoneum did not extend up the cord at all. This did not identify an indirect sac and the internal ring was significantly enlarged. Marcel's ligament was exposed for just 1-2centimeters; the superior pubic ramus was unusually narrow and short. After confirming hemostasis, a medium 3-D Max mesh was brought to the field, inked to aid orientation, and deployed into the preperitoneal space. It was unfurled and positioned properly with the notch over the iliac vessels and after confirming proper orientation it was fixed in place with 3 fires of the SecureStrap into Marcel's ligament and a single fire into the upper edge in its midportion. This did necessitate addition of an additional midline 5mm trocar and a veress needle was inserted in the RUQ. Attention was then turned to the other side where an identical dissection was undertaken. This did not reveal a direct defect and did not reveal an indirect sac; the internal ring was enlarged. Again, there was a significant amount of preperitoneal fat traversing the ring; this unusual amount of lateral preperitoneal fat was noted on a recent CT. A large 3-D Max mesh was deployed in an identical fashion as noted above; the pubis was more robust on the right. The preperitoneal space was then confirmed to be hemostatic and the meshes confirmed to be in good position. Insufflation was released and the trocars were removed. Attention was then turned to the umbilical hernia. This hernia is separate from the fascial site used for the above. Sharp and blunt dissection was used to identify and isolate the hernia stalk from the surrounding tissues. The umbilical skin was then dissected off of the contents and reflected superiorly. The hernia was then amputated at the fascial level, revealing a 1.5cm fascial defect. Digital exploration of the abdominal wall did not reveal additional defects. A medium Ventralex patch was brought to the field and inserted through the defect with care taken to avoid entrapment of any visceral structures and assured to be completely unfurled. The positioning straps were then trimmed and sutured to the sides of the defect with 2-0 Prolene. The area was then irrigated and confirmed hemostatic and infiltrated with additional local.  The umbilical skin was then tacked to the fascia with vicryl to recreate the umbilicus,   then the incision was  made hemostatic with cautery and closed with subcuticular 4-0 Vicryl. As closure was completed the patient began to awaken from anesthesia and with vigorous cough the umbilical suture was hard to break. The incision was reopened and the fascial closure suture over the mesh was replaced as was the closure of the additional trocar site with 0-vicryl. Steri-Strips were applied. The patient tolerated the procedure well. There were no immediate apparent complications. The patient was awakened from anesthesia,  extubated in the operating room, and taken to recovery in satisfactory condition. Estimated Blood Loss: per anesthesia           Specimens: none        Counts: Sponge and needle counts were correct.     Signed By:  Ajit Martínez MD     September 15, 2022

## 2022-09-16 NOTE — ANESTHESIA POSTPROCEDURE EVALUATION
Department of Anesthesiology  Postprocedure Note    Patient: Olga Ortiz MRN: [id number]  YOB: 1945  Date of evaluation: 9/16/2022      Procedure Summary     Date: 09/15/22 Room / Location: [facility] MAIN OR 02 / [facility] MAIN OR    Anesthesia Start: 1257 Anesthesia Stop: 4975    Procedures: HERNIA UMBILICAL REPAIR (Abdomen)      LAPAROSCOPIC BILATERAL INGUINAL HERNIA REPAIR WITH MESH (Bilateral: Groin) Diagnosis:       Umbilical hernia without obstruction and without gangrene      Non-recurrent bilateral inguinal hernia without obstruction or gangrene      Antiplatelet or antithrombotic long-term use      (Umbilical hernia without obstruction and without gangrene [K42.9])      (Non-recurrent bilateral inguinal hernia without obstruction or gangrene [K40.20])      (Antiplatelet or antithrombotic long-term use [Z79.02])    Surgeons: Norma Fitzgerald MD Responsible Provider: Ivett Eugene MD    Anesthesia Type: general ASA Status: 3          Anesthesia Type: No value filed. Damon Phase I: Damon Score: 10    Damon Phase II: Damon Score: 10      Anesthesia Post Evaluation    Patient location during evaluation: PACU  Patient participation: complete - patient participated  Level of consciousness: awake and alert  Airway patency: patent  Nausea: well controlled. Complications: no  Cardiovascular status: acceptable.   Respiratory status: acceptable  Hydration status: stable

## 2022-09-18 ENCOUNTER — HOSPITAL ENCOUNTER (INPATIENT)
Age: 77
LOS: 5 days | Discharge: HOME | DRG: 352 | End: 2022-09-23
Attending: GENERAL PRACTICE | Admitting: SURGERY
Payer: MEDICARE

## 2022-09-18 ENCOUNTER — HOSPITAL ENCOUNTER (EMERGENCY)
Dept: CT IMAGING | Age: 77
Discharge: HOME | DRG: 352 | End: 2022-09-21
Payer: MEDICARE

## 2022-09-18 DIAGNOSIS — K56.609 SMALL BOWEL OBSTRUCTION (HCC): Primary | ICD-10-CM

## 2022-09-18 DIAGNOSIS — K42.9 UMBILICAL HERNIA WITHOUT OBSTRUCTION AND WITHOUT GANGRENE: ICD-10-CM

## 2022-09-18 DIAGNOSIS — K40.20 NON-RECURRENT BILATERAL INGUINAL HERNIA WITHOUT OBSTRUCTION OR GANGRENE: ICD-10-CM

## 2022-09-18 LAB
ALBUMIN SERPL-MCNC: 3.7 G/DL (ref 3.2–4.6)
ALBUMIN/GLOB SERPL: 1 {RATIO} (ref 1.2–3.5)
ALP SERPL-CCNC: 48 U/L (ref 50–136)
ALT SERPL-CCNC: 35 U/L (ref 12–65)
ANION GAP SERPL CALC-SCNC: 6 MMOL/L (ref 4–13)
AST SERPL-CCNC: 40 U/L (ref 15–37)
BASOPHILS # BLD: 0.1 K/UL (ref 0–0.2)
BASOPHILS NFR BLD: 1 % (ref 0–2)
BILIRUB SERPL-MCNC: 1.4 MG/DL (ref 0.2–1.1)
BUN SERPL-MCNC: 19 MG/DL (ref 8–23)
CALCIUM SERPL-MCNC: 9.8 MG/DL (ref 8.3–10.4)
CHLORIDE SERPL-SCNC: 97 MMOL/L (ref 101–110)
CO2 SERPL-SCNC: 33 MMOL/L (ref 21–32)
CREAT SERPL-MCNC: 1.29 MG/DL (ref 0.8–1.5)
DIFFERENTIAL METHOD BLD: ABNORMAL
EOSINOPHIL # BLD: 0.2 K/UL (ref 0–0.8)
EOSINOPHIL NFR BLD: 2 % (ref 0.5–7.8)
ERYTHROCYTE [DISTWIDTH] IN BLOOD BY AUTOMATED COUNT: 13.1 % (ref 11.9–14.6)
GLOBULIN SER CALC-MCNC: 3.7 G/DL (ref 2.3–3.5)
GLUCOSE SERPL-MCNC: 105 MG/DL (ref 65–100)
HCT VFR BLD AUTO: 42.8 % (ref 41.1–50.3)
HGB BLD-MCNC: 14.1 G/DL (ref 13.6–17.2)
IMM GRANULOCYTES # BLD AUTO: 0 K/UL (ref 0–0.5)
IMM GRANULOCYTES NFR BLD AUTO: 0 % (ref 0–5)
LIPASE SERPL-CCNC: 107 U/L (ref 73–393)
LYMPHOCYTES # BLD: 2.2 K/UL (ref 0.5–4.6)
LYMPHOCYTES NFR BLD: 21 % (ref 13–44)
MCH RBC QN AUTO: 28.7 PG (ref 26.1–32.9)
MCHC RBC AUTO-ENTMCNC: 32.9 G/DL (ref 31.4–35)
MCV RBC AUTO: 87 FL (ref 79.6–97.8)
MONOCYTES # BLD: 1.3 K/UL (ref 0.1–1.3)
MONOCYTES NFR BLD: 13 % (ref 4–12)
NEUTS SEG # BLD: 6.6 K/UL (ref 1.7–8.2)
NEUTS SEG NFR BLD: 64 % (ref 43–78)
NRBC # BLD: 0 K/UL (ref 0–0.2)
PLATELET # BLD AUTO: 234 K/UL (ref 150–450)
PMV BLD AUTO: 8.8 FL (ref 9.4–12.3)
POTASSIUM SERPL-SCNC: 3.3 MMOL/L (ref 3.5–5.1)
PROT SERPL-MCNC: 7.4 G/DL (ref 6.3–8.2)
RBC # BLD AUTO: 4.92 M/UL (ref 4.23–5.6)
SODIUM SERPL-SCNC: 136 MMOL/L (ref 136–145)
WBC # BLD AUTO: 10.3 K/UL (ref 4.3–11.1)

## 2022-09-18 PROCEDURE — 74177 CT ABD & PELVIS W/CONTRAST: CPT

## 2022-09-18 PROCEDURE — 1100000000 HC RM PRIVATE

## 2022-09-18 PROCEDURE — 6360000002 HC RX W HCPCS: Performed by: GENERAL PRACTICE

## 2022-09-18 PROCEDURE — 2580000003 HC RX 258: Performed by: GENERAL PRACTICE

## 2022-09-18 PROCEDURE — 99285 EMERGENCY DEPT VISIT HI MDM: CPT

## 2022-09-18 PROCEDURE — 6360000004 HC RX CONTRAST MEDICATION: Performed by: GENERAL PRACTICE

## 2022-09-18 PROCEDURE — 83690 ASSAY OF LIPASE: CPT

## 2022-09-18 PROCEDURE — 80053 COMPREHEN METABOLIC PANEL: CPT

## 2022-09-18 PROCEDURE — 85025 COMPLETE CBC W/AUTO DIFF WBC: CPT

## 2022-09-18 RX ORDER — MORPHINE SULFATE 4 MG/ML
4 INJECTION INTRAVENOUS
Status: COMPLETED | OUTPATIENT
Start: 2022-09-18 | End: 2022-09-18

## 2022-09-18 RX ORDER — ACETAMINOPHEN 650 MG/1
650 SUPPOSITORY RECTAL EVERY 6 HOURS PRN
Status: DISCONTINUED | OUTPATIENT
Start: 2022-09-18 | End: 2022-09-18

## 2022-09-18 RX ORDER — SODIUM CHLORIDE 0.9 % (FLUSH) 0.9 %
10 SYRINGE (ML) INJECTION
Status: COMPLETED | OUTPATIENT
Start: 2022-09-18 | End: 2022-09-18

## 2022-09-18 RX ORDER — MORPHINE SULFATE 4 MG/ML
4 INJECTION INTRAVENOUS EVERY 4 HOURS PRN
Status: DISCONTINUED | OUTPATIENT
Start: 2022-09-18 | End: 2022-09-19

## 2022-09-18 RX ORDER — ONDANSETRON 4 MG/1
4 TABLET, ORALLY DISINTEGRATING ORAL EVERY 8 HOURS PRN
Status: DISCONTINUED | OUTPATIENT
Start: 2022-09-18 | End: 2022-09-23 | Stop reason: HOSPADM

## 2022-09-18 RX ORDER — SODIUM CHLORIDE 0.9 % (FLUSH) 0.9 %
5-40 SYRINGE (ML) INJECTION PRN
Status: DISCONTINUED | OUTPATIENT
Start: 2022-09-18 | End: 2022-09-23 | Stop reason: HOSPADM

## 2022-09-18 RX ORDER — ACETAMINOPHEN 325 MG/1
650 TABLET ORAL EVERY 6 HOURS PRN
Status: DISCONTINUED | OUTPATIENT
Start: 2022-09-18 | End: 2022-09-19

## 2022-09-18 RX ORDER — MORPHINE SULFATE 2 MG/ML
2 INJECTION, SOLUTION INTRAMUSCULAR; INTRAVENOUS EVERY 4 HOURS PRN
Status: DISCONTINUED | OUTPATIENT
Start: 2022-09-18 | End: 2022-09-19

## 2022-09-18 RX ORDER — ACETAMINOPHEN 650 MG/1
650 SUPPOSITORY RECTAL EVERY 6 HOURS PRN
Status: DISCONTINUED | OUTPATIENT
Start: 2022-09-18 | End: 2022-09-19

## 2022-09-18 RX ORDER — ACETAMINOPHEN 325 MG/1
650 TABLET ORAL EVERY 6 HOURS PRN
Status: DISCONTINUED | OUTPATIENT
Start: 2022-09-18 | End: 2022-09-18

## 2022-09-18 RX ORDER — ONDANSETRON 2 MG/ML
4 INJECTION INTRAMUSCULAR; INTRAVENOUS ONCE
Status: COMPLETED | OUTPATIENT
Start: 2022-09-18 | End: 2022-09-18

## 2022-09-18 RX ORDER — ONDANSETRON 2 MG/ML
4 INJECTION INTRAMUSCULAR; INTRAVENOUS EVERY 6 HOURS PRN
Status: DISCONTINUED | OUTPATIENT
Start: 2022-09-18 | End: 2022-09-23 | Stop reason: HOSPADM

## 2022-09-18 RX ORDER — SODIUM CHLORIDE 9 MG/ML
INJECTION, SOLUTION INTRAVENOUS PRN
Status: DISCONTINUED | OUTPATIENT
Start: 2022-09-18 | End: 2022-09-23 | Stop reason: HOSPADM

## 2022-09-18 RX ORDER — POTASSIUM CHLORIDE 7.45 MG/ML
10 INJECTION INTRAVENOUS EVERY 4 HOURS
Status: COMPLETED | OUTPATIENT
Start: 2022-09-18 | End: 2022-09-19

## 2022-09-18 RX ORDER — 0.9 % SODIUM CHLORIDE 0.9 %
100 INTRAVENOUS SOLUTION INTRAVENOUS ONCE
Status: COMPLETED | OUTPATIENT
Start: 2022-09-18 | End: 2022-09-18

## 2022-09-18 RX ORDER — SODIUM CHLORIDE 0.9 % (FLUSH) 0.9 %
5-40 SYRINGE (ML) INJECTION EVERY 12 HOURS SCHEDULED
Status: DISCONTINUED | OUTPATIENT
Start: 2022-09-18 | End: 2022-09-23 | Stop reason: HOSPADM

## 2022-09-18 RX ORDER — SODIUM CHLORIDE 9 MG/ML
INJECTION, SOLUTION INTRAVENOUS CONTINUOUS
Status: DISCONTINUED | OUTPATIENT
Start: 2022-09-18 | End: 2022-09-22

## 2022-09-18 RX ADMIN — IOPAMIDOL 100 ML: 755 INJECTION, SOLUTION INTRAVENOUS at 22:16

## 2022-09-18 RX ADMIN — SODIUM CHLORIDE, PRESERVATIVE FREE 10 ML: 5 INJECTION INTRAVENOUS at 22:16

## 2022-09-18 RX ADMIN — MORPHINE SULFATE 4 MG: 4 INJECTION INTRAVENOUS at 23:38

## 2022-09-18 RX ADMIN — ONDANSETRON 4 MG: 2 INJECTION INTRAMUSCULAR; INTRAVENOUS at 23:38

## 2022-09-18 RX ADMIN — SODIUM CHLORIDE 100 ML: 9 INJECTION, SOLUTION INTRAVENOUS at 22:16

## 2022-09-18 ASSESSMENT — PAIN - FUNCTIONAL ASSESSMENT: PAIN_FUNCTIONAL_ASSESSMENT: NONE - DENIES PAIN

## 2022-09-18 NOTE — ED TRIAGE NOTES
Pt c/o abdominal pain that started Thursday. Pt states he had hernia surgery on Thursday. Pt states he has not had a bowel movement since Wednesday. Pt states he has been taking Norco for pain, pt states he has been taking Colace and Miralax. Pt states he vomited yesterday x5 times back to back.

## 2022-09-19 ENCOUNTER — APPOINTMENT (OUTPATIENT)
Dept: GENERAL RADIOLOGY | Age: 77
DRG: 352 | End: 2022-09-19
Payer: MEDICARE

## 2022-09-19 ENCOUNTER — HOSPITAL ENCOUNTER (INPATIENT)
Dept: GENERAL RADIOLOGY | Age: 77
Discharge: HOME | DRG: 352 | End: 2022-09-22
Payer: MEDICARE

## 2022-09-19 ENCOUNTER — ANESTHESIA (OUTPATIENT)
Dept: SURGERY | Age: 77
DRG: 352 | End: 2022-09-19
Payer: MEDICARE

## 2022-09-19 ENCOUNTER — ANESTHESIA EVENT (OUTPATIENT)
Dept: SURGERY | Age: 77
DRG: 352 | End: 2022-09-19
Payer: MEDICARE

## 2022-09-19 LAB
ALBUMIN SERPL-MCNC: 3.3 G/DL (ref 3.2–4.6)
ALBUMIN/GLOB SERPL: 0.9 {RATIO} (ref 1.2–3.5)
ALP SERPL-CCNC: 47 U/L (ref 50–136)
ALT SERPL-CCNC: 28 U/L (ref 12–65)
ANION GAP SERPL CALC-SCNC: 7 MMOL/L (ref 4–13)
AST SERPL-CCNC: 36 U/L (ref 15–37)
BASOPHILS # BLD: 0 K/UL (ref 0–0.2)
BASOPHILS NFR BLD: 1 % (ref 0–2)
BILIRUB SERPL-MCNC: 1.2 MG/DL (ref 0.2–1.1)
BUN SERPL-MCNC: 19 MG/DL (ref 8–23)
CALCIUM SERPL-MCNC: 9.3 MG/DL (ref 8.3–10.4)
CHLORIDE SERPL-SCNC: 100 MMOL/L (ref 101–110)
CO2 SERPL-SCNC: 28 MMOL/L (ref 21–32)
CREAT SERPL-MCNC: 1.2 MG/DL (ref 0.8–1.5)
DIFFERENTIAL METHOD BLD: ABNORMAL
EOSINOPHIL # BLD: 0.1 K/UL (ref 0–0.8)
EOSINOPHIL NFR BLD: 2 % (ref 0.5–7.8)
ERYTHROCYTE [DISTWIDTH] IN BLOOD BY AUTOMATED COUNT: 13 % (ref 11.9–14.6)
GLOBULIN SER CALC-MCNC: 3.5 G/DL (ref 2.3–3.5)
GLUCOSE SERPL-MCNC: 92 MG/DL (ref 65–100)
HCT VFR BLD AUTO: 47.1 % (ref 41.1–50.3)
HGB BLD-MCNC: 15.3 G/DL (ref 13.6–17.2)
IMM GRANULOCYTES # BLD AUTO: 0 K/UL (ref 0–0.5)
IMM GRANULOCYTES NFR BLD AUTO: 0 % (ref 0–5)
LYMPHOCYTES # BLD: 1.6 K/UL (ref 0.5–4.6)
LYMPHOCYTES NFR BLD: 19 % (ref 13–44)
MCH RBC QN AUTO: 28.9 PG (ref 26.1–32.9)
MCHC RBC AUTO-ENTMCNC: 32.5 G/DL (ref 31.4–35)
MCV RBC AUTO: 88.9 FL (ref 79.6–97.8)
MONOCYTES # BLD: 0.9 K/UL (ref 0.1–1.3)
MONOCYTES NFR BLD: 11 % (ref 4–12)
NEUTS SEG # BLD: 6 K/UL (ref 1.7–8.2)
NEUTS SEG NFR BLD: 68 % (ref 43–78)
NRBC # BLD: 0 K/UL (ref 0–0.2)
PLATELET # BLD AUTO: 208 K/UL (ref 150–450)
PMV BLD AUTO: 8.8 FL (ref 9.4–12.3)
POTASSIUM SERPL-SCNC: 3.7 MMOL/L (ref 3.5–5.1)
PROT SERPL-MCNC: 6.8 G/DL (ref 6.3–8.2)
RBC # BLD AUTO: 5.3 M/UL (ref 4.23–5.6)
SODIUM SERPL-SCNC: 135 MMOL/L (ref 136–145)
WBC # BLD AUTO: 8.8 K/UL (ref 4.3–11.1)

## 2022-09-19 PROCEDURE — 74018 RADEX ABDOMEN 1 VIEW: CPT

## 2022-09-19 PROCEDURE — 2500000003 HC RX 250 WO HCPCS: Performed by: NURSE ANESTHETIST, CERTIFIED REGISTERED

## 2022-09-19 PROCEDURE — 6360000002 HC RX W HCPCS: Performed by: NURSE ANESTHETIST, CERTIFIED REGISTERED

## 2022-09-19 PROCEDURE — 3600000004 HC SURGERY LEVEL 4 BASE: Performed by: SURGERY

## 2022-09-19 PROCEDURE — 1100000000 HC RM PRIVATE

## 2022-09-19 PROCEDURE — 80053 COMPREHEN METABOLIC PANEL: CPT

## 2022-09-19 PROCEDURE — 44005 FREEING OF BOWEL ADHESION: CPT | Performed by: SURGERY

## 2022-09-19 PROCEDURE — 2709999900 HC NON-CHARGEABLE SUPPLY: Performed by: SURGERY

## 2022-09-19 PROCEDURE — 6360000002 HC RX W HCPCS: Performed by: ANESTHESIOLOGY

## 2022-09-19 PROCEDURE — 6360000002 HC RX W HCPCS: Performed by: NURSE PRACTITIONER

## 2022-09-19 PROCEDURE — 3700000000 HC ANESTHESIA ATTENDED CARE: Performed by: SURGERY

## 2022-09-19 PROCEDURE — C1781 MESH (IMPLANTABLE): HCPCS | Performed by: SURGERY

## 2022-09-19 PROCEDURE — 2580000003 HC RX 258: Performed by: NURSE ANESTHETIST, CERTIFIED REGISTERED

## 2022-09-19 PROCEDURE — 3700000001 HC ADD 15 MINUTES (ANESTHESIA): Performed by: SURGERY

## 2022-09-19 PROCEDURE — 36415 COLL VENOUS BLD VENIPUNCTURE: CPT

## 2022-09-19 PROCEDURE — 2500000003 HC RX 250 WO HCPCS: Performed by: NURSE PRACTITIONER

## 2022-09-19 PROCEDURE — 6360000002 HC RX W HCPCS: Performed by: SURGERY

## 2022-09-19 PROCEDURE — 6370000000 HC RX 637 (ALT 250 FOR IP): Performed by: NURSE PRACTITIONER

## 2022-09-19 PROCEDURE — 85025 COMPLETE CBC W/AUTO DIFF WBC: CPT

## 2022-09-19 PROCEDURE — 7100000000 HC PACU RECOVERY - FIRST 15 MIN: Performed by: SURGERY

## 2022-09-19 PROCEDURE — 0WWF0JZ REVISION OF SYNTHETIC SUBSTITUTE IN ABDOMINAL WALL, OPEN APPROACH: ICD-10-PCS | Performed by: SURGERY

## 2022-09-19 PROCEDURE — A4216 STERILE WATER/SALINE, 10 ML: HCPCS | Performed by: NURSE PRACTITIONER

## 2022-09-19 PROCEDURE — 2580000003 HC RX 258: Performed by: NURSE PRACTITIONER

## 2022-09-19 PROCEDURE — 3600000014 HC SURGERY LEVEL 4 ADDTL 15MIN: Performed by: SURGERY

## 2022-09-19 PROCEDURE — 7100000001 HC PACU RECOVERY - ADDTL 15 MIN: Performed by: SURGERY

## 2022-09-19 PROCEDURE — 2500000003 HC RX 250 WO HCPCS: Performed by: SURGERY

## 2022-09-19 DEVICE — BARD VENTRALEX HERNIA PATCH, 6.4 CM (2.5"), MEDIUM CIRCLE WITH STRAP
Type: IMPLANTABLE DEVICE | Site: ABDOMEN | Status: FUNCTIONAL
Brand: VENTRALEX

## 2022-09-19 RX ORDER — LIDOCAINE HYDROCHLORIDE 20 MG/ML
INJECTION, SOLUTION EPIDURAL; INFILTRATION; INTRACAUDAL; PERINEURAL PRN
Status: DISCONTINUED | OUTPATIENT
Start: 2022-09-19 | End: 2022-09-19 | Stop reason: SDUPTHER

## 2022-09-19 RX ORDER — LORAZEPAM 1 MG/1
1 TABLET ORAL 2 TIMES DAILY PRN
Status: DISCONTINUED | OUTPATIENT
Start: 2022-09-19 | End: 2022-09-23 | Stop reason: HOSPADM

## 2022-09-19 RX ORDER — SODIUM CHLORIDE, SODIUM LACTATE, POTASSIUM CHLORIDE, CALCIUM CHLORIDE 600; 310; 30; 20 MG/100ML; MG/100ML; MG/100ML; MG/100ML
INJECTION, SOLUTION INTRAVENOUS CONTINUOUS
Status: DISCONTINUED | OUTPATIENT
Start: 2022-09-19 | End: 2022-09-19 | Stop reason: HOSPADM

## 2022-09-19 RX ORDER — MIDAZOLAM HYDROCHLORIDE 1 MG/ML
INJECTION INTRAMUSCULAR; INTRAVENOUS PRN
Status: DISCONTINUED | OUTPATIENT
Start: 2022-09-19 | End: 2022-09-19 | Stop reason: SDUPTHER

## 2022-09-19 RX ORDER — SODIUM CHLORIDE, SODIUM LACTATE, POTASSIUM CHLORIDE, CALCIUM CHLORIDE 600; 310; 30; 20 MG/100ML; MG/100ML; MG/100ML; MG/100ML
INJECTION, SOLUTION INTRAVENOUS CONTINUOUS
Status: DISCONTINUED | OUTPATIENT
Start: 2022-09-19 | End: 2022-09-19

## 2022-09-19 RX ORDER — LIDOCAINE HYDROCHLORIDE 10 MG/ML
1 INJECTION, SOLUTION INFILTRATION; PERINEURAL
Status: DISCONTINUED | OUTPATIENT
Start: 2022-09-19 | End: 2022-09-19 | Stop reason: HOSPADM

## 2022-09-19 RX ORDER — SODIUM CHLORIDE 9 MG/ML
INJECTION, SOLUTION INTRAVENOUS PRN
Status: DISCONTINUED | OUTPATIENT
Start: 2022-09-19 | End: 2022-09-19 | Stop reason: HOSPADM

## 2022-09-19 RX ORDER — DEXTROSE MONOHYDRATE 100 MG/ML
INJECTION, SOLUTION INTRAVENOUS CONTINUOUS PRN
Status: DISCONTINUED | OUTPATIENT
Start: 2022-09-19 | End: 2022-09-19 | Stop reason: HOSPADM

## 2022-09-19 RX ORDER — CLOPIDOGREL BISULFATE 75 MG/1
75 TABLET ORAL DAILY
Status: DISCONTINUED | OUTPATIENT
Start: 2022-09-19 | End: 2022-09-21

## 2022-09-19 RX ORDER — DIPHENHYDRAMINE HYDROCHLORIDE 50 MG/ML
12.5 INJECTION INTRAMUSCULAR; INTRAVENOUS
Status: DISCONTINUED | OUTPATIENT
Start: 2022-09-19 | End: 2022-09-19 | Stop reason: HOSPADM

## 2022-09-19 RX ORDER — SUCCINYLCHOLINE CHLORIDE 20 MG/ML
INJECTION INTRAMUSCULAR; INTRAVENOUS PRN
Status: DISCONTINUED | OUTPATIENT
Start: 2022-09-19 | End: 2022-09-19 | Stop reason: SDUPTHER

## 2022-09-19 RX ORDER — ONDANSETRON 2 MG/ML
INJECTION INTRAMUSCULAR; INTRAVENOUS PRN
Status: DISCONTINUED | OUTPATIENT
Start: 2022-09-19 | End: 2022-09-19 | Stop reason: SDUPTHER

## 2022-09-19 RX ORDER — SODIUM CHLORIDE 0.9 % (FLUSH) 0.9 %
5-40 SYRINGE (ML) INJECTION EVERY 12 HOURS SCHEDULED
Status: DISCONTINUED | OUTPATIENT
Start: 2022-09-19 | End: 2022-09-19 | Stop reason: HOSPADM

## 2022-09-19 RX ORDER — ROCURONIUM BROMIDE 10 MG/ML
INJECTION, SOLUTION INTRAVENOUS PRN
Status: DISCONTINUED | OUTPATIENT
Start: 2022-09-19 | End: 2022-09-19 | Stop reason: SDUPTHER

## 2022-09-19 RX ORDER — MIDAZOLAM HYDROCHLORIDE 2 MG/2ML
2 INJECTION, SOLUTION INTRAMUSCULAR; INTRAVENOUS
Status: DISCONTINUED | OUTPATIENT
Start: 2022-09-19 | End: 2022-09-19 | Stop reason: HOSPADM

## 2022-09-19 RX ORDER — ACETAMINOPHEN 325 MG/1
650 TABLET ORAL EVERY 6 HOURS PRN
Status: DISCONTINUED | OUTPATIENT
Start: 2022-09-19 | End: 2022-09-23 | Stop reason: HOSPADM

## 2022-09-19 RX ORDER — LOSARTAN POTASSIUM 50 MG/1
100 TABLET ORAL DAILY
Status: DISCONTINUED | OUTPATIENT
Start: 2022-09-19 | End: 2022-09-20

## 2022-09-19 RX ORDER — MONTELUKAST SODIUM 10 MG/1
10 TABLET ORAL DAILY
Status: DISCONTINUED | OUTPATIENT
Start: 2022-09-19 | End: 2022-09-23 | Stop reason: HOSPADM

## 2022-09-19 RX ORDER — NEOSTIGMINE METHYLSULFATE 1 MG/ML
INJECTION, SOLUTION INTRAVENOUS PRN
Status: DISCONTINUED | OUTPATIENT
Start: 2022-09-19 | End: 2022-09-19 | Stop reason: SDUPTHER

## 2022-09-19 RX ORDER — OXYCODONE HYDROCHLORIDE 5 MG/1
5 TABLET ORAL
Status: DISCONTINUED | OUTPATIENT
Start: 2022-09-19 | End: 2022-09-19 | Stop reason: HOSPADM

## 2022-09-19 RX ORDER — HYDROMORPHONE HYDROCHLORIDE 1 MG/ML
0.5 INJECTION, SOLUTION INTRAMUSCULAR; INTRAVENOUS; SUBCUTANEOUS EVERY 10 MIN PRN
Status: DISCONTINUED | OUTPATIENT
Start: 2022-09-19 | End: 2022-09-19 | Stop reason: HOSPADM

## 2022-09-19 RX ORDER — VALSARTAN 40 MG/1
80 TABLET ORAL DAILY
Status: DISCONTINUED | OUTPATIENT
Start: 2022-09-19 | End: 2022-09-20

## 2022-09-19 RX ORDER — SODIUM CHLORIDE 0.9 % (FLUSH) 0.9 %
5-40 SYRINGE (ML) INJECTION PRN
Status: DISCONTINUED | OUTPATIENT
Start: 2022-09-19 | End: 2022-09-19 | Stop reason: HOSPADM

## 2022-09-19 RX ORDER — FENTANYL CITRATE 50 UG/ML
INJECTION, SOLUTION INTRAMUSCULAR; INTRAVENOUS PRN
Status: DISCONTINUED | OUTPATIENT
Start: 2022-09-19 | End: 2022-09-19 | Stop reason: SDUPTHER

## 2022-09-19 RX ORDER — DEXAMETHASONE SODIUM PHOSPHATE 10 MG/ML
INJECTION INTRAMUSCULAR; INTRAVENOUS PRN
Status: DISCONTINUED | OUTPATIENT
Start: 2022-09-19 | End: 2022-09-19 | Stop reason: SDUPTHER

## 2022-09-19 RX ORDER — GLYCOPYRROLATE 0.2 MG/ML
INJECTION INTRAMUSCULAR; INTRAVENOUS PRN
Status: DISCONTINUED | OUTPATIENT
Start: 2022-09-19 | End: 2022-09-19 | Stop reason: SDUPTHER

## 2022-09-19 RX ORDER — METOPROLOL SUCCINATE 25 MG/1
25 TABLET, EXTENDED RELEASE ORAL DAILY
Status: DISCONTINUED | OUTPATIENT
Start: 2022-09-19 | End: 2022-09-21

## 2022-09-19 RX ORDER — BUPIVACAINE HYDROCHLORIDE AND EPINEPHRINE 5; 5 MG/ML; UG/ML
INJECTION, SOLUTION EPIDURAL; INTRACAUDAL; PERINEURAL PRN
Status: DISCONTINUED | OUTPATIENT
Start: 2022-09-19 | End: 2022-09-19 | Stop reason: ALTCHOICE

## 2022-09-19 RX ORDER — SODIUM CHLORIDE, SODIUM LACTATE, POTASSIUM CHLORIDE, CALCIUM CHLORIDE 600; 310; 30; 20 MG/100ML; MG/100ML; MG/100ML; MG/100ML
INJECTION, SOLUTION INTRAVENOUS CONTINUOUS PRN
Status: DISCONTINUED | OUTPATIENT
Start: 2022-09-19 | End: 2022-09-19 | Stop reason: SDUPTHER

## 2022-09-19 RX ORDER — PROPOFOL 10 MG/ML
INJECTION, EMULSION INTRAVENOUS PRN
Status: DISCONTINUED | OUTPATIENT
Start: 2022-09-19 | End: 2022-09-19 | Stop reason: SDUPTHER

## 2022-09-19 RX ORDER — PAROXETINE HYDROCHLORIDE 20 MG/1
30 TABLET, FILM COATED ORAL EVERY EVENING
Status: DISCONTINUED | OUTPATIENT
Start: 2022-09-19 | End: 2022-09-23 | Stop reason: HOSPADM

## 2022-09-19 RX ORDER — HYDROMORPHONE HYDROCHLORIDE 1 MG/ML
0.5 INJECTION, SOLUTION INTRAMUSCULAR; INTRAVENOUS; SUBCUTANEOUS EVERY 4 HOURS PRN
Status: DISCONTINUED | OUTPATIENT
Start: 2022-09-19 | End: 2022-09-23 | Stop reason: HOSPADM

## 2022-09-19 RX ORDER — PROCHLORPERAZINE EDISYLATE 5 MG/ML
5 INJECTION INTRAMUSCULAR; INTRAVENOUS
Status: DISCONTINUED | OUTPATIENT
Start: 2022-09-19 | End: 2022-09-19 | Stop reason: HOSPADM

## 2022-09-19 RX ADMIN — MIDAZOLAM 2 MG: 1 INJECTION INTRAMUSCULAR; INTRAVENOUS at 14:40

## 2022-09-19 RX ADMIN — MORPHINE SULFATE 4 MG: 4 INJECTION INTRAVENOUS at 06:20

## 2022-09-19 RX ADMIN — ONDANSETRON 4 MG: 2 INJECTION INTRAMUSCULAR; INTRAVENOUS at 15:08

## 2022-09-19 RX ADMIN — HYDROMORPHONE HYDROCHLORIDE 0.5 MG: 1 INJECTION, SOLUTION INTRAMUSCULAR; INTRAVENOUS; SUBCUTANEOUS at 16:17

## 2022-09-19 RX ADMIN — ACETAMINOPHEN 650 MG: 325 TABLET, FILM COATED ORAL at 20:30

## 2022-09-19 RX ADMIN — POTASSIUM CHLORIDE 10 MEQ: 7.46 INJECTION, SOLUTION INTRAVENOUS at 03:55

## 2022-09-19 RX ADMIN — GLYCOPYRROLATE 0.4 MG: 0.2 INJECTION, SOLUTION INTRAMUSCULAR; INTRAVENOUS at 15:31

## 2022-09-19 RX ADMIN — POTASSIUM CHLORIDE 10 MEQ: 7.46 INJECTION, SOLUTION INTRAVENOUS at 01:58

## 2022-09-19 RX ADMIN — LIDOCAINE HYDROCHLORIDE 100 MG: 20 INJECTION, SOLUTION EPIDURAL; INFILTRATION; INTRACAUDAL; PERINEURAL at 14:45

## 2022-09-19 RX ADMIN — SODIUM CHLORIDE, PRESERVATIVE FREE 10 ML: 5 INJECTION INTRAVENOUS at 09:21

## 2022-09-19 RX ADMIN — SODIUM CHLORIDE, PRESERVATIVE FREE 10 ML: 5 INJECTION INTRAVENOUS at 03:54

## 2022-09-19 RX ADMIN — SODIUM CHLORIDE, SODIUM LACTATE, POTASSIUM CHLORIDE, AND CALCIUM CHLORIDE: 600; 310; 30; 20 INJECTION, SOLUTION INTRAVENOUS at 14:37

## 2022-09-19 RX ADMIN — SODIUM CHLORIDE: 9 INJECTION, SOLUTION INTRAVENOUS at 03:55

## 2022-09-19 RX ADMIN — SODIUM CHLORIDE, PRESERVATIVE FREE 20 MG: 5 INJECTION INTRAVENOUS at 09:20

## 2022-09-19 RX ADMIN — DEXAMETHASONE SODIUM PHOSPHATE 10 MG: 10 INJECTION INTRAMUSCULAR; INTRAVENOUS at 15:08

## 2022-09-19 RX ADMIN — HYDROMORPHONE HYDROCHLORIDE 0.5 MG: 1 INJECTION, SOLUTION INTRAMUSCULAR; INTRAVENOUS; SUBCUTANEOUS at 18:26

## 2022-09-19 RX ADMIN — FENTANYL CITRATE 100 MCG: 50 INJECTION, SOLUTION INTRAMUSCULAR; INTRAVENOUS at 14:45

## 2022-09-19 RX ADMIN — Medication 3 MG: at 15:31

## 2022-09-19 RX ADMIN — Medication 200 MG: at 14:45

## 2022-09-19 RX ADMIN — PROPOFOL 200 MG: 10 INJECTION, EMULSION INTRAVENOUS at 14:45

## 2022-09-19 RX ADMIN — Medication 2 G: at 14:43

## 2022-09-19 RX ADMIN — SODIUM CHLORIDE, PRESERVATIVE FREE 20 MG: 5 INJECTION INTRAVENOUS at 20:30

## 2022-09-19 RX ADMIN — ROCURONIUM BROMIDE 35 MG: 50 INJECTION, SOLUTION INTRAVENOUS at 14:50

## 2022-09-19 RX ADMIN — SODIUM CHLORIDE, SODIUM LACTATE, POTASSIUM CHLORIDE, AND CALCIUM CHLORIDE: 600; 310; 30; 20 INJECTION, SOLUTION INTRAVENOUS at 15:35

## 2022-09-19 ASSESSMENT — PAIN SCALES - GENERAL
PAINLEVEL_OUTOF10: 7
PAINLEVEL_OUTOF10: 5
PAINLEVEL_OUTOF10: 8

## 2022-09-19 ASSESSMENT — PAIN DESCRIPTION - LOCATION
LOCATION: ABDOMEN
LOCATION: ABDOMEN

## 2022-09-19 ASSESSMENT — ENCOUNTER SYMPTOMS: DYSPNEA ACTIVITY LEVEL: NO INTERVAL CHANGE

## 2022-09-19 ASSESSMENT — PAIN DESCRIPTION - DESCRIPTORS: DESCRIPTORS: ACHING;DISCOMFORT

## 2022-09-19 ASSESSMENT — PAIN DESCRIPTION - ORIENTATION: ORIENTATION: LOWER;MID

## 2022-09-19 ASSESSMENT — LIFESTYLE VARIABLES: SMOKING_STATUS: 1

## 2022-09-19 NOTE — PROGRESS NOTES
TRANSFER - IN REPORT:    Verbal report received from Azeb Ge on [facility].  being received from PACU for routine post-op      Report consisted of patient's Situation, Background, Assessment and   Recommendations(SBAR). Information from the following report(s) Surgery Report, MAR, Recent Results, and Neuro Assessment was reviewed with the receiving nurse. Opportunity for questions and clarification was provided. Assessment completed upon patient's arrival to unit and care assumed.

## 2022-09-19 NOTE — BRIEF OP NOTE
Brief Postoperative Note      Patient: Jarad Meza. YOB: 1945  MRN: [id number]    Date of Procedure: 9/19/2022    Pre-Op Diagnosis: Intestinal obstruction, unspecified cause, unspecified whether partial or complete ([facility] [K56.609]    Post-Op Diagnosis: Same       Procedure(s):  LAPAROTOMY EXPLORATORY, REVISION OF MESH, LYSIS OF ADHESIONS    Surgeon(s):  Cole Infante MD    Assistant:  * No surgical staff found *    Anesthesia: General    Estimated Blood Loss (mL): Minimal    Complications: None    Specimens:   * No specimens in log *    Implants:  Implant Name Type Inv. Item Serial No.  Lot No. LRB No. Used Action   [address] 4CM VENTRAL POLYPR EPTFE CIR SELF EXP 52 [facility] - [id number]  [address] 4CM VENTRAL POLYPR EPTFE CIR SELF EXP [facility]- [id number] N/A 1 Implanted         Drains:   NG/OG/NJ/NE Tube Nasogastric 18 fr Left nostril (Active)   Surrounding Skin Clean, dry & intact 09/19/22 0720   Securement device Tape 09/19/22 0720   Status Suction-low intermittent 09/19/22 0720   NG/OG/NJ/NE External Measurement (cm) 56 cm 09/19/22 0720   Drainage Appearance Brown;Green 09/19/22 0720   Output (mL) 550 ml 09/19/22 0637       Findings: fascial closure sutures popped; mesh in appropriate position. Small bowel adherent to mesh.     Electronically signed by Edin Beal MD on 9/19/2022 at 3:55 PM

## 2022-09-19 NOTE — ANESTHESIA PRE PROCEDURE
Department of Anesthesiology  Preprocedure Note       Name:  Austin Bacon. Age:  78 y.o.  :  1945                                          MRN:  [id number]         Date:  2022      Surgeon: Betzaida Signs):  True Mondragon MD    Procedure: Procedure(s):  LAPAROTOMY EXPLORATORY    Medications prior to admission:   Prior to Admission medications    Medication Sig Start Date End Date Taking? Authorizing Provider   HYDROcodone-acetaminophen (NORCO) 5-325 MG per tablet Take 1 tablet by mouth every 4 hours as needed for Pain for up to 5 days. 9/15/22 9/20/22  True Mondragon MD   aspirin 81 MG EC tablet Take 81 mg by mouth in the morning. Historical Provider, MD   APPLE CIDER VINEGAR PO Take by mouth    Ar Automatic Reconciliation   GARLIC PO Take by mouth    Ar Automatic Reconciliation   HAWTHORN BERRY PO Take by mouth    Ar Automatic Reconciliation   LECITHIN PO Take by mouth    Ar Automatic Reconciliation   alirocumab (PRALUENT) 75 MG/ML SOAJ injection pen Inject 75 mg into the skin every 14 days 21   Ar Automatic Reconciliation   ascorbic acid (VITAMIN C) 500 MG tablet Take 1,000 mg by mouth    Ar Automatic Reconciliation   clopidogrel (PLAVIX) 75 MG tablet Take 75 mg by mouth in the morning. 21   Ar Automatic Reconciliation   Coenzyme Q10 10 MG CAPS Take by mouth    Ar Automatic Reconciliation   cyanocobalamin 100 MCG tablet Take 400 mcg by mouth daily    Ar Automatic Reconciliation   furosemide (LASIX) 40 MG tablet Take 40 mg by mouth daily as needed  Patient not taking: Reported on 9/15/2022 7/23/20   Ar Automatic Reconciliation   LORazepam (ATIVAN) 1 MG tablet Take 1 mg by mouth 2 times daily as needed. 19   Ar Automatic Reconciliation   losartan (COZAAR) 100 MG tablet Take 100 mg by mouth in the morning.   Patient not taking: Reported on 9/15/2022 10/26/21   Ar Automatic Reconciliation   metoprolol succinate (TOPROL XL) 25 MG extended release tablet Take 25 mg by mouth in the morning. Patient not taking: Reported on 9/15/2022 9/17/19   Ar Automatic Reconciliation   montelukast (SINGULAIR) 10 MG tablet TAKE 1 TABLET BY MOUTH EVERY DAY 6/14/21   Ar Automatic Reconciliation   nitroGLYCERIN (NITROSTAT) 0.4 MG SL tablet Place 0.4 mg under the tongue  Patient not taking: Reported on 9/15/2022 9/23/21   Ar Automatic Reconciliation   omeprazole (PRILOSEC) 20 MG delayed release capsule Take 20 mg by mouth in the morning. Patient not taking: Reported on 9/15/2022 12/28/20   Ar Automatic Reconciliation   PARoxetine (PAXIL) 20 MG tablet Take 30 mg by mouth every evening 7/29/19   Ar Automatic Reconciliation   potassium chloride (KLOR-CON M) 20 MEQ extended release tablet Take 20 mEq by mouth daily as needed 7/23/20   Ar Automatic Reconciliation   raNITIdine (ZANTAC) 150 MG tablet Take 150 mg by mouth in the morning. Patient not taking: Reported on 9/15/2022    Ar Automatic Reconciliation   thiamine 100 MG tablet Take 100 mg by mouth daily    Ar Automatic Reconciliation   valsartan (DIOVAN) 80 MG tablet Take 80 mg by mouth in the morning. Patient not taking: Reported on 9/15/2022    Ar Automatic Reconciliation   vitamin E 400 UNIT capsule Take by mouth daily    Ar Automatic Reconciliation   zaleplon (SONATA) 5 MG capsule Take 5 mg by mouth nightly as needed. Patient not taking: Reported on 9/15/2022 11/13/20   Ar Automatic Reconciliation       Current medications:    No current facility-administered medications for this visit. No current outpatient medications on file.      Facility-Administered Medications Ordered in Other Visits   Medication Dose Route Frequency Provider Last Rate Last Admin    phenol 1.4 % mouth spray 1 spray  1 spray Mouth/Throat Q2H PRN Morelia Lugo DO        lidocaine 1 % injection 1 mL  1 mL IntraDERmal Once PRN Duane Limes, MD        lactated ringers infusion   IntraVENous Continuous Duane Limes, MD        sodium chloride flush 0.9 % injection 5-40 mL 5-40 mL IntraVENous 2 times per day Neli Winkler MD        sodium chloride flush 0.9 % injection 5-40 mL  5-40 mL IntraVENous PRN Neli Winkler MD        0.9 % sodium chloride infusion   IntraVENous PRN Neli Winkler MD        midazolam PF (VERSED) injection 2 mg  2 mg IntraVENous Once PRN Neli Winkler MD        0.9 % sodium chloride infusion   IntraVENous Continuous Mazin Rockwell, APRN - CNP 75 mL/hr at 09/19/22 0355 New Bag at 09/19/22 0355    sodium chloride flush 0.9 % injection 5-40 mL  5-40 mL IntraVENous 2 times per day Mazin Rockwell, APRN - CNP   10 mL at 09/19/22 0921    sodium chloride flush 0.9 % injection 5-40 mL  5-40 mL IntraVENous PRN Mazin Rockwell, APRN - CNP        0.9 % sodium chloride infusion   IntraVENous PRN Mazin Rockwell, APRN - CNP        ondansetron (ZOFRAN-ODT) disintegrating tablet 4 mg  4 mg Oral Q8H PRN Mazin Rockwell, APRN - CNP        Or    ondansetron (ZOFRAN) injection 4 mg  4 mg IntraVENous Q6H PRN Mazin Rockwell, APRN - CNP        morphine injection 4 mg  4 mg IntraVENous Q4H PRN Mazin Rockwell, APRN - CNP   4 mg at 09/19/22 0937    morphine injection 2 mg  2 mg IntraVENous Q4H PRN Mazin Rockwell, APRN - CNP        famotidine (PEPCID) 20 mg in sodium chloride (PF) 10 mL injection  20 mg IntraVENous BID Mazin Rockwell, APRN - CNP   20 mg at 09/19/22 0920    acetaminophen (TYLENOL) tablet 650 mg  650 mg Oral Q6H PRN Mazin Rockwell, APRN - CNP        Or    acetaminophen (TYLENOL) suppository 650 mg  650 mg Rectal Q6H PRN Mazin Rockwell, APRN - CNP           Allergies:     Allergies   Allergen Reactions    Indomethacin Shortness Of Breath     Severe dizziness    Atorvastatin Other (See Comments)    Rosuvastatin Other (See Comments)     CONFUSION       Problem List:    Patient Active Problem List   Diagnosis Code    HTN (hypertension) I10    Paroxysmal atrial fibrillation (HCC) I48.0    Postoperative anemia due to acute blood loss D62    Abnormal nuclear stress test R94.39    CAD (coronary artery disease) I25.10    GERD (gastroesophageal reflux disease) K21.9    Calcification of abdominal aorta (HCC) I70.0    Anemia D64.9    Postsurgical aortocoronary bypass status Z95.1    Obesity, unspecified E66.9    Tobacco use disorder F17.200    S/P PTCA (percutaneous transluminal coronary angioplasty) Z98.61    S/P CABG (coronary artery bypass graft) Z95.1    Dyslipidemia E78.5    Coronary atherosclerosis of native coronary vessel I25.10    NEWSOME (dyspnea on exertion) R06.00    Mild episode of recurrent major depressive disorder (HCC) I84.6    Umbilical hernia without obstruction and without gangrene K42.9    Non-recurrent bilateral inguinal hernia without obstruction or gangrene K40.20    Antiplatelet or antithrombotic long-term use Z79.02    SBO (small bowel obstruction) ([facility] K56.609       Past Medical History:        Diagnosis Date    Anemia     Anxiety     Asthma     childhood    BMI 33.0-33.9,adult     CAD (coronary artery disease)     CABG/Stents; denies MI; [facility]    Calcification of abdominal aorta (HCC)     Cardiac arrhythmia     atrial fib    Chronic kidney disease     Dyslipidemia     Former smoker     GERD (gastroesophageal reflux disease)     Gout     HLD (hyperlipidemia)     Hypercholesterolemia     Hypertension     Insomnia     Liver disease     ?? growth on liver    Mild episode of recurrent major depressive disorder (HCC)     HAY (obstructive sleep apnea)     non-compliant with c-pap    Paroxysmal atrial fibrillation (HCC)     Poor historian     Prostatism     PUD (peptic ulcer disease)     HX    Stroke ([facility]     DENIES (noted 9/13/22)       Past Surgical History:        Procedure Laterality Date    CABG, ARTERIAL, THREE  4/13/11    x3    CARDIAC CATHETERIZATION  09/06/2011    CORONARY ANGIOPLASTY WITH STENT PLACEMENT  09/16/2019    patient thinks 4 total stents; last in 2019=Successful angioplasty and stenting of the vein graft to the RCA.  INGUINAL HERNIA REPAIR Bilateral 9/15/2022    LAPAROSCOPIC BILATERAL INGUINAL HERNIA REPAIR WITH MESH performed by Mariela Dill MD at [address]      right shoulder, left knee, and left foot    SINUS SURGERY      TESTICLE SURGERY      UMBILICAL HERNIA REPAIR N/A     HERNIA UMBILICAL REPAIR performed by Mariela Dill MD at [address]         Social History:    Social History     Tobacco Use    Smoking status: Former     Packs/day: 1.50     Types: Cigarettes     Quit date: 1970     Years since quittin.3    Smokeless tobacco: Former     Quit date: 2018    Tobacco comments:     Quit smoking: chews tobacco   Substance Use Topics    Alcohol use: Yes     Comment: occ                                Counseling given: Not Answered  Tobacco comments: Quit smoking: chews tobacco      Vital Signs (Current): There were no vitals filed for this visit.                                            BP Readings from Last 3 Encounters:   22 123/66   09/15/22 (!) 120/59   22 132/74       NPO Status:                                                                                 BMI:   Wt Readings from Last 3 Encounters:   22 232 lb (105.2 kg)   09/15/22 232 lb 8 oz (105.5 kg)   22 231 lb (104.8 kg)     There is no height or weight on file to calculate BMI.    CBC:   Lab Results   Component Value Date/Time    WBC 8.8 2022 05:09 AM    RBC 5.30 2022 05:09 AM    HGB 15.3 2022 05:09 AM    HCT 47.1 2022 05:09 AM    MCV 88.9 2022 05:09 AM    RDW 13.0 2022 05:09 AM     2022 05:09 AM       CMP:   Lab Results   Component Value Date/Time     2022 05:09 AM    K 3.7 2022 05:09 AM     2022 05:09 AM    CO2 28 2022 05:09 AM    BUN 19 2022 05:09 AM    CREATININE 1.20 2022 05:09 AM depression/anxiety             GI/Hepatic/Renal:   (+) GERD:, renal disease: CRI,          ROS comment: SBO. Endo/Other:    (+) : arthritis:., .                 Abdominal:             Vascular: negative vascular ROS. Other Findings:             Anesthesia Plan      general     ASA 3 - emergent     (GETA, Glidescope. Discussed abdominal wall blocks and patient was agreeable.)  Induction: intravenous and rapid sequence. Anesthetic plan and risks discussed with patient and spouse.               Post-op pain plan if not by surgeon: single peripheral nerve block            Shirin Clement MD   9/19/2022

## 2022-09-19 NOTE — ED NOTES
TRANSFER - OUT REPORT:    Verbal report given to [address] on [employer].  being transferred to [address] [place] [address] for routine progression of patient care       Report consisted of patient's Situation, Background, Assessment and   Recommendations(SBAR). Information from the following report(s) Nurse Handoff Report, ED Encounter Summary, ED SBAR, Intake/Output, MAR, Recent Results, and Quality Measures was reviewed with the receiving nurse. Lines:   Peripheral IV 09/18/22 Left Forearm (Active)   Site Assessment Clean, dry & intact 09/18/22 2000   Line Status Normal saline locked; Blood return noted;Specimen collected; Flushed 09/18/22 2000   Phlebitis Assessment No symptoms 09/18/22 2000   Infiltration Assessment 0 09/18/22 2000        Opportunity for questions and clarification was provided.       Patient transported with:  Patient-specific medications from Pharmacy and Registered Nurse       Ifrah Castillo RN  09/18/22 5626

## 2022-09-19 NOTE — PROGRESS NOTES
Pt NGT documented at 59 cm, found to be at 64, messaged NP who approved of ordering KUB to check placement.

## 2022-09-19 NOTE — PERIOP NOTE
TRANSFER - OUT REPORT:    Verbal report given to [address] [place] [address] Road on [employer].  being transferred to [address] [place] [address] for routine post-op       Report consisted of patient's Situation, Background, Assessment and   Recommendations(SBAR). Information from the following report(s) Nurse Handoff Report, Index, Adult Overview, Surgery Report, Intake/Output, and MAR was reviewed with the receiving nurse. Lines:   Peripheral IV 09/18/22 Left Forearm (Active)   Site Assessment Clean, dry & intact 09/19/22 0720   Line Status Infusing 09/19/22 0720   Line Care Connections checked and tightened 09/19/22 0720   Phlebitis Assessment No symptoms 09/19/22 0720   Infiltration Assessment 0 09/19/22 0720   Alcohol Cap Used Yes 09/19/22 0720   Dressing Status Clean, dry & intact 09/19/22 0720   Dressing Type Transparent 09/19/22 0720        Opportunity for questions and clarification was provided.       Patient transported with:  O2 @ 4lpm

## 2022-09-19 NOTE — ED PROVIDER NOTES
Emergency Department Provider Note                   PCP:                Gabe Seth MD               Age: 78 y.o. Sex: male     No diagnosis found. DISPOSITION          MDM  Number of Diagnoses or Management Options  Small bowel obstruction [facility]): new, needed workup  Diagnosis management comments: Patient presents with small bowel obstruction status post hernia repair. Patient is hemodynamically stable and does not have a surgical exam.  I doubt ischemic bowel. There is concern for a transition point at the anterior periumbilical surgical site. I discussed the case with the nurse practitioner Yandy Hernadez who said that she would discuss it with the surgeon on-call.   NG tube will be placed and patient will be admitted to the hospital.       Amount and/or Complexity of Data Reviewed  Clinical lab tests: ordered and reviewed  Tests in the radiology section of CPT®: ordered and reviewed  Tests in the medicine section of CPT®: ordered and reviewed  Discussion of test results with the performing providers: no  Decide to obtain previous medical records or to obtain history from someone other than the patient: no  Obtain history from someone other than the patient: no  Review and summarize past medical records: no  Discuss the patient with other providers: yes  Independent visualization of images, tracings, or specimens: no    Risk of Complications, Morbidity, and/or Mortality  Presenting problems: moderate  Diagnostic procedures: moderate  Management options: moderate    Patient Progress  Patient progress: stable             Orders Placed This Encounter   Procedures    CT ABDOMEN PELVIS W IV CONTRAST Additional Contrast? None    CBC with Diff    CMP    Lipase    Diet NPO    POCT Urine Dipstick    Saline lock IV        Medications - No data to display    New Prescriptions    No medications on file        Vera Fraser is a 78 y.o. male who presents to the Emergency Department with chief complaint of    Chief Complaint   Patient presents with    Abdominal Pain      Patient presents with abdominal pain. Patient feels distended and he is unable to pass gas or have a bowel movement. He did have a vomiting episode yesterday. Patient had hernia surgery on Thursday. They did not laparoscopically. Patient describes the pain as a dull ache and cramping in nature. Patient denies any fevers. He denies any other symptoms at this time. Social history is negative for smoking or drinking        Review of Systems   All other systems reviewed and are negative. Past Medical History:   Diagnosis Date    Anemia     Anxiety     Asthma     childhood    BMI 33.0-33.9,adult     CAD (coronary artery disease)     CABG/Stents; denies MI; followed by [facility]    Calcification of abdominal aorta (HCC)     Cardiac arrhythmia     atrial fib    Chronic kidney disease     Dyslipidemia     Former smoker     GERD (gastroesophageal reflux disease)     Gout     HLD (hyperlipidemia)     Hypercholesterolemia     Hypertension     Insomnia     Liver disease     ?? growth on liver    Mild episode of recurrent major depressive disorder (HCC)     HAY (obstructive sleep apnea)     non-compliant with c-pap    Paroxysmal atrial fibrillation ([address]     Poor historian     Prostatism     PUD (peptic ulcer disease)     HX    Stroke ([address]     DENIES (noted 9/13/22)        Past Surgical History:   Procedure Laterality Date    CABG, ARTERIAL, THREE  4/13/11    x3    CARDIAC CATHETERIZATION  09/06/2011    CORONARY ANGIOPLASTY WITH STENT PLACEMENT  09/16/2019    patient thinks 4 total stents; last in 2019=Successful angioplasty and stenting of the vein graft to the RCA.     INGUINAL HERNIA REPAIR Bilateral 9/15/2022    LAPAROSCOPIC BILATERAL INGUINAL HERNIA REPAIR WITH MESH performed by Priscilla Dominique MD at [address] [place] [address]      right shoulder, left knee, and left foot    SINUS SURGERY      TESTICLE SURGERY UMBILICAL HERNIA REPAIR N/A     HERNIA UMBILICAL REPAIR performed by Chris Chandler MD at [address]           Family History   Problem Relation Age of Onset    Alzheimer's Disease Mother     Stroke Father     Heart Disease Father         Social History     Socioeconomic History    Marital status:    Tobacco Use    Smoking status: Former     Packs/day: 1.50     Types: Cigarettes     Quit date: 1970     Years since quittin.3    Smokeless tobacco: Former     Quit date: 2018    Tobacco comments:     Quit smoking: chews tobacco   Vaping Use    Vaping Use: Never used   Substance and Sexual Activity    Alcohol use: Yes     Comment: occ    Drug use: No         Indomethacin, Atorvastatin, and Rosuvastatin     Previous Medications    ALIROCUMAB (PRALUENT) 75 MG/ML SOAJ INJECTION PEN    Inject 75 mg into the skin every 14 days    APPLE CIDER VINEGAR PO    Take by mouth    ASCORBIC ACID (VITAMIN C) 500 MG TABLET    Take 1,000 mg by mouth    ASPIRIN 81 MG EC TABLET    Take 81 mg by mouth in the morning. CLOPIDOGREL (PLAVIX) 75 MG TABLET    Take 75 mg by mouth in the morning. COENZYME Q10 10 MG CAPS    Take by mouth    CYANOCOBALAMIN 100 MCG TABLET    Take 400 mcg by mouth daily    FUROSEMIDE (LASIX) 40 MG TABLET    Take 40 mg by mouth daily as needed    GARLIC PO    Take by mouth    HAWTHORN BERRY PO    Take by mouth    HYDROCODONE-ACETAMINOPHEN (NORCO) 5-325 MG PER TABLET    Take 1 tablet by mouth every 4 hours as needed for Pain for up to 5 days. LECITHIN PO    Take by mouth    LORAZEPAM (ATIVAN) 1 MG TABLET    Take 1 mg by mouth 2 times daily as needed. LOSARTAN (COZAAR) 100 MG TABLET    Take 100 mg by mouth in the morning. METOPROLOL SUCCINATE (TOPROL XL) 25 MG EXTENDED RELEASE TABLET    Take 25 mg by mouth in the morning.     MONTELUKAST (SINGULAIR) 10 MG TABLET    TAKE 1 TABLET BY MOUTH EVERY DAY    NITROGLYCERIN (NITROSTAT) 0.4 MG SL TABLET    Place 0.4 mg under the tongue    OMEPRAZOLE (PRILOSEC) 20 MG DELAYED RELEASE CAPSULE    Take 20 mg by mouth in the morning. PAROXETINE (PAXIL) 20 MG TABLET    Take 30 mg by mouth every evening    POTASSIUM CHLORIDE (KLOR-CON M) 20 MEQ EXTENDED RELEASE TABLET    Take 20 mEq by mouth daily as needed    RANITIDINE (ZANTAC) 150 MG TABLET    Take 150 mg by mouth in the morning. THIAMINE 100 MG TABLET    Take 100 mg by mouth daily    VALSARTAN (DIOVAN) 80 MG TABLET    Take 80 mg by mouth in the morning. VITAMIN E 400 UNIT CAPSULE    Take by mouth daily    ZALEPLON (SONATA) 5 MG CAPSULE    Take 5 mg by mouth nightly as needed. Vitals signs and nursing note reviewed. Patient Vitals for the past 4 hrs:   Temp Pulse Resp BP SpO2   09/18/22 1946 97.9 °F (36.6 °C) 85 16 125/67 97 %          Physical Exam  Constitutional:       General: He is not in acute distress. HENT:      Head: Normocephalic and atraumatic. Right Ear: External ear normal.      Left Ear: External ear normal.      Nose: No congestion or rhinorrhea. Mouth/Throat:      Mouth: Mucous membranes are moist.   Eyes:      Extraocular Movements: Extraocular movements intact. Pupils: Pupils are equal, round, and reactive to light. Cardiovascular:      Rate and Rhythm: Normal rate and regular rhythm. Heart sounds: Normal heart sounds. Pulmonary:      Effort: Pulmonary effort is normal.      Breath sounds: Normal breath sounds. Abdominal:      General: There is distension. Palpations: Abdomen is soft. Tenderness: There is generalized abdominal tenderness. Musculoskeletal:         General: No swelling or tenderness. Normal range of motion. Cervical back: Normal range of motion. Skin:     Findings: No rash. Neurological:      General: No focal deficit present. Mental Status: He is alert and oriented to person, place, and time.    Psychiatric:         Mood and Affect: Mood normal.        Procedures    Results for orders placed or performed during the hospital encounter of 09/18/22   CBC with Diff   Result Value Ref Range    WBC 10.3 4.3 - 11.1 K/uL    RBC 4.92 4.23 - 5.6 M/uL    Hemoglobin 14.1 13.6 - 17.2 g/dL    Hematocrit 42.8 41.1 - 50.3 %    MCV 87.0 79.6 - 97.8 FL    MCH 28.7 26.1 - 32.9 PG    MCHC 32.9 31.4 - 35.0 g/dL    RDW 13.1 11.9 - 14.6 %    Platelets 904 072 - 361 K/uL    MPV 8.8 (L) 9.4 - 12.3 FL    nRBC 0.00 0.0 - 0.2 K/uL    Differential Type AUTOMATED      Seg Neutrophils 64 43 - 78 %    Lymphocytes 21 13 - 44 %    Monocytes 13 (H) 4.0 - 12.0 %    Eosinophils % 2 0.5 - 7.8 %    Basophils 1 0.0 - 2.0 %    Immature Granulocytes 0 0.0 - 5.0 %    Segs Absolute 6.6 1.7 - 8.2 K/UL    Absolute Lymph # 2.2 0.5 - 4.6 K/UL    Absolute Mono # 1.3 0.1 - 1.3 K/UL    Absolute Eos # 0.2 0.0 - 0.8 K/UL    Basophils Absolute 0.1 0.0 - 0.2 K/UL    Absolute Immature Granulocyte 0.0 0.0 - 0.5 K/UL        CT ABDOMEN PELVIS W IV CONTRAST Additional Contrast? None    (Results Pending)                       Voice dictation software was used during the making of this note. This software is not perfect and grammatical and other typographical errors may be present. This note has not been completely proofread for errors.      Kaleb Bain,   09/18/22 4890

## 2022-09-19 NOTE — H&P
History and Physical Note    Emily Witt MRN: [id number]  ZIY:[birth year]  Age:77 y.o. Chief Complaint: Abdominal Pain    Subjective: \" I feel really bloated and can't pass any gas or have bowel movement. I also vomited at home multiple times before I came to hospital.:      HPI: Emily Witt is a 78 y.o. male who we are asked by ED MD  to see for Abdominal Pain. Patient presented to ED on 9/18/2022 with abdominal pain and reports he feels distended and unable to pass flatus or have bowel movement. PT reports he did have multiple episodes of vomiting on 9/17/2022. Patient had a Laparoscopic Bilateral inguinal hernia repair with mesh and umbilical hernia repair with mesh done on 9/15/2022 per Dr. Cara Storey. Patient denies having any fevers or chills. Patient reports he has been taking the Norco pain tablets for pain but has also been taking Colace and Miralax. He states he has not had a bowel movement since Wednesday of last week. Patient had CT scan Abdomen/Pelvis done 9/18/2022 which shows: FINDINGS: Partially included lung bases demonstrate no acute disease. Abdomen:  No suspicious lesions in the liver or spleen, benign liver cyst again   noted. Gallbladder, adrenal glands and pancreas appear unremarkable. There is normal enhancement of the kidneys, no hydronephrosis. No interval lymphadenopathy. Normal right lower quadrant appendix. No free air, focal inflammatory changes or fluid collections in the abdomen. Aorta   normal caliber. Scattered calcifications again seen. There are multiple mildly dilated small bowel loops in the midabdomen measuring up to 4 cm diameter, with suggestion of a transition point anteriorly near midline abutting a periumbilical surgical scar (coronal 19, axial 65-66). Tortuous large bowel, diverticulosis, moderately prominent stool.  GI evaluation is limited without oral contrast. Scattered small amounts of gas in the Patient in TCU. CC will monitor for discharge and follow up.  Intro letter sent for future follow up if needed.    Milad Eason RN  Clinic Care Coordinator  [phone] or [phone]       anterior abdominal wall which could be postoperative. Pelvis: Trace nonspecific presacral fluid. No focal fluid collections in the   pelvis. Small amounts of extraperitoneal gas could be postoperative. No   lymphadenopathy or mass. Postoperative changes noted in the bilateral inguinal canal compatible with reported hernia repair, small amount of nonspecific  gas in the left scrotum, chronic lipomatous hypertrophy again suggested of the bilateral inguinal canal.  No herniated bowel. Bones: No acute osseous lesions. Impression   1. Possible small bowel obstruction. 2.  Surgical changes in the abdominal wall and bilateral inguinal regions. 3.  Trace free fluid. Patient had NG tube placed to LIWS and made NPO for now. Past Medical History:   Diagnosis Date    Anemia     Anxiety     Asthma     childhood    BMI 33.0-33.9,adult     CAD (coronary artery disease)     CABG/Stents; denies MI; followed by [facility]    Calcification of abdominal aorta (HCC)     Cardiac arrhythmia     atrial fib    Chronic kidney disease     Dyslipidemia     Former smoker     GERD (gastroesophageal reflux disease)     Gout     HLD (hyperlipidemia)     Hypercholesterolemia     Hypertension     Insomnia     Liver disease     ?? growth on liver    Mild episode of recurrent major depressive disorder (HCC)     HAY (obstructive sleep apnea)     non-compliant with c-pap    Paroxysmal atrial fibrillation ([address]     Poor historian     Prostatism     PUD (peptic ulcer disease)     HX    Stroke ([address]     DENIES (noted 9/13/22)     Past Surgical History:   Procedure Laterality Date    CABG, ARTERIAL, THREE  4/13/11    x3    CARDIAC CATHETERIZATION  09/06/2011    CORONARY ANGIOPLASTY WITH STENT PLACEMENT  09/16/2019    patient thinks 4 total stents; last in 2019=Successful angioplasty and stenting of the vein graft to the RCA.     INGUINAL HERNIA REPAIR Bilateral 9/15/2022    LAPAROSCOPIC BILATERAL INGUINAL HERNIA REPAIR WITH MESH performed by Jettie Gottron, MD at [facility]      right shoulder, left knee, and left foot    SINUS SURGERY      TESTICLE SURGERY      UMBILICAL HERNIA REPAIR N/A     HERNIA UMBILICAL REPAIR performed by Jettie Gottron, MD at [address] [facility]       Current Facility-Administered Medications   Medication Dose Route Frequency    phenol 1.4 % mouth spray 1 spray  1 spray Mouth/Throat Q2H PRN    0.9 % sodium chloride infusion   IntraVENous Continuous    sodium chloride flush 0.9 % injection 5-40 mL  5-40 mL IntraVENous 2 times per day    sodium chloride flush 0.9 % injection 5-40 mL  5-40 mL IntraVENous PRN    0.9 % sodium chloride infusion   IntraVENous PRN    ondansetron (ZOFRAN-ODT) disintegrating tablet 4 mg  4 mg Oral Q8H PRN    Or    ondansetron (ZOFRAN) injection 4 mg  4 mg IntraVENous Q6H PRN    morphine injection 4 mg  4 mg IntraVENous Q4H PRN    morphine injection 2 mg  2 mg IntraVENous Q4H PRN    famotidine (PEPCID) 20 mg in sodium chloride (PF) 10 mL injection  20 mg IntraVENous BID    acetaminophen (TYLENOL) tablet 650 mg  650 mg Oral Q6H PRN    Or    acetaminophen (TYLENOL) suppository 650 mg  650 mg Rectal Q6H PRN     ALLERGIES:  Indomethacin, Atorvastatin, and Rosuvastatin      Social History     Socioeconomic History    Marital status:    Tobacco Use    Smoking status: Former     Packs/day: 1.50     Types: Cigarettes     Quit date: 1970     Years since quittin.3    Smokeless tobacco: Former     Quit date: 2018    Tobacco comments:     Quit smoking: chews tobacco   Vaping Use    Vaping Use: Never used   Substance and Sexual Activity    Alcohol use: Yes     Comment: occ    Drug use: No       Family History   Problem Relation Age of Onset    Alzheimer's Disease Mother     Stroke Father     Heart Disease Father      OBJECTIVE:   Review of Systems   Constitutional: Negative. HENT: Negative. Eyes: Negative. Respiratory: Negative. Cardiovascular: Negative. Gastrointestinal: Positive for abdominal pain, nausea, vomiting , obstipation and constipation. Endocrine: Negative. Genitourinary:  Negative for dysuria or pain. Musculoskeletal: Negative. Skin: Negative. Allergic/Immunologic: Negative. Neurological: Negative. Hematological: Negative. Psychiatric/Behavioral: Negative. ROS: The patient has no difficulty with chest pain or shortness of breath. No fever or chills. Comprehensive review of systems was otherwise unremarkable except as noted above. /65   Pulse 88   Temp 98.8 °F (37.1 °C) (Oral)   Resp 17   Ht 5' 10\" (1.778 m)   Wt 232 lb (105.2 kg)   SpO2 94%   BMI 33.29 kg/m²     Physical Exam  Vitals and nursing note reviewed  Constitutional:       General: He is not in acute distress. HENT:      Head: Normocephalic and atraumatic. Right Ear: External ear normal.      Left Ear: External ear normal.      Nose: No congestion or rhinorrhea. NG tube to LIWS     Mouth/Throat:      Mouth: Mucous membranes are moist.   Eyes:      Extraocular Movements: Extraocular movements intact. Pupils: Pupils are equal, round, and reactive to light. Cardiovascular:      Rate and Rhythm: Normal rate and regular rhythm. Heart sounds: Normal heart sounds. Pulmonary:      Effort: Pulmonary effort is normal.      Breath sounds: Normal breath sounds. Abdominal:      General: There is moderate distension/Tympanitic     Palpations: Abdomen is obese. Tenderness: There is generalized abdominal tenderness. Hypoactive Bowel sounds noted. Appropriate tenderness noted to Incisional areas with steri-strips intact with old dried blood noted. Post operative Ecchymosis noted to area below umbilicus with no hematoma. Musculoskeletal:         General: No swelling or tenderness. Normal range of motion. Cervical back: Normal range of motion.    Skin: Findings: No rash. Neurological:      General: No focal deficit present. Mental Status: He is alert and oriented to person, place, and time. Psychiatric:         Mood and Affect: Mood normal.       Recent vitals (if inpt):  Patient Vitals for the past 24 hrs:   BP Temp Temp src Pulse Resp SpO2 Height Weight   09/19/22 0727 128/65 98.8 °F (37.1 °C) Oral 88 17 94 % -- --   09/19/22 0306 124/69 99.3 °F (37.4 °C) Oral 87 18 98 % -- --   09/19/22 0102 (!) 149/79 97.9 °F (36.6 °C) Oral 82 18 94 % -- --   09/18/22 1946 125/67 97.9 °F (36.6 °C) Oral 85 16 97 % 5' 10\" (1.778 m) 232 lb (105.2 kg)       Labs:  Recent Labs     09/19/22  0509   WBC 8.8   HGB 15.3      *   K 3.7   *   CO2 28   BUN 19   ALT 28     Imaging:    CT of the Abdomen and Pelvis with contrast 9/18/2022       CLINICAL INDICATION:  4 days of worsening generalized moderate abdominal   distention and pain, constipation, vomiting. Reported recent inguinal hernia   repair surgery. COMPARISON: 8/1/2022, 3/7/2019       TECHNIQUE: Dose reduction technique used: Automated exposure Control and/or adjustment of mA and kV according to patient size. Multiple axial images were obtained through the abdomen and pelvis after intravenous injection of 125cc of isovue 370. No oral contrast given per request, limiting evaluation of GI tract and surrounding structures. Coronal reformatted images obtained for further evaluation of organs. FINDINGS: Partially included lung bases demonstrate no acute disease. Abdomen:  No suspicious lesions in the liver or spleen, benign liver cyst again   noted. Gallbladder, adrenal glands and pancreas appear unremarkable. There is normal enhancement of the kidneys, no hydronephrosis. No interval lymphadenopathy. Normal right lower quadrant appendix. No free   air, focal inflammatory changes or fluid collections in the abdomen. Aorta   normal caliber.   Scattered calcifications again seen. There are multiple mildly dilated small bowel loops in the midabdomen measuring   up to 4 cm diameter, with suggestion of a transition point anteriorly near   midline abutting a periumbilical surgical scar (coronal 19, axial 65-66). Tortuous large bowel, diverticulosis, moderately prominent stool. GI evaluation   is limited without oral contrast. Scattered small amounts of gas in the anterior   abdominal wall which could be postoperative. Pelvis: Trace nonspecific presacral fluid. No focal fluid collections in the   pelvis. Small amounts of extraperitoneal gas could be postoperative. No   lymphadenopathy or mass. Postoperative changes noted in the bilateral inguinal   canal compatible with reported hernia repair, small amount of nonspecific gas in   the left scrotum, chronic lipomatous hypertrophy again suggested of the   bilateral inguinal canal.  No herniated bowel. Bones: No acute osseous lesions. Impression   1. Possible small bowel obstruction. 2.  Surgical changes in the abdominal wall and bilateral inguinal regions. 3.  Trace free fluid. GI details are limited without oral contrast.         EXAM: Abdomen x-ray. 9/19/2022 @ 00:20am       INDICATION: NG tube placement. COMPARISON: Yesterday's CT abdomen. TECHNIQUE: Single frontal view. FINDINGS: The enteric tube tip lies in the proximal stomach, with its distal   side port in the region of the gastroesophageal junction. Advancement by 3-4 cm   is recommended to ensure proper function. No free air is identified. Distended   small bowel loops have not significantly changed. I reviewed recent labs and recent radiologic studies. ASSESSMENT:  Principal Problem:    SBO (small bowel obstruction) (HCC)  Resolved Problems:    * No resolved hospital problems.  *       PLAN:  -Further input per attending surgeon  -NPO/IVF's  -NG tube to LIWS continue  -Serial Abdominal exams  -Antiemetic PRN IV  -SCD for DVT prophylaxis   -Discuss with Attending MD CT scan done on 9/19/2022    Signed:  JEANA Willoughby - NP

## 2022-09-19 NOTE — ANESTHESIA POSTPROCEDURE EVALUATION
Department of Anesthesiology  Postprocedure Note    Patient: Olga Ortiz MRN: [id number]  YOB: 1945  Date of evaluation: 9/19/2022      Procedure Summary     Date: 09/19/22 [place] [facility] [place] [facility] [place]    Anesthesia Start: 0598 Anesthesia Stop: 1058    Procedure: LAPAROTOMY EXPLORATORY, REVISION OF MESH, LYSIS OF ADHESIONS (Abdomen) Diagnosis:       Intestinal obstruction, unspecified cause, unspecified whether partial or complete ([facility])      (Intestinal obstruction, unspecified cause, unspecified whether partial or complete ([facility] [K56.609])    Surgeons: Norma Fitzgerald MD Responsible Provider: Ivett Eugene MD    Anesthesia Type: General ASA Status: 3 - Emergent          Anesthesia Type: General    Damon Phase I: Damon Score: 6    Damon Phase II:        Anesthesia Post Evaluation    Patient location during evaluation: PACU  Patient participation: complete - patient participated  Level of consciousness: awake and alert  Airway patency: patent  Nausea: well controlled. Complications: no  Cardiovascular status: acceptable.   Respiratory status: acceptable  Hydration status: stable

## 2022-09-19 NOTE — CARE COORDINATION
SW met with patient who confirmed demographic information, states he lives with his wife and two adult daughters who live locally. Patient is seen by Dr. Allie Fagan for primary care and is current. Patient does not use assistive devices to ambulate, denies any falls, and is independent in his ADLs. Patient reports feeling weaker since his surgery. SW to continue to follow. May benefit from PT/OT evaluations once medically appropriate.      ASSESSMENT NOTE    Attending Physician: Cheri Briggs MD  Admit Problem: Small bowel obstruction ([facility] [Y03.037]  SBO (small bowel obstruction) ([facility] [K56.609]  Date/Time of Admission: 9/18/2022  7:48 PM  Problem List:  Patient Active Problem List   Diagnosis    HTN (hypertension)    Paroxysmal atrial fibrillation (HCC)    Postoperative anemia due to acute blood loss    Abnormal nuclear stress test    CAD (coronary artery disease)    GERD (gastroesophageal reflux disease)    Calcification of abdominal aorta (HCC)    Anemia    Postsurgical aortocoronary bypass status    Obesity, unspecified    Tobacco use disorder    S/P PTCA (percutaneous transluminal coronary angioplasty)    S/P CABG (coronary artery bypass graft)    Dyslipidemia    Coronary atherosclerosis of native coronary vessel    NEWSOME (dyspnea on exertion)    Mild episode of recurrent major depressive disorder ([facility]    Umbilical hernia without obstruction and without gangrene    Non-recurrent bilateral inguinal hernia without obstruction or gangrene    Antiplatelet or antithrombotic long-term use    SBO (small bowel obstruction) [facility])       Service Assessment  Patient Orientation Alert and Oriented, Person, Place, Situation, Self   Cognition Alert   History Provided By Patient   Primary Caregiver Self   Accompanied By/Relationship     Support Systems Spouse/Significant Other   Patient's Healthcare Decision Maker is:     PCP Verified by CM Yes   Last Visit to PCP     Prior Functional Level Independent in ADLs/IADLs   Current Functional Level Assistance with the following:, Bathing, Dressing   Can patient return to prior living arrangement Yes   Ability to make needs known: Good   Family able to assist with home care needs: Yes   Would you like for me to discuss the discharge plan with any other family members/significant others, and if so, who? Financial Resources     Community Resources     CM/SW Referral       Social/Functional History  Lives With Spouse   Type of [address] [place] [address] Access     Entrance Stairs - Number of Steps     Entrance Stairs - Rails     Bathroom Shower/Tub     Bathroom Toilet     Bathroom Equipment     Bathroom Accessibility     Home Equipment     Receives Help From Family   ADL Assistance Independent   Bath     Dressing     Grooming     Feeding     Toileting     [address] Work     Driving     Shopping          Other (Comment)     Homemaking Responsibilities     Meal Prep Responsibility     Laundry Responsibility     Cleaning Responsibility     Bill Paying/Finance Responsibility     Shopping Responsibility     Dependent Care Responsibility     Health Care Management     Other (Comment)     Ambulation Assistance     Transfer Assistance     Active  Yes   Patient's  Info     Mode of Transportation Car   Education     Occupation Retired   Type of Occupation       Discharge Planning   Type of Residence     Living Arrangements Spouse/Significant Other   [place] Spouse/Significant Other   Current Services Prior To Admission     Potential Assistance Needed     DME     DME     DME Ordered? Potential Assistance Purchasing Medications     Meds-to-Beds: Does the patient want to have any new prescriptions delivered to bedside prior to discharge?      Type of Home Care Services     Patient expects to be discharged to: House   Follow Up Appointment: Best Day/Time     One/Two Story Residence:     # of Interior Steps     Height of Each Step (in)     [employer] Available     History of Falls? No     Services At/After Discharge  Transition of Care Consult (CM Consult): Internal Home Health     Internal Hospice     Reason Outside Agency [address]     Partner SNF     Reason Why Partner SNF Not Chosen     Internal Comfort Care     Reason Outside [address] Discharge     [address] Provided? Mode of Transport at [facility] Time of Discharge     Confirm Follow Up Transport       Condition of Participation: Discharge Planning  The plan for Transition of Care is related to the following treatment goals: The Patient and/or Patient Representative was provided with a Choice of Provider? Name of the Patient Representative who was provided with the Choice of Provider and agrees with the Discharge Plan? The Patient and/or Patient Representative Agree with the Discharge Plan? Freedom of Choice list was provided with basic dialogue that supports the individualized plan of care/goals, treatment preferences, and shares the quality data associated with the providers?        Documentation for Discharge Appeal  Discharge Appealed by     Date notified by [facility] of appeal request:     Time notified by [facility] of appeal request:     Detailed Notice of Discharge given to:     Date Notice of Discharge given:     Time Notice of Discharge given:     Date records sent to [facility]     Time records sent to Mercedes Siegel     Date Notified of Outcome     Time Notified of Outcome     Outcome of appeal           Jono Weber RIDGE 09/19/22 1:41 PM      Kaylin Urias, [address] Work   [address] [facility]

## 2022-09-19 NOTE — OP NOTE
Operative Report    Patient: Harley Nuñez MRN: [id number]     YOB: 1945  Age: 78 y.o. Sex: male       Date of Surgery: 9/18/2022 - 9/19/2022     Preoperative Diagnosis: Intestinal obstruction, unspecified cause, unspecified whether partial or complete ([facility] [K56.609]     Postoperative Diagnosis: * No post-op diagnosis entered *     Procedure:  LYSIS OF ADHESIONS, REVISION OF MESH    Anesthesia: General     Complications: none    EBL: minimal    Indications:  As outlined in History and Physical.    Procedure Details   Informed consent was obtained and the patient was brought to the operating room and placed on the table in a supine position. After successful induction of anesthesia, the abdomen was prepped and draped in the standard fashion. The recent infraumbilical incision was reopened to include the subcutaneous sutures and scant serous fluid drained. Exploration of the site revealed rupture of all fascial closure sutures- both the vicryl used to closed the laparoscopic 10mm trocar site and the several prolene sutures closing the umbilical fascia over the mesh. Of note, at the original operation 9/15, the patient also ruptured the prolene sutures during emergence and required immediate revision. The remaining prolene anchoring the mesh was cut. Digital exploration revealed the antimesenteric border of a loop of bowel visible between the peritoneum and the mesh at the 2:00 position. Limited visualization precluded confirming this was the site of obstruction. This surface of the mesh was swept digitally to clear it and it was extracted; it was intact but was noted to have a moderate fold/deformity of the stabilization ring. Digital exploration of the anterior abdominal wall revealed possible adhesions a few centimeters inferiorly and this was carefully lysed, which required nominal enlargement of the umbilical fascial defect.   After confirming hemostasis, a new medium Ventralex patch was placed through the umbilical defect and the  positioning straps were then trimmed and sutured to the sides of the defect with 2-0 Prolene. This was also used to close the defect transversely over the mesh. Prolene was also used to close the additional laparoscopic trocar site inferiorly. The area was then irrigated and confirmed hemostatic and infiltrated with additional local.  The umbilical skin was then tacked to the fascia with 3-0 vicryl to recreate the umbilicus,   and then closed with subcuticular 4-0 Vicryl. Steri-Strips, a tonsil sponge,  telfa, and a tegaderm dressing were applied as a pressure dressing. Almost immediately after placing the dressing, the patient began to cough on the tube and, as happened on 9/15, audible rupture of the sutures was noted. The dressing was removed and the incision reopened revealing he had, indeed, ruptured all fascial closure prolene sutures; about half were torn, and several were unraveled despite 10 knots. The closure was thus repeated using #1 Ethibond suture at both fascial sites. The closure was then repeated as above. The patient tolerated the procedure well and was awakened from anesthesia and taken to recovery in satisfactory condition. Sponge, needle, and instrument counts were correct as reported to me.     Malcom Bradshaw MD  September 19, 2022

## 2022-09-20 ENCOUNTER — APPOINTMENT (OUTPATIENT)
Dept: GENERAL RADIOLOGY | Age: 77
DRG: 352 | End: 2022-09-20
Payer: MEDICARE

## 2022-09-20 PROCEDURE — 2580000003 HC RX 258: Performed by: NURSE PRACTITIONER

## 2022-09-20 PROCEDURE — 6370000000 HC RX 637 (ALT 250 FOR IP): Performed by: SURGERY

## 2022-09-20 PROCEDURE — 74019 RADEX ABDOMEN 2 VIEWS: CPT

## 2022-09-20 PROCEDURE — 6360000002 HC RX W HCPCS: Performed by: SURGERY

## 2022-09-20 PROCEDURE — A4216 STERILE WATER/SALINE, 10 ML: HCPCS | Performed by: NURSE PRACTITIONER

## 2022-09-20 PROCEDURE — 1100000000 HC RM PRIVATE

## 2022-09-20 PROCEDURE — 2500000003 HC RX 250 WO HCPCS: Performed by: NURSE PRACTITIONER

## 2022-09-20 RX ORDER — PROCHLORPERAZINE EDISYLATE 5 MG/ML
10 INJECTION INTRAMUSCULAR; INTRAVENOUS EVERY 6 HOURS PRN
Status: DISCONTINUED | OUTPATIENT
Start: 2022-09-20 | End: 2022-09-23 | Stop reason: HOSPADM

## 2022-09-20 RX ORDER — SIMETHICONE 80 MG
80 TABLET,CHEWABLE ORAL 4 TIMES DAILY PRN
Status: DISCONTINUED | OUTPATIENT
Start: 2022-09-20 | End: 2022-09-23 | Stop reason: HOSPADM

## 2022-09-20 RX ORDER — METOCLOPRAMIDE HYDROCHLORIDE 5 MG/ML
5 INJECTION INTRAMUSCULAR; INTRAVENOUS EVERY 6 HOURS
Status: DISCONTINUED | OUTPATIENT
Start: 2022-09-20 | End: 2022-09-23 | Stop reason: HOSPADM

## 2022-09-20 RX ADMIN — SODIUM CHLORIDE, PRESERVATIVE FREE 20 MG: 5 INJECTION INTRAVENOUS at 08:21

## 2022-09-20 RX ADMIN — SODIUM CHLORIDE, PRESERVATIVE FREE 10 ML: 5 INJECTION INTRAVENOUS at 20:15

## 2022-09-20 RX ADMIN — HYDROMORPHONE HYDROCHLORIDE 0.5 MG: 1 INJECTION, SOLUTION INTRAMUSCULAR; INTRAVENOUS; SUBCUTANEOUS at 17:51

## 2022-09-20 RX ADMIN — METOCLOPRAMIDE 5 MG: 5 INJECTION, SOLUTION INTRAMUSCULAR; INTRAVENOUS at 13:26

## 2022-09-20 RX ADMIN — HYDROMORPHONE HYDROCHLORIDE 0.5 MG: 1 INJECTION, SOLUTION INTRAMUSCULAR; INTRAVENOUS; SUBCUTANEOUS at 01:09

## 2022-09-20 RX ADMIN — SODIUM CHLORIDE, PRESERVATIVE FREE 20 MG: 5 INJECTION INTRAVENOUS at 20:14

## 2022-09-20 RX ADMIN — METOCLOPRAMIDE 5 MG: 5 INJECTION, SOLUTION INTRAMUSCULAR; INTRAVENOUS at 20:15

## 2022-09-20 RX ADMIN — PAROXETINE 30 MG: 20 TABLET, FILM COATED ORAL at 17:37

## 2022-09-20 ASSESSMENT — PAIN DESCRIPTION - DESCRIPTORS
DESCRIPTORS: ACHING
DESCRIPTORS: CRAMPING

## 2022-09-20 ASSESSMENT — PAIN SCALES - GENERAL
PAINLEVEL_OUTOF10: 3
PAINLEVEL_OUTOF10: 7
PAINLEVEL_OUTOF10: 5

## 2022-09-20 ASSESSMENT — PAIN DESCRIPTION - LOCATION
LOCATION: ABDOMEN
LOCATION: ABDOMEN

## 2022-09-20 ASSESSMENT — PAIN DESCRIPTION - ORIENTATION: ORIENTATION: MID

## 2022-09-20 NOTE — PROGRESS NOTES
Noted dressing at umbilicus was saturated. Notified NP on call. Instructed to change dressing. Placed non adhesive dressing and Tegaderm over incision. Steri strips intact with small amount of old drainage noted. Pt tolerated well.

## 2022-09-20 NOTE — PROGRESS NOTES
General Surgery Progress Note    9/20/2022    Admit Date: 9/18/2022      Post Op Day 1:  EXPLORATORY LAPAROTOMY , REVISION OF MESH and LYSIS OF ADHESIONS done per Dr. Tracy Medina    Chief Complaint: Abdominal Pain     Subjective: \" I feel really bloated and can't pass any gas or have bowel movement. I also vomited at home multiple times before I came to hospital.:        HPI: Sherri Lentz is a 78 y.o. male who we are asked by ED MD  to see for Abdominal Pain. Patient presented to ED on 9/18/2022 with abdominal pain and reports he feels distended and unable to pass flatus or have bowel movement. PT reports he did have multiple episodes of vomiting on 9/17/2022. Patient had a Laparoscopic Bilateral inguinal hernia repair with mesh and umbilical hernia repair with mesh done on 9/15/2022 per Dr. Tracy Medina. Patient denies having any fevers or chills. Patient reports he has been taking the Norco pain tablets for pain but has also been taking Colace and Miralax. He states he has not had a bowel movement since Wednesday of last week. CT scan of Abdomen/Pelvis done  on admit showed SBO at umbilicus. Mesh appears to be in place and do not see that bowel is between mesh and peritoneum. On 9/19/2022: Dr. Tracy Medina saw patient and reviewed CT scan  and as per his notes state \" Observation vs exploration discussed w/pt and wife. Observation likely has lower probability of success compared to typical adhesive SBO due to the immediate postop nature of the inflammatory adhesion. As such, exploration recommended and pt agrees\"     Procedure reviewed  Risks reviewed include anesthetic risk, bleeding, infection, bowel injury, hernia recurrence. Subjective:     Patient sitting on toilet on am rounds. Pt reports he felt like he had to [facility] a bowel movement\" but patient reports he is not passing flatus nor did he have bowel movement. Pt's NG tube has been clamped since MN per order.   PT denies any nausea or vomiting, but patient states he feels \"really bloated. \"      Pt will be given a trial of clear liquids with NG tube clamped. Patient has been instructed to ambulate in kennedy with assistance. Nurse from night shift changed the dressing at his umbilicus due to it being saturated with drainage. She reported no active bleeding noted to site at time of dressing change. Objective:     /68   Pulse 82   Temp 97.9 °F (36.6 °C) (Oral)   Resp 18   Ht 5' 10\" (1.778 m)   Wt 232 lb (105.2 kg)   SpO2 97%   BMI 33.29 kg/m²       Intake/Output Summary (Last 24 hours) at 9/20/2022 0907  Last data filed at 9/20/2022 5534  Gross per 24 hour   Intake 1100 ml   Output 775 ml   Net 325 ml      Physical Exam  Vitals and nursing note reviewed  Constitutional:       General: He is not in acute distress. HENT:      Head: Normocephalic and atraumatic. Right Ear: External ear normal.      Left Ear: External ear normal.      Nose: No congestion or rhinorrhea. NG tube clamped. Mouth/Throat:      Mouth: Mucous membranes are moist.   Eyes:      Extraocular Movements: Extraocular movements intact. Pupils: Pupils are equal, round, and reactive to light. Cardiovascular:      Rate and Rhythm: Normal rate and regular rhythm. Heart sounds: Normal heart sounds. Pulmonary:      Effort: Pulmonary effort is normal.      Breath sounds: Normal breath sounds. Abdominal:      General: There is moderate distension/Tympanitic with hypoactive bowel sounds noted x 4 quads. Tenderness: Appropriate tenderness noted to Umbilical Incisional area with steri-strips intact with old dried blood noted. Post operative Ecchymosis noted to area below umbilicus with no hematoma. Dressing to umbilicus assessed and no active drainage noted. Small area of excoriation noted to left incision area possibly due to tape. Musculoskeletal:         General: No swelling or tenderness. Normal range of motion.       Cervical back: Normal range of motion. Skin:     Findings: No rash. Neurological:      General: No focal deficit present. Mental Status: He is alert and oriented to person, place, and time. Psychiatric:         Mood and Affect: Mood normal.          Data Review  No results found for this or any previous visit (from the past 24 hour(s)). ASSESSMENT:  S/P Day 1: EXPLORATORY LAPAROTOMY , REVISION OF MESH and LYSIS OF ADHESIONS   Principal Problem:    Small bowel obstruction (HCC)  Resolved Problems:    * No resolved hospital problems.  *      PLAN:  -Attempt trial of clear liquids  -Ambulate in kennedy with assistance  -Physical therapy evaluation  -IVF's for now  -NG tube clamped for now and will reassess later today to see if patient tolerated clear liquids.   -Home meds with clear liquids  -Discuss plan of care with Dr. Emre STORM-BC

## 2022-09-20 NOTE — PROGRESS NOTES
Spoke to MD Mullen Less in person about pt drainage and amounts of dsg changes.  Said no tegaderm on dsgs just gauze to prevent trapping of moisture, and frequency of dsg changes are not of concern

## 2022-09-20 NOTE — PROGRESS NOTES
Pt up to [facility] for attempt at [facility]. Pt pulled out NGT, NP Mayco Patterson is aware. Pt not having N/V or discomfort. Pt soaked through dsg, replaced with non adherent pad and large tegaderm. NP aware, steri strips were wet but still adhered appropriately.

## 2022-09-20 NOTE — PROGRESS NOTES
I came to room to examine patient as nurse reports patient pulled his NG tube out when attempting to get out of bed to bedside commode. Patient's abdominal dressing at that time was noted to be soaked and was replaced at that time per nurse. No draining noted on dressing upon exam currently. Patient is resting in bed and denies any nausea or vomiting and no flatus/BM. Abdomen appears more distended at this time. I have ordered a STAT 2 view abdominal xray. I have discussed plan of care with patient and will notify Dr. Rosario Lau once x ray has been resulted.

## 2022-09-21 PROCEDURE — 1100000000 HC RM PRIVATE

## 2022-09-21 PROCEDURE — 6360000002 HC RX W HCPCS: Performed by: SURGERY

## 2022-09-21 PROCEDURE — 6360000002 HC RX W HCPCS: Performed by: NURSE PRACTITIONER

## 2022-09-21 PROCEDURE — 6370000000 HC RX 637 (ALT 250 FOR IP): Performed by: NURSE PRACTITIONER

## 2022-09-21 PROCEDURE — 2580000003 HC RX 258: Performed by: SURGERY

## 2022-09-21 PROCEDURE — A4216 STERILE WATER/SALINE, 10 ML: HCPCS | Performed by: NURSE PRACTITIONER

## 2022-09-21 PROCEDURE — 6370000000 HC RX 637 (ALT 250 FOR IP): Performed by: SURGERY

## 2022-09-21 PROCEDURE — 2580000003 HC RX 258: Performed by: NURSE PRACTITIONER

## 2022-09-21 PROCEDURE — 2500000003 HC RX 250 WO HCPCS: Performed by: NURSE PRACTITIONER

## 2022-09-21 RX ORDER — BISACODYL 10 MG
10 SUPPOSITORY, RECTAL RECTAL DAILY
Status: DISCONTINUED | OUTPATIENT
Start: 2022-09-21 | End: 2022-09-23 | Stop reason: HOSPADM

## 2022-09-21 RX ORDER — DOCUSATE SODIUM 100 MG/1
100 CAPSULE, LIQUID FILLED ORAL DAILY
Status: DISCONTINUED | OUTPATIENT
Start: 2022-09-21 | End: 2022-09-22

## 2022-09-21 RX ORDER — ASPIRIN 81 MG/1
81 TABLET, CHEWABLE ORAL DAILY
Status: DISCONTINUED | OUTPATIENT
Start: 2022-09-21 | End: 2022-09-23 | Stop reason: HOSPADM

## 2022-09-21 RX ADMIN — SODIUM CHLORIDE: 9 INJECTION, SOLUTION INTRAVENOUS at 16:02

## 2022-09-21 RX ADMIN — METOCLOPRAMIDE 5 MG: 5 INJECTION, SOLUTION INTRAMUSCULAR; INTRAVENOUS at 09:40

## 2022-09-21 RX ADMIN — METOCLOPRAMIDE 5 MG: 5 INJECTION, SOLUTION INTRAMUSCULAR; INTRAVENOUS at 20:18

## 2022-09-21 RX ADMIN — BISACODYL 10 MG: 10 SUPPOSITORY RECTAL at 13:59

## 2022-09-21 RX ADMIN — MONTELUKAST 10 MG: 10 TABLET, FILM COATED ORAL at 09:38

## 2022-09-21 RX ADMIN — DOCUSATE SODIUM 100 MG: 100 CAPSULE ORAL at 13:59

## 2022-09-21 RX ADMIN — SODIUM CHLORIDE, PRESERVATIVE FREE 20 MG: 5 INJECTION INTRAVENOUS at 09:43

## 2022-09-21 RX ADMIN — SODIUM CHLORIDE, PRESERVATIVE FREE 10 ML: 5 INJECTION INTRAVENOUS at 09:51

## 2022-09-21 RX ADMIN — METOCLOPRAMIDE 5 MG: 5 INJECTION, SOLUTION INTRAMUSCULAR; INTRAVENOUS at 13:56

## 2022-09-21 RX ADMIN — METOPROLOL SUCCINATE 25 MG: 25 TABLET, EXTENDED RELEASE ORAL at 09:38

## 2022-09-21 RX ADMIN — PAROXETINE 30 MG: 20 TABLET, FILM COATED ORAL at 17:19

## 2022-09-21 RX ADMIN — PROCHLORPERAZINE EDISYLATE 10 MG: 5 INJECTION INTRAMUSCULAR; INTRAVENOUS at 19:28

## 2022-09-21 RX ADMIN — CLOPIDOGREL BISULFATE 75 MG: 75 TABLET ORAL at 09:38

## 2022-09-21 RX ADMIN — METOCLOPRAMIDE 5 MG: 5 INJECTION, SOLUTION INTRAMUSCULAR; INTRAVENOUS at 02:30

## 2022-09-21 RX ADMIN — ASPIRIN 81 MG: 81 TABLET, CHEWABLE ORAL at 13:59

## 2022-09-21 RX ADMIN — ACETAMINOPHEN 650 MG: 325 TABLET, FILM COATED ORAL at 19:29

## 2022-09-21 RX ADMIN — SODIUM CHLORIDE, PRESERVATIVE FREE 20 MG: 5 INJECTION INTRAVENOUS at 20:19

## 2022-09-21 RX ADMIN — LORAZEPAM 1 MG: 1 TABLET ORAL at 19:29

## 2022-09-21 ASSESSMENT — PAIN DESCRIPTION - DESCRIPTORS: DESCRIPTORS: ACHING

## 2022-09-21 ASSESSMENT — PAIN DESCRIPTION - LOCATION: LOCATION: ABDOMEN

## 2022-09-21 ASSESSMENT — PAIN SCALES - GENERAL
PAINLEVEL_OUTOF10: 3
PAINLEVEL_OUTOF10: 0

## 2022-09-21 ASSESSMENT — PAIN DESCRIPTION - ORIENTATION: ORIENTATION: MID

## 2022-09-21 NOTE — PROGRESS NOTES
Upon entering room to check on patient, patient was standing up by bed with family member present. PT reports he was nauseated and \"felt like he had to vomit. \"  Pt had serous drainage noted on gown from his umbilical incisional site. No active bleeding noted. Patient assisted to bed and dressing applied to incisional area. I notified nurse for patient to receive antiemetic. Patient instructed to not attempt clear liquids at this time as his breakfast tray was at bedside. I instructed patient that if the nausea or vomiting persists, we may have to reinsert NG tube to LIWS.

## 2022-09-21 NOTE — PROGRESS NOTES
General Surgery Progress Note    9/21/2022    Admit Date: 9/18/2022    Post Op Day 2:  EXPLORATORY LAPAROTOMY , REVISION OF MESH and LYSIS OF ADHESIONS done per Dr. Jac Merino     Chief Complaint: Abdominal Pain     Subjective: \" I feel really bloated and can't pass any gas or have bowel movement. I also vomited at home multiple times before I came to hospital.:        HPI: Jim Haines is a 78 y.o. male who we are asked by ED MD  to see for Abdominal Pain. Patient presented to ED on 9/18/2022 with abdominal pain and reports he feels distended and unable to pass flatus or have bowel movement. PT reports he did have multiple episodes of vomiting on 9/17/2022. Patient had a Laparoscopic Bilateral inguinal hernia repair with mesh and umbilical hernia repair with mesh done on 9/15/2022 per Dr. Jac Merino. Patient denies having any fevers or chills. Patient reports he has been taking the Norco pain tablets for pain but has also been taking Colace and Miralax. He states he has not had a bowel movement since Wednesday of last week. CT scan of Abdomen/Pelvis done  on admit showed SBO at umbilicus. Mesh appears to be in place and do not see that bowel is between mesh and peritoneum. On 9/19/2022: Dr. Jac Mreino saw patient and reviewed CT scan  and as per his notes state \" Observation vs exploration discussed w/pt and wife. Observation likely has lower probability of success compared to typical adhesive SBO due to the immediate postop nature of the inflammatory adhesion. As such, exploration recommended and pt agrees\"    On  9/20/2022: Patient pulled his NG tube out but Dr. Jac Merino was made aware and due to patient not having any nausea or vomiting, NG tube was left out. PT was placed on clear liquids which he has tolerated well. Pt had Abdominal X ray done after NGT was pulled out per patient, and per Dr. Jac Merino noted, there was a slight decrease in SB diameter and increased air in right colon.     Pt was started on Reglan as well. Subjective:   Patient resting in bed. PT denies any flatus or BM. Pt denies any nausea or vomiting past 24 hours. Patient tolerating clear liquids. Dressing to Umbilical area remains off at this time and no drainage present from incisional area. Temp Max in past 24 hrs was 99.3 oral.     Patient reports he still feels \"bloated\" but has been more ambulatory in past 24 hrs. Objective:     /63   Pulse 76   Temp 99.3 °F (37.4 °C)   Resp 17   Ht 5' 10\" (1.778 m)   Wt 232 lb (105.2 kg)   SpO2 94%   BMI 33.29 kg/m²       Intake/Output Summary (Last 24 hours) at 9/21/2022 0710  Last data filed at 9/20/2022 1208  Gross per 24 hour   Intake 1080 ml   Output --   Net 1080 ml         Physical Exam  Vitals and nursing note reviewed  Constitutional:       General: He is not in acute distress. HENT:      Head: Normocephalic and atraumatic. Right Ear: External ear normal.      Left Ear: External ear normal.      Nose: No congestion or rhinorrhea. Mouth: Mucous membranes are moist.   Eyes:      Extraocular Movements: Extraocular movements intact. Pupils: Pupils are equal, round, and reactive to light. Cardiovascular:      Rate and Rhythm: Normal rate and regular rhythm. Heart sounds: Normal heart sounds. Pulmonary:      Effort: Pulmonary effort is normal.      Breath sounds: Normal breath sounds. Abdominal:      General: There is moderate distension/Tympanitic with hypoactive bowel sounds noted x 4 quads. Tenderness: Appropriate tenderness noted to Umbilical Incisional area with steri-strips intact with old dried blood noted. Post operative Ecchymosis noted to area below umbilicus with no hematoma. Dressing to umbilicus assessed and no active drainage noted. Small area of excoriation noted to left incision area possibly due to tape. Musculoskeletal:         General: No swelling or tenderness. Normal range of motion.       Cervical back: Normal range of motion. Skin:     Findings: No rash. Neurological:      General: No focal deficit present. Mental Status: He is alert and oriented to person, place, and time. Psychiatric:         Mood and Affect: Mood normal.          Data Review  No results found for this or any previous visit (from the past 24 hour(s)). ASSESSMENT:  S/P Day 2: EXPLORATORY LAPAROTOMY , REVISION OF MESH and LYSIS OF ADHESIONS   Principal Problem:    Small bowel obstruction (HCC)  Resolved Problems:    * No resolved hospital problems.  *       PLAN:  -Discuss plan of care with Dr. Cash Chester as ordered  -Continue Clears for now  -Ambulate in eknnedy with assistance  -Encourage use of Incentive Spirometer  -IVF's until patient is taking adequate PO intake    Lauren STORM-BC

## 2022-09-21 NOTE — PLAN OF CARE
Problem: Discharge Planning  Goal: Discharge to home or other facility with appropriate resources  Outcome: Progressing     Problem: Safety - Adult  Goal: Free from fall injury  Outcome: Progressing  Flowsheets (Taken 9/21/2022 0645 by Armin Carlson RN)  Free From Fall Injury: Instruct family/caregiver on patient safety

## 2022-09-21 NOTE — CARE COORDINATION
Patient care plan reviewed in interdisciplinary rounds with the following disciplines: MD, nursing, case management, and therapy. Notes indicate that patient was nauseated earlier today and unable to tolerate a clear liquid diet. Pt unable to work with therapies at this time. CM will continue to follow to assist with discharge planning needs.

## 2022-09-22 LAB
ANION GAP SERPL CALC-SCNC: 6 MMOL/L (ref 4–13)
BASOPHILS # BLD: 0 K/UL (ref 0–0.2)
BASOPHILS NFR BLD: 1 % (ref 0–2)
BUN SERPL-MCNC: 14 MG/DL (ref 8–23)
CALCIUM SERPL-MCNC: 7.7 MG/DL (ref 8.3–10.4)
CHLORIDE SERPL-SCNC: 108 MMOL/L (ref 101–110)
CO2 SERPL-SCNC: 28 MMOL/L (ref 21–32)
CREAT SERPL-MCNC: 0.92 MG/DL (ref 0.8–1.5)
DIFFERENTIAL METHOD BLD: ABNORMAL
EOSINOPHIL # BLD: 0.2 K/UL (ref 0–0.8)
EOSINOPHIL NFR BLD: 5 % (ref 0.5–7.8)
ERYTHROCYTE [DISTWIDTH] IN BLOOD BY AUTOMATED COUNT: 12.6 % (ref 11.9–14.6)
GLUCOSE SERPL-MCNC: 101 MG/DL (ref 65–100)
HCT VFR BLD AUTO: 36.7 % (ref 41.1–50.3)
HGB BLD-MCNC: 12 G/DL (ref 13.6–17.2)
IMM GRANULOCYTES # BLD AUTO: 0 K/UL (ref 0–0.5)
IMM GRANULOCYTES NFR BLD AUTO: 0 % (ref 0–5)
LYMPHOCYTES # BLD: 1.2 K/UL (ref 0.5–4.6)
LYMPHOCYTES NFR BLD: 22 % (ref 13–44)
MAGNESIUM SERPL-MCNC: 2.1 MG/DL (ref 1.8–2.4)
MCH RBC QN AUTO: 28.5 PG (ref 26.1–32.9)
MCHC RBC AUTO-ENTMCNC: 32.7 G/DL (ref 31.4–35)
MCV RBC AUTO: 87.2 FL (ref 79.6–97.8)
MONOCYTES # BLD: 1.2 K/UL (ref 0.1–1.3)
MONOCYTES NFR BLD: 22 % (ref 4–12)
NEUTS SEG # BLD: 2.7 K/UL (ref 1.7–8.2)
NEUTS SEG NFR BLD: 51 % (ref 43–78)
NRBC # BLD: 0 K/UL (ref 0–0.2)
PLATELET # BLD AUTO: 251 K/UL (ref 150–450)
PMV BLD AUTO: 8.8 FL (ref 9.4–12.3)
POTASSIUM SERPL-SCNC: 3.2 MMOL/L (ref 3.5–5.1)
POTASSIUM SERPL-SCNC: 3.4 MMOL/L (ref 3.5–5.1)
RBC # BLD AUTO: 4.21 M/UL (ref 4.23–5.6)
SODIUM SERPL-SCNC: 142 MMOL/L (ref 136–145)
WBC # BLD AUTO: 5.4 K/UL (ref 4.3–11.1)

## 2022-09-22 PROCEDURE — 83735 ASSAY OF MAGNESIUM: CPT

## 2022-09-22 PROCEDURE — 6370000000 HC RX 637 (ALT 250 FOR IP): Performed by: SURGERY

## 2022-09-22 PROCEDURE — 80048 BASIC METABOLIC PNL TOTAL CA: CPT

## 2022-09-22 PROCEDURE — 85025 COMPLETE CBC W/AUTO DIFF WBC: CPT

## 2022-09-22 PROCEDURE — 6360000002 HC RX W HCPCS: Performed by: SURGERY

## 2022-09-22 PROCEDURE — 2580000003 HC RX 258: Performed by: NURSE PRACTITIONER

## 2022-09-22 PROCEDURE — 2500000003 HC RX 250 WO HCPCS: Performed by: NURSE PRACTITIONER

## 2022-09-22 PROCEDURE — 6370000000 HC RX 637 (ALT 250 FOR IP): Performed by: NURSE PRACTITIONER

## 2022-09-22 PROCEDURE — 36415 COLL VENOUS BLD VENIPUNCTURE: CPT

## 2022-09-22 PROCEDURE — 84132 ASSAY OF SERUM POTASSIUM: CPT

## 2022-09-22 PROCEDURE — 6360000002 HC RX W HCPCS: Performed by: NURSE PRACTITIONER

## 2022-09-22 PROCEDURE — 1100000000 HC RM PRIVATE

## 2022-09-22 RX ORDER — DEXTROSE, SODIUM CHLORIDE, AND POTASSIUM CHLORIDE 5; .45; .075 G/100ML; G/100ML; G/100ML
INJECTION INTRAVENOUS CONTINUOUS
Status: DISCONTINUED | OUTPATIENT
Start: 2022-09-22 | End: 2022-09-23

## 2022-09-22 RX ORDER — LOSARTAN POTASSIUM 50 MG/1
100 TABLET ORAL DAILY
Status: DISCONTINUED | OUTPATIENT
Start: 2022-09-22 | End: 2022-09-23 | Stop reason: HOSPADM

## 2022-09-22 RX ORDER — FAMOTIDINE 20 MG/1
20 TABLET, FILM COATED ORAL 2 TIMES DAILY
Status: DISCONTINUED | OUTPATIENT
Start: 2022-09-22 | End: 2022-09-23 | Stop reason: HOSPADM

## 2022-09-22 RX ORDER — DOCUSATE SODIUM 100 MG/1
100 CAPSULE, LIQUID FILLED ORAL 2 TIMES DAILY
Status: DISCONTINUED | OUTPATIENT
Start: 2022-09-22 | End: 2022-09-23 | Stop reason: HOSPADM

## 2022-09-22 RX ORDER — POTASSIUM CHLORIDE 7.45 MG/ML
10 INJECTION INTRAVENOUS PRN
Status: DISCONTINUED | OUTPATIENT
Start: 2022-09-22 | End: 2022-09-23

## 2022-09-22 RX ORDER — SODIUM CHLORIDE AND POTASSIUM CHLORIDE .9; .15 G/100ML; G/100ML
SOLUTION INTRAVENOUS CONTINUOUS
Status: DISCONTINUED | OUTPATIENT
Start: 2022-09-22 | End: 2022-09-22

## 2022-09-22 RX ADMIN — HYDROMORPHONE HYDROCHLORIDE 0.5 MG: 1 INJECTION, SOLUTION INTRAMUSCULAR; INTRAVENOUS; SUBCUTANEOUS at 19:55

## 2022-09-22 RX ADMIN — SODIUM CHLORIDE, PRESERVATIVE FREE 10 ML: 5 INJECTION INTRAVENOUS at 09:42

## 2022-09-22 RX ADMIN — METOCLOPRAMIDE 5 MG: 5 INJECTION, SOLUTION INTRAMUSCULAR; INTRAVENOUS at 02:05

## 2022-09-22 RX ADMIN — METOCLOPRAMIDE 5 MG: 5 INJECTION, SOLUTION INTRAMUSCULAR; INTRAVENOUS at 09:40

## 2022-09-22 RX ADMIN — ASPIRIN 81 MG: 81 TABLET, CHEWABLE ORAL at 09:39

## 2022-09-22 RX ADMIN — POTASSIUM CHLORIDE 10 MEQ: 7.46 INJECTION, SOLUTION INTRAVENOUS at 11:01

## 2022-09-22 RX ADMIN — POTASSIUM CHLORIDE, DEXTROSE MONOHYDRATE AND SODIUM CHLORIDE: 75; 5; 450 INJECTION, SOLUTION INTRAVENOUS at 15:05

## 2022-09-22 RX ADMIN — POTASSIUM CHLORIDE 10 MEQ: 7.46 INJECTION, SOLUTION INTRAVENOUS at 09:45

## 2022-09-22 RX ADMIN — FAMOTIDINE 20 MG: 20 TABLET, FILM COATED ORAL at 11:00

## 2022-09-22 RX ADMIN — MONTELUKAST 10 MG: 10 TABLET, FILM COATED ORAL at 09:40

## 2022-09-22 RX ADMIN — BISACODYL 10 MG: 10 SUPPOSITORY RECTAL at 09:41

## 2022-09-22 RX ADMIN — HYDROMORPHONE HYDROCHLORIDE 0.5 MG: 1 INJECTION, SOLUTION INTRAMUSCULAR; INTRAVENOUS; SUBCUTANEOUS at 09:40

## 2022-09-22 RX ADMIN — DOCUSATE SODIUM 100 MG: 100 CAPSULE ORAL at 19:55

## 2022-09-22 RX ADMIN — PAROXETINE 30 MG: 20 TABLET, FILM COATED ORAL at 17:15

## 2022-09-22 RX ADMIN — FAMOTIDINE 20 MG: 20 TABLET, FILM COATED ORAL at 19:55

## 2022-09-22 RX ADMIN — LOSARTAN POTASSIUM 100 MG: 50 TABLET, FILM COATED ORAL at 11:00

## 2022-09-22 RX ADMIN — METOCLOPRAMIDE 5 MG: 5 INJECTION, SOLUTION INTRAMUSCULAR; INTRAVENOUS at 15:07

## 2022-09-22 RX ADMIN — METOCLOPRAMIDE 5 MG: 5 INJECTION, SOLUTION INTRAMUSCULAR; INTRAVENOUS at 19:55

## 2022-09-22 ASSESSMENT — PAIN DESCRIPTION - ORIENTATION
ORIENTATION: LOWER
ORIENTATION: LOWER

## 2022-09-22 ASSESSMENT — PAIN DESCRIPTION - LOCATION
LOCATION: ABDOMEN

## 2022-09-22 ASSESSMENT — PAIN SCALES - GENERAL
PAINLEVEL_OUTOF10: 0
PAINLEVEL_OUTOF10: 7
PAINLEVEL_OUTOF10: 3
PAINLEVEL_OUTOF10: 8

## 2022-09-22 ASSESSMENT — PAIN - FUNCTIONAL ASSESSMENT
PAIN_FUNCTIONAL_ASSESSMENT: ACTIVITIES ARE NOT PREVENTED

## 2022-09-22 ASSESSMENT — PAIN DESCRIPTION - PAIN TYPE
TYPE: SURGICAL PAIN

## 2022-09-22 ASSESSMENT — PAIN DESCRIPTION - DESCRIPTORS
DESCRIPTORS: ACHING

## 2022-09-22 ASSESSMENT — PAIN DESCRIPTION - FREQUENCY
FREQUENCY: INTERMITTENT

## 2022-09-22 ASSESSMENT — PAIN DESCRIPTION - ONSET
ONSET: GRADUAL

## 2022-09-22 NOTE — PROGRESS NOTES
Spiritual Care Visit, initial visit. Visited with patient at bedside. Prayed for patient's healing and health. Visit by Brina Alfaro, Staff .  Danilo., Ally.B., B.A.

## 2022-09-22 NOTE — PROGRESS NOTES
General Surgery Progress Note    9/22/2022    Admit Date: 9/18/2022       Post Op Day 3:  EXPLORATORY LAPAROTOMY , REVISION OF MESH and LYSIS OF ADHESIONS done per Dr. Jeffrey Valle     Chief Complaint: Abdominal Pain     Subjective: \" I feel really bloated and can't pass any gas or have bowel movement. I also vomited at home multiple times before I came to hospital.:        HPI: Geovanna Guerra is a 78 y.o. male who we are asked by ED MD  to see for Abdominal Pain. Patient presented to ED on 9/18/2022 with abdominal pain and reports he feels distended and unable to pass flatus or have bowel movement. PT reports he did have multiple episodes of vomiting on 9/17/2022. Patient had a Laparoscopic Bilateral inguinal hernia repair with mesh and umbilical hernia repair with mesh done on 9/15/2022 per Dr. Jeffrey Valle. Patient denies having any fevers or chills. Patient reports he has been taking the Norco pain tablets for pain but has also been taking Colace and Miralax. He states he has not had a bowel movement since Wednesday of last week. CT scan of Abdomen/Pelvis done  on admit showed SBO at umbilicus. Mesh appears to be in place and do not see that bowel is between mesh and peritoneum. On 9/19/2022: Dr. Jeffrey Valle saw patient and reviewed CT scan  and as per his notes state \" Observation vs exploration discussed w/pt and wife. Observation likely has lower probability of success compared to typical adhesive SBO due to the immediate postop nature of the inflammatory adhesion. As such, exploration recommended and pt agrees\"     On  9/20/2022: Patient pulled his NG tube out but Dr. Jeffrey Valle was made aware and due to patient not having any nausea or vomiting, NG tube was left out. PT was placed on clear liquids which he has tolerated well. Pt had Abdominal X ray done after NGT was pulled out per patient, and per Dr. Jeffrey Valle noted, there was a slight decrease in SB diameter and increased air in right colon. Pt was started on Reglan as well.     9/21/2022:  Patient began passing flatus and reports he had BM . Pt was given a Dulcolax Suppository yesterday with good results per patient. Pt tolerated a few sips of clears last night. Subjective:     Pt has been up  in kenn[place] walking with assistance. PT denies any nausea or vomiting this morning. PT reports + Flatus and + BM. Will continue with Bowel regimen for now and advance diet to full liquids later today. Patient reports \" I actually feel better. \"    Patient remain afebrile. Pt's BP slightly elevated therefore will add Cozaar 100 mg PO as he has taken this in the past for HTN. Patient denies any chest pain, dizziness or dyspnea. Objective:     BP (!) 159/84   Pulse 69   Temp 97.7 °F (36.5 °C) (Oral)   Resp 16   Ht 5' 10\" (1.778 m)   Wt 232 lb (105.2 kg)   SpO2 95%   BMI 33.29 kg/m²     No intake or output data in the 24 hours ending 09/22/22 0909        Physical Exam  Vitals and nursing note reviewed  Constitutional:       General: He is not in acute distress. HENT:      Head: Normocephalic and atraumatic. Right Ear: External ear normal.      Left Ear: External ear normal.      Nose: No congestion or rhinorrhea. Mouth: Mucous membranes are moist.   Eyes:      Extraocular Movements: Extraocular movements intact. Pupils: Pupils are equal, round, and reactive to light. Cardiovascular:      Rate and Rhythm: Normal rate and regular rhythm. Heart sounds: Normal heart sounds. Pulmonary:      Effort: Pulmonary effort is normal.      Breath sounds: Normal breath sounds. Abdominal:      General: There is mild distension with hypoactive bowel sounds noted x 4 quads. Tenderness: Appropriate tenderness noted to Umbilical Incisional area with steri-strips intact with old dried blood noted. Post operative Ecchymosis noted to area below umbilicus with no hematoma.    Dressing to umbilicus assessed and no active drainage noted. Small area of excoriation noted to left incision area possibly due to tape. Musculoskeletal:         General: No swelling or tenderness. Normal range of motion. Cervical back: Normal range of motion. Skin:     Findings: No rash. Neurological:      General: No focal deficit present. Mental Status: He is alert and oriented to person, place, and time.    Psychiatric:         Mood and Affect: Mood normal.           Data Review    Recent Results (from the past 24 hour(s))   CBC with Auto Differential    Collection Time: 09/22/22  4:56 AM   Result Value Ref Range    WBC 5.4 4.3 - 11.1 K/uL    RBC 4.21 (L) 4.23 - 5.6 M/uL    Hemoglobin 12.0 (L) 13.6 - 17.2 g/dL    Hematocrit 36.7 (L) 41.1 - 50.3 %    MCV 87.2 79.6 - 97.8 FL    MCH 28.5 26.1 - 32.9 PG    MCHC 32.7 31.4 - 35.0 g/dL    RDW 12.6 11.9 - 14.6 %    Platelets 622 212 - 995 K/uL    MPV 8.8 (L) 9.4 - 12.3 FL    nRBC 0.00 0.0 - 0.2 K/uL    Differential Type AUTOMATED      Seg Neutrophils 51 43 - 78 %    Lymphocytes 22 13 - 44 %    Monocytes 22 (H) 4.0 - 12.0 %    Eosinophils % 5 0.5 - 7.8 %    Basophils 1 0.0 - 2.0 %    Immature Granulocytes 0 0.0 - 5.0 %    Segs Absolute 2.7 1.7 - 8.2 K/UL    Absolute Lymph # 1.2 0.5 - 4.6 K/UL    Absolute Mono # 1.2 0.1 - 1.3 K/UL    Absolute Eos # 0.2 0.0 - 0.8 K/UL    Basophils Absolute 0.0 0.0 - 0.2 K/UL    Absolute Immature Granulocyte 0.0 0.0 - 0.5 K/UL   Basic Metabolic Panel w/ Reflex to MG    Collection Time: 09/22/22  4:56 AM   Result Value Ref Range    Sodium 142 136 - 145 mmol/L    Potassium 3.2 (L) 3.5 - 5.1 mmol/L    Chloride 108 101 - 110 mmol/L    CO2 28 21 - 32 mmol/L    Anion Gap 6 4 - 13 mmol/L    Glucose 101 (H) 65 - 100 mg/dL    BUN 14 8 - 23 MG/DL    Creatinine 0.92 0.8 - 1.5 MG/DL    GFR African American >60 >60 ml/min/1.73m2    GFR Non- >60 >60 ml/min/1.73m2    Calcium 7.7 (L) 8.3 - 10.4 MG/DL   Magnesium    Collection Time: 09/22/22  4:56 AM   Result Value Ref Range Magnesium 2.1 1.8 - 2.4 mg/dL      ASSESSMENT:  S/P Day 3: EXPLORATORY LAPAROTOMY , REVISION OF MESH and LYSIS OF ADHESIONS   Principal Problem:    Small bowel obstruction (HCC)  Resolved Problems:    * No resolved hospital problems. *      PLAN:  -Discuss plan of care with Dr. Amberly Randolph as ordered  -Continue Clears for now then advance diet to full liquid if he tolerates clears  -Ambulate in kennedy with assistance  -Encourage use of Incentive Spirometer  -IVF's until patient is taking adequate PO intake and change to  D5 1/2 NS with 20 meq KCL  -Replete K+ per electrolyte protocol/Repeat BMP 9/23/2022  -Added Cozaar 100 mg PO daily for HTN as patient has taken this within the past 6 months .     Tom AMAROP-BC

## 2022-09-23 VITALS
HEART RATE: 69 BPM | BODY MASS INDEX: 33.21 KG/M2 | TEMPERATURE: 98.1 F | RESPIRATION RATE: 17 BRPM | WEIGHT: 232 LBS | SYSTOLIC BLOOD PRESSURE: 152 MMHG | OXYGEN SATURATION: 97 % | HEIGHT: 70 IN | DIASTOLIC BLOOD PRESSURE: 73 MMHG

## 2022-09-23 LAB
ANION GAP SERPL CALC-SCNC: 6 MMOL/L (ref 4–13)
BUN SERPL-MCNC: 9 MG/DL (ref 8–23)
CALCIUM SERPL-MCNC: 7.6 MG/DL (ref 8.3–10.4)
CHLORIDE SERPL-SCNC: 107 MMOL/L (ref 101–110)
CO2 SERPL-SCNC: 26 MMOL/L (ref 21–32)
CREAT SERPL-MCNC: 0.94 MG/DL (ref 0.8–1.5)
GLUCOSE SERPL-MCNC: 123 MG/DL (ref 65–100)
POTASSIUM SERPL-SCNC: 3.4 MMOL/L (ref 3.5–5.1)
SODIUM SERPL-SCNC: 139 MMOL/L (ref 136–145)

## 2022-09-23 PROCEDURE — 80048 BASIC METABOLIC PNL TOTAL CA: CPT

## 2022-09-23 PROCEDURE — 6370000000 HC RX 637 (ALT 250 FOR IP): Performed by: NURSE PRACTITIONER

## 2022-09-23 PROCEDURE — 6360000002 HC RX W HCPCS: Performed by: NURSE PRACTITIONER

## 2022-09-23 PROCEDURE — 6360000002 HC RX W HCPCS: Performed by: SURGERY

## 2022-09-23 PROCEDURE — 2580000003 HC RX 258: Performed by: NURSE PRACTITIONER

## 2022-09-23 PROCEDURE — 36415 COLL VENOUS BLD VENIPUNCTURE: CPT

## 2022-09-23 PROCEDURE — 2500000003 HC RX 250 WO HCPCS: Performed by: NURSE PRACTITIONER

## 2022-09-23 PROCEDURE — 6370000000 HC RX 637 (ALT 250 FOR IP): Performed by: SURGERY

## 2022-09-23 RX ORDER — POTASSIUM CHLORIDE 20 MEQ/1
20 TABLET, EXTENDED RELEASE ORAL
Status: DISCONTINUED | OUTPATIENT
Start: 2022-09-23 | End: 2022-09-23 | Stop reason: HOSPADM

## 2022-09-23 RX ORDER — PSEUDOEPHEDRINE HCL 30 MG
100 TABLET ORAL 2 TIMES DAILY PRN
Qty: 60 CAPSULE | Refills: 0 | Status: SHIPPED | OUTPATIENT
Start: 2022-09-23 | End: 2022-10-23

## 2022-09-23 RX ADMIN — MONTELUKAST 10 MG: 10 TABLET, FILM COATED ORAL at 09:21

## 2022-09-23 RX ADMIN — BISACODYL 10 MG: 10 SUPPOSITORY RECTAL at 09:21

## 2022-09-23 RX ADMIN — FAMOTIDINE 20 MG: 20 TABLET, FILM COATED ORAL at 09:21

## 2022-09-23 RX ADMIN — ASPIRIN 81 MG: 81 TABLET, CHEWABLE ORAL at 09:20

## 2022-09-23 RX ADMIN — POTASSIUM CHLORIDE, DEXTROSE MONOHYDRATE AND SODIUM CHLORIDE: 75; 5; 450 INJECTION, SOLUTION INTRAVENOUS at 01:05

## 2022-09-23 RX ADMIN — SODIUM CHLORIDE, PRESERVATIVE FREE 10 ML: 5 INJECTION INTRAVENOUS at 09:24

## 2022-09-23 RX ADMIN — METOCLOPRAMIDE 5 MG: 5 INJECTION, SOLUTION INTRAMUSCULAR; INTRAVENOUS at 09:20

## 2022-09-23 RX ADMIN — LOSARTAN POTASSIUM 100 MG: 50 TABLET, FILM COATED ORAL at 09:20

## 2022-09-23 RX ADMIN — ACETAMINOPHEN 650 MG: 325 TABLET, FILM COATED ORAL at 09:19

## 2022-09-23 RX ADMIN — PROCHLORPERAZINE EDISYLATE 10 MG: 5 INJECTION INTRAMUSCULAR; INTRAVENOUS at 00:31

## 2022-09-23 RX ADMIN — POTASSIUM CHLORIDE 20 MEQ: 1500 TABLET, EXTENDED RELEASE ORAL at 09:21

## 2022-09-23 RX ADMIN — DOCUSATE SODIUM 100 MG: 100 CAPSULE ORAL at 09:20

## 2022-09-23 ASSESSMENT — PAIN SCALES - GENERAL
PAINLEVEL_OUTOF10: 3
PAINLEVEL_OUTOF10: 3
PAINLEVEL_OUTOF10: 0

## 2022-09-23 ASSESSMENT — PAIN DESCRIPTION - ORIENTATION
ORIENTATION: LOWER
ORIENTATION: LOWER

## 2022-09-23 ASSESSMENT — PAIN - FUNCTIONAL ASSESSMENT
PAIN_FUNCTIONAL_ASSESSMENT: PREVENTS OR INTERFERES SOME ACTIVE ACTIVITIES AND ADLS
PAIN_FUNCTIONAL_ASSESSMENT: PREVENTS OR INTERFERES SOME ACTIVE ACTIVITIES AND ADLS

## 2022-09-23 ASSESSMENT — PAIN DESCRIPTION - LOCATION
LOCATION: ABDOMEN
LOCATION: ABDOMEN

## 2022-09-23 ASSESSMENT — PAIN DESCRIPTION - DESCRIPTORS
DESCRIPTORS: CRAMPING
DESCRIPTORS: CRAMPING

## 2022-09-23 ASSESSMENT — PAIN DESCRIPTION - FREQUENCY: FREQUENCY: INTERMITTENT

## 2022-09-23 ASSESSMENT — PAIN DESCRIPTION - PAIN TYPE: TYPE: SURGICAL PAIN

## 2022-09-23 NOTE — DISCHARGE INSTRUCTIONS
Jose Ingram M.D.  [phone]    Instructions following Hernia Repair:    ACTIVITY:  Try to take a few short walks with help around the house later today. It is very important to take short walks to avoid blood clots and pneumonia. You may be light-headed or sleepy from anesthesia, so be careful going up and down stairs. Avoid any activity that involves lifting/pushing more than 30 pounds until your followup appointment  DIET:  Drink only clear, non-carbonated liquids when you first get home (sugar-free if you are diabetic), such as Gatorade, chicken broth, etc.  Later  you may resume a more normal diet, depending on how you feel  Using Miralax or other over the counter laxative is ok if you develop constipation    PAIN:  You will be given a prescription for pain medication. Try to take the pain medication with food, even a few crackers. You may also use Tylenol, Motrin, Advil, or Aleve instead of the prescription pain medication. Do no take Tylenol and the prescription pain medication within 6 hours of each other. URINARY RETENTION: If you are unable to empty your bladder within 6 hours after returning home, please go to your nearest Emergency Department or Urgent Care for urinary catheterization. WOUND CARE:  You may shower the day after surgery, unless instructed otherwise. It is not uncommon for the incisions to ooze or drain blood-tinged fluid. You may remove the clear dressing on the fifth postoperative day. Incisions will sometimes develop redness around them, up to the size of a quarter, as well as a hard lumpy feel. If this redness continues to get larger, please call the office. FOLLOW UP:  Your follow-up appointment is usually made when your surgery is arranged. Please call the office if you are not sure of this appointment. CALL THE DOCTOR IF:  You have a temperature higher than 101.5° Fahrenheit for more than 6 hours. You have severe nausea or vomiting.   You develop increasing redness or infection at the incision. Continue home medications as previously prescribed. Follow up with your PCP for medical management  Patient has Norco Pain tablets at home that he was prescribed on discharge prior but was instructed to call office if his pain worsens.

## 2022-09-23 NOTE — CARE COORDINATION
Patient is medically stable and ready for discharge today. He will go home with family. Pt is independent at baseline. No discharge planning needs.         09/19/22 1340   Service Assessment   Patient Orientation Alert and Oriented;Person;Place;Situation;Self   Cognition Alert   History Provided By Patient   Primary Caregiver Self   Support Systems Spouse/Significant Other   PCP Verified by CM Yes   Prior Functional Level Independent in ADLs/IADLs   Current Functional Level Assistance with the following:;Bathing;Dressing   Can patient return to prior living arrangement Yes   Ability to make needs known: Good   Family able to assist with home care needs: Yes   Social/Functional History   Lives With Spouse   Type of [address] [place] [address] Help From Family   ADL Assistance Independent   Active  Yes   Mode of Transportation Car   Occupation Retired   Discharge Planning   Living Arrangements Spouse/Significant Other   Patient expects to be discharged to: House   History of falls? 0

## 2022-09-23 NOTE — DISCHARGE SUMMARY
SURGERY DISCHARGE SUMMARY    Name:  Gene Terry Date/Time of Admission: 2022  7:48 PM    CSN: [id number]  Attending Provider: Chris Chandler MD   Room/Bed:  [facility]  : 1945  Age: 78 y.o. Admission Date:  2022    Admission Diagnosis:  Abdominal Pain      Discharge Date:  2022      Discharge Diagnoses:  EXPLORATORY LAPAROTOMY , REVISION OF MESH and LYSIS OF ADHESIONS done per Dr. Rosario Lau on 2022    Patient Active Problem List   Diagnosis    HTN (hypertension)    Paroxysmal atrial fibrillation (HCC)    Postoperative anemia due to acute blood loss    Abnormal nuclear stress test    CAD (coronary artery disease)    GERD (gastroesophageal reflux disease)    Calcification of abdominal aorta (HCC)    Anemia    Postsurgical aortocoronary bypass status    Obesity, unspecified    Tobacco use disorder    S/P PTCA (percutaneous transluminal coronary angioplasty)    S/P CABG (coronary artery bypass graft)    Dyslipidemia    Coronary atherosclerosis of native coronary vessel    NEWSOME (dyspnea on exertion)    Mild episode of recurrent major depressive disorder ([address]    Umbilical hernia without obstruction and without gangrene    Non-recurrent bilateral inguinal hernia without obstruction or gangrene    Antiplatelet or antithrombotic long-term use    Small bowel obstruction ([address]        Consultants:  NONE    Procedures Performed:  EXPLORATORY LAPAROTOMY , REVISION OF MESH and LYSIS OF ADHESIONS done per Dr. Rosario Lau done on 2022    Hospital Course: Gene Terry is a 78 y.o. male who we are asked by ED MD  to see for Abdominal Pain. Patient presented to ED on 2022 with abdominal pain and reports he feels distended and unable to pass flatus or have bowel movement. PT reports he did have multiple episodes of vomiting on 2022. Patient had a Laparoscopic Bilateral inguinal hernia repair with mesh and umbilical hernia repair with mesh done on 9/15/2022 per Dr. Rosario Lau. Patient denies having any fevers or chills. Patient reports he has been taking the Norco pain tablets for pain but has also been taking Colace and Miralax. He states he has not had a bowel movement since Wednesday of last week. CT scan of Abdomen/Pelvis done  on admit showed SBO at umbilicus. Mesh appears to be in place and do not see that bowel is between mesh and peritoneum. On 9/19/2022: Dr. Stuart Avilez saw patient and reviewed CT scan  and as per his notes state \" Observation vs exploration discussed w/pt and wife. Observation likely has lower probability of success compared to typical adhesive SBO due to the immediate postop nature of the inflammatory adhesion. As such, exploration recommended and pt agrees\"     On  9/20/2022: Patient pulled his NG tube out but Dr. Stuart Avilez was made aware and due to patient not having any nausea or vomiting, NG tube was left out. PT was placed on clear liquids which he has tolerated well. Pt had Abdominal X ray done after NGT was pulled out per patient, and per Dr. Stuart Avilez noted, there was a slight decrease in SB diameter and increased air in right colon. Pt was started on Reglan as well.     9/21/2022:  Patient began passing flatus and reports he had BM . Pt was given a Dulcolax Suppository yesterday with good results per patient. Pt tolerated a few sips of clears last night.      9/22/2022: Patient tolerated clear liquid diet. Patient is passing flatus and having Bowel movements. PT denies any nausea or vomiting. Patient ambulated  in kennedy. 9/23/2022  Patient resting in bed without complaints. PT reports + BM (several) yesterday and tolerated clear liquids . Diet advanced today to full liquids and patient tolerated well. Patient progressed satisfactorily meeting milestones necessary for successful discharge including tolerating full liquid diet, pain controlled, ambulating in kennedy and has had a several BM's.        Labs:   Latest Reference Range & Units 9/23/22 05:57   Sodium 136 - 145 mmol/L 139   Potassium 3.5 - 5.1 mmol/L 3.4 (L)   Chloride 101 - 110 mmol/L 107   CO2 21 - 32 mmol/L 26   BUN,BUNPL 8 - 23 MG/DL 9   Creatinine 0.8 - 1.5 MG/DL 0.94   Anion Gap 4 - 13 mmol/L 6   GFR Non-African American >60 ml/min/1.73m2 >60   GFR  >60 ml/min/1.73m2 >60    Patient takes Potassium supplement  at home prescribed per his PCP which is listed on database.          Disposition:  Home with family     Discharge Diet:   Full liquids for the next 2 to 3 days then resume diet as tolerated    Discharge Medications:       Medication List        START taking these medications      docusate 100 MG Caps  Commonly known as: COLACE, DULCOLAX  Take 100 mg by mouth 2 times daily as needed for Constipation            CONTINUE taking these medications      APPLE CIDER VINEGAR PO     ascorbic acid 500 MG tablet  Commonly known as: VITAMIN C     aspirin 81 MG EC tablet     clopidogrel 75 MG tablet  Commonly known as: PLAVIX     Coenzyme Q10 10 MG Caps     cyanocobalamin 100 MCG tablet     GARLIC PO     HAWTHORN CAVAZOS PO     LECITHIN PO     LORazepam 1 MG tablet  Commonly known as: ATIVAN     montelukast 10 MG tablet  Commonly known as: SINGULAIR     PARoxetine 20 MG tablet  Commonly known as: PAXIL     potassium chloride 20 MEQ extended release tablet  Commonly known as: KLOR-CON M     Praluent 75 MG/ML Soaj injection pen  Generic drug: alirocumab     thiamine 100 MG tablet     vitamin E 400 UNIT capsule            STOP taking these medications      furosemide 40 MG tablet  Commonly known as: LASIX     HYDROcodone-acetaminophen 5-325 MG per tablet  Commonly known as: Norco     nitroGLYCERIN 0.4 MG SL tablet  Commonly known as: NITROSTAT     omeprazole 20 MG delayed release capsule  Commonly known as: PRILOSEC     raNITIdine 150 MG tablet  Commonly known as: ZANTAC     valsartan 80 MG tablet  Commonly known as: DIOVAN     zaleplon 5 MG capsule  Commonly known as: SONATA            ASK your doctor about these medications      losartan 100 MG tablet  Commonly known as: COZAAR     metoprolol succinate 25 MG extended release tablet  Commonly known as: TOPROL XL               Where to Get Your Medications        These medications were sent to [facility] [address] [facility] [address] [place] [address]      Phone: [phone]   docusate 100 MG Caps         Follow Up: With surgeon in 7 to 10 days. Appointment to be made prior to being discharged from hospital per nurse. With primary care physician PRN    Discharge Instructions Provided to patient:  See specific instructions as per Dr. Linda Mcdaniel as noted on summary  May shower but keep incisional area clean and dry  Increase water intake to assist with constipation  Take Pain pills that were given at the prior discharge but if pain worsens call office  Diet as instructed   No heavy lifting until seen in office.      Electronically signed by:  JEANA Cheatham NP  9/23/2022

## 2022-09-23 NOTE — PROGRESS NOTES
General Surgery Progress Note    9/23/2022    Admit Date: 9/18/2022    Post Op Day 4:  EXPLORATORY LAPAROTOMY , REVISION OF MESH and LYSIS OF ADHESIONS done per Dr. Cara Storey     Chief Complaint: Abdominal Pain     Subjective: \" I feel really bloated and can't pass any gas or have bowel movement. I also vomited at home multiple times before I came to hospital.:        HPI: Emily Witt is a 78 y.o. male who we are asked by ED MD  to see for Abdominal Pain. Patient presented to ED on 9/18/2022 with abdominal pain and reports he feels distended and unable to pass flatus or have bowel movement. PT reports he did have multiple episodes of vomiting on 9/17/2022. Patient had a Laparoscopic Bilateral inguinal hernia repair with mesh and umbilical hernia repair with mesh done on 9/15/2022 per Dr. Cara Storey. Patient denies having any fevers or chills. Patient reports he has been taking the Norco pain tablets for pain but has also been taking Colace and Miralax. He states he has not had a bowel movement since Wednesday of last week. CT scan of Abdomen/Pelvis done  on admit showed SBO at umbilicus. Mesh appears to be in place and do not see that bowel is between mesh and peritoneum. On 9/19/2022: Dr. Cara Storey saw patient and reviewed CT scan  and as per his notes state \" Observation vs exploration discussed w/pt and wife. Observation likely has lower probability of success compared to typical adhesive SBO due to the immediate postop nature of the inflammatory adhesion. As such, exploration recommended and pt agrees\"     On  9/20/2022: Patient pulled his NG tube out but Dr. Cara Storey was made aware and due to patient not having any nausea or vomiting, NG tube was left out. PT was placed on clear liquids which he has tolerated well. Pt had Abdominal X ray done after NGT was pulled out per patient, and per Dr. Cara Storey noted, there was a slight decrease in SB diameter and increased air in right colon.     Pt was started on Reglan as well.     9/21/2022:  Patient began passing flatus and reports he had BM . Pt was given a Dulcolax Suppository yesterday with good results per patient. Pt tolerated a few sips of clears last night.      9/22/2022: Patient tolerated clear liquid diet. Patient is passing flatus and having Bowel movements. PT denies any nausea or vomiting. Patient ambulated  in kennedy. Subjective:     Patient resting in bed without complaints. PT reports + BM (several) yesterday and tolerated clear liquids . Diet to be advanced today to full liquids and possibly discharged home later today. Patient remains afebrile. Pt desires to go home today if he is able to tolerate diet. Objective:     /75   Pulse 72   Temp 98.1 °F (36.7 °C) (Oral)   Resp 18   Ht 5' 10\" (1.778 m)   Wt 232 lb (105.2 kg)   SpO2 95%   BMI 33.29 kg/m²     No intake or output data in the 24 hours ending 09/23/22 0643       Physical Exam  Vitals and nursing note reviewed  Constitutional:       General: He is not in acute distress. HENT:      Head: Normocephalic and atraumatic. Right Ear: External ear normal.      Left Ear: External ear normal.      Nose: No congestion or rhinorrhea. Mouth: Mucous membranes are moist.   Eyes:      Extraocular Movements: Extraocular movements intact. Pupils: Pupils are equal, round, and reactive to light. Cardiovascular:      Rate and Rhythm: Normal rate and regular rhythm. Heart sounds: Normal heart sounds. Pulmonary:      Effort: Pulmonary effort is normal.      Breath sounds: Normal breath sounds. Abdominal:      General: There is mild distension  but this has decreased since yesterday. Hypoactive bowel sounds noted x 4 quads. Tenderness: Appropriate tenderness noted to Umbilical Incisional area with steri-strips intact with old dried blood noted and serous drainage noted to lower portion of incision.    Post operative Ecchymosis noted to area below umbilicus with no hematoma. Musculoskeletal:         General: No swelling or tenderness. Normal range of motion. Cervical back: Normal range of motion. Skin:     Findings: No rash. Neurological:      General: No focal deficit present. Mental Status: He is alert and oriented to person, place, and time. Psychiatric:         Mood and Affect: Mood normal.     Data Review    Recent Results (from the past 24 hour(s))   Potassium    Collection Time: 09/22/22  6:19 PM   Result Value Ref Range    Potassium 3.4 (L) 3.5 - 5.1 mmol/L   Basic Metabolic Panel    Collection Time: 09/23/22  5:57 AM   Result Value Ref Range    Sodium 139 136 - 145 mmol/L    Potassium 3.4 (L) 3.5 - 5.1 mmol/L    Chloride 107 101 - 110 mmol/L    CO2 26 21 - 32 mmol/L    Anion Gap 6 4 - 13 mmol/L    Glucose 123 (H) 65 - 100 mg/dL    BUN 9 8 - 23 MG/DL    Creatinine 0.94 0.8 - 1.5 MG/DL    GFR African American >60 >60 ml/min/1.73m2    GFR Non- >60 >60 ml/min/1.73m2    Calcium 7.6 (L) 8.3 - 10.4 MG/DL      ASSESSMENT:  S/P Day 4: EXPLORATORY LAPAROTOMY , REVISION OF MESH and LYSIS OF ADHESIONS   Principal Problem:    Small bowel obstruction (HCC)  Resolved Problems:    * No resolved hospital problems.  *    PLAN:  -Discuss plan of care with Dr. Michael Lagunas  -Marita Schuster as ordered  -Advance diet to full liquids  -Ambulate in kennedy with assistance  -Encourage use of Incentive Spirometer  -D/C IVF  -Replete K+ per electrolyte protocol  -Added Cozaar 100 mg PO daily for HTN as patient has taken this within the past 6 months , which BP has improved past 12 hours.  -Possible discharge home later today    Cherelle STORM-BC

## 2022-09-25 ENCOUNTER — TELEPHONE (OUTPATIENT)
Dept: SURGERY | Age: 77
End: 2022-09-25

## 2022-09-25 RX ORDER — PROMETHAZINE HYDROCHLORIDE 12.5 MG/1
12.5 TABLET ORAL 3 TIMES DAILY PRN
Qty: 12 TABLET | Refills: 0 | Status: SHIPPED | OUTPATIENT
Start: 2022-09-25 | End: 2022-10-02

## 2022-09-25 NOTE — TELEPHONE ENCOUNTER
Phone call from pt's wife stating pt was discharged from the hospital 9/23/22 after an Exploratory Laparotomy, Revision of mesh and lysis of adhesions by Dr Dejuan Miguel on 9/19/22. She states pt is experiencing nausea in the afternoons just like he did when he was hospitalized. No other symptoms. Reports +flatus/ +BM. She is requesting Rx for phenergan. Rx sent to pt pharmacy. Instructed to contact office for follow up if symptoms worsen or no improvement. Cg verbalized an understanding.          Manuel Mendez NP

## 2022-09-28 ENCOUNTER — HOSPITAL ENCOUNTER (EMERGENCY)
Dept: CT IMAGING | Age: 77
Discharge: HOME | DRG: 862 | End: 2022-10-01
Payer: MEDICARE

## 2022-09-28 ENCOUNTER — APPOINTMENT (OUTPATIENT)
Dept: ULTRASOUND IMAGING | Age: 77
DRG: 862 | End: 2022-09-28
Payer: MEDICARE

## 2022-09-28 ENCOUNTER — HOSPITAL ENCOUNTER (INPATIENT)
Age: 77
LOS: 2 days | Discharge: HOME HEALTH | DRG: 862 | End: 2022-09-30
Attending: STUDENT IN AN ORGANIZED HEALTH CARE EDUCATION/TRAINING PROGRAM | Admitting: INTERNAL MEDICINE
Payer: MEDICARE

## 2022-09-28 DIAGNOSIS — T81.40XA POSTOPERATIVE INFECTION, UNSPECIFIED TYPE, INITIAL ENCOUNTER: Primary | ICD-10-CM

## 2022-09-28 DIAGNOSIS — M79.605 LEG PAIN, BILATERAL: ICD-10-CM

## 2022-09-28 DIAGNOSIS — M79.604 LEG PAIN, BILATERAL: ICD-10-CM

## 2022-09-28 DIAGNOSIS — T81.49XA POSTOPERATIVE WOUND INFECTION: ICD-10-CM

## 2022-09-28 LAB
ALBUMIN SERPL-MCNC: 2.9 G/DL (ref 3.2–4.6)
ALBUMIN/GLOB SERPL: 0.8 (ref 1.2–3.5)
ALP SERPL-CCNC: 54 U/L (ref 50–136)
ALT SERPL-CCNC: 17 U/L (ref 12–65)
ANION GAP SERPL CALC-SCNC: 7 MMOL/L (ref 4–13)
APPEARANCE UR: CLEAR
AST SERPL-CCNC: 13 U/L (ref 15–37)
BASOPHILS # BLD: 0.1 K/UL (ref 0–0.2)
BASOPHILS NFR BLD: 0 % (ref 0–2)
BILIRUB SERPL-MCNC: 0.5 MG/DL (ref 0.2–1.1)
BILIRUB UR QL: NEGATIVE
BUN SERPL-MCNC: 9 MG/DL (ref 8–23)
CALCIUM SERPL-MCNC: 9.1 MG/DL (ref 8.3–10.4)
CHLORIDE SERPL-SCNC: 103 MMOL/L (ref 101–110)
CO2 SERPL-SCNC: 28 MMOL/L (ref 21–32)
COLOR UR: NORMAL
CREAT SERPL-MCNC: 1 MG/DL (ref 0.8–1.5)
DIFFERENTIAL METHOD BLD: ABNORMAL
EOSINOPHIL # BLD: 0.2 K/UL (ref 0–0.8)
EOSINOPHIL NFR BLD: 2 % (ref 0.5–7.8)
ERYTHROCYTE [DISTWIDTH] IN BLOOD BY AUTOMATED COUNT: 12.6 % (ref 11.9–14.6)
GLOBULIN SER CALC-MCNC: 3.7 G/DL (ref 2.3–3.5)
GLUCOSE SERPL-MCNC: 125 MG/DL (ref 65–100)
GLUCOSE UR STRIP.AUTO-MCNC: NEGATIVE MG/DL
HCT VFR BLD AUTO: 37.1 % (ref 41.1–50.3)
HGB BLD-MCNC: 12.2 G/DL (ref 13.6–17.2)
HGB UR QL STRIP: NEGATIVE
IMM GRANULOCYTES # BLD AUTO: 0.1 K/UL (ref 0–0.5)
IMM GRANULOCYTES NFR BLD AUTO: 1 % (ref 0–5)
KETONES UR QL STRIP.AUTO: NEGATIVE MG/DL
LACTATE SERPL-SCNC: 0.9 MMOL/L (ref 0.4–2)
LEUKOCYTE ESTERASE UR QL STRIP.AUTO: NEGATIVE
LIPASE SERPL-CCNC: 174 U/L (ref 73–393)
LYMPHOCYTES # BLD: 1.5 K/UL (ref 0.5–4.6)
LYMPHOCYTES NFR BLD: 13 % (ref 13–44)
MCH RBC QN AUTO: 28.2 PG (ref 26.1–32.9)
MCHC RBC AUTO-ENTMCNC: 32.9 G/DL (ref 31.4–35)
MCV RBC AUTO: 85.9 FL (ref 79.6–97.8)
MONOCYTES # BLD: 1.5 K/UL (ref 0.1–1.3)
MONOCYTES NFR BLD: 13 % (ref 4–12)
NEUTS SEG # BLD: 8.6 K/UL (ref 1.7–8.2)
NEUTS SEG NFR BLD: 72 % (ref 43–78)
NITRITE UR QL STRIP.AUTO: NEGATIVE
NRBC # BLD: 0 K/UL (ref 0–0.2)
PH UR STRIP: 6.5 (ref 5–9)
PLATELET # BLD AUTO: 412 K/UL (ref 150–450)
PMV BLD AUTO: 8.6 FL (ref 9.4–12.3)
POTASSIUM SERPL-SCNC: 3.5 MMOL/L (ref 3.5–5.1)
PROT SERPL-MCNC: 6.6 G/DL (ref 6.3–8.2)
PROT UR STRIP-MCNC: NEGATIVE MG/DL
RBC # BLD AUTO: 4.32 M/UL (ref 4.23–5.6)
SODIUM SERPL-SCNC: 138 MMOL/L (ref 136–145)
SP GR UR REFRACTOMETRY: 1.02 (ref 1–1.02)
UROBILINOGEN UR QL STRIP.AUTO: 0.2 EU/DL (ref 0.2–1)
WBC # BLD AUTO: 12 K/UL (ref 4.3–11.1)

## 2022-09-28 PROCEDURE — 83690 ASSAY OF LIPASE: CPT

## 2022-09-28 PROCEDURE — 83605 ASSAY OF LACTIC ACID: CPT

## 2022-09-28 PROCEDURE — 96365 THER/PROPH/DIAG IV INF INIT: CPT

## 2022-09-28 PROCEDURE — 6370000000 HC RX 637 (ALT 250 FOR IP): Performed by: NURSE PRACTITIONER

## 2022-09-28 PROCEDURE — 87040 BLOOD CULTURE FOR BACTERIA: CPT

## 2022-09-28 PROCEDURE — 2580000003 HC RX 258: Performed by: NURSE PRACTITIONER

## 2022-09-28 PROCEDURE — 74177 CT ABD & PELVIS W/CONTRAST: CPT

## 2022-09-28 PROCEDURE — 6360000002 HC RX W HCPCS: Performed by: NURSE PRACTITIONER

## 2022-09-28 PROCEDURE — 96375 TX/PRO/DX INJ NEW DRUG ADDON: CPT

## 2022-09-28 PROCEDURE — 93970 EXTREMITY STUDY: CPT

## 2022-09-28 PROCEDURE — 36415 COLL VENOUS BLD VENIPUNCTURE: CPT

## 2022-09-28 PROCEDURE — 85025 COMPLETE CBC W/AUTO DIFF WBC: CPT

## 2022-09-28 PROCEDURE — 1100000000 HC RM PRIVATE

## 2022-09-28 PROCEDURE — 81003 URINALYSIS AUTO W/O SCOPE: CPT

## 2022-09-28 PROCEDURE — 6360000004 HC RX CONTRAST MEDICATION: Performed by: NURSE PRACTITIONER

## 2022-09-28 PROCEDURE — 2580000003 HC RX 258: Performed by: STUDENT IN AN ORGANIZED HEALTH CARE EDUCATION/TRAINING PROGRAM

## 2022-09-28 PROCEDURE — 6360000002 HC RX W HCPCS: Performed by: STUDENT IN AN ORGANIZED HEALTH CARE EDUCATION/TRAINING PROGRAM

## 2022-09-28 PROCEDURE — 80053 COMPREHEN METABOLIC PANEL: CPT

## 2022-09-28 PROCEDURE — 99285 EMERGENCY DEPT VISIT HI MDM: CPT

## 2022-09-28 RX ORDER — ACETAMINOPHEN 650 MG/1
650 SUPPOSITORY RECTAL EVERY 6 HOURS PRN
Status: DISCONTINUED | OUTPATIENT
Start: 2022-09-28 | End: 2022-09-30 | Stop reason: HOSPADM

## 2022-09-28 RX ORDER — PRENATAL VIT 91/IRON/FOLIC/DHA 28-975-200
COMBINATION PACKAGE (EA) ORAL 2 TIMES DAILY
Status: DISCONTINUED | OUTPATIENT
Start: 2022-09-28 | End: 2022-09-30 | Stop reason: HOSPADM

## 2022-09-28 RX ORDER — ASPIRIN 81 MG/1
81 TABLET ORAL DAILY
Status: DISCONTINUED | OUTPATIENT
Start: 2022-09-29 | End: 2022-09-30 | Stop reason: HOSPADM

## 2022-09-28 RX ORDER — PAROXETINE HYDROCHLORIDE 20 MG/1
30 TABLET, FILM COATED ORAL EVERY EVENING
Status: DISCONTINUED | OUTPATIENT
Start: 2022-09-28 | End: 2022-09-30 | Stop reason: HOSPADM

## 2022-09-28 RX ORDER — 0.9 % SODIUM CHLORIDE 0.9 %
100 INTRAVENOUS SOLUTION INTRAVENOUS
Status: DISCONTINUED | OUTPATIENT
Start: 2022-09-28 | End: 2022-10-02 | Stop reason: HOSPADM

## 2022-09-28 RX ORDER — HYDROCODONE BITARTRATE AND ACETAMINOPHEN 5; 325 MG/1; MG/1
1 TABLET ORAL EVERY 6 HOURS PRN
Status: DISCONTINUED | OUTPATIENT
Start: 2022-09-28 | End: 2022-09-30 | Stop reason: HOSPADM

## 2022-09-28 RX ORDER — HYDROCODONE BITARTRATE AND ACETAMINOPHEN 5; 325 MG/1; MG/1
2 TABLET ORAL EVERY 6 HOURS PRN
Status: DISCONTINUED | OUTPATIENT
Start: 2022-09-28 | End: 2022-09-28

## 2022-09-28 RX ORDER — SODIUM CHLORIDE 9 MG/ML
INJECTION, SOLUTION INTRAVENOUS PRN
Status: DISCONTINUED | OUTPATIENT
Start: 2022-09-28 | End: 2022-09-30 | Stop reason: HOSPADM

## 2022-09-28 RX ORDER — ONDANSETRON 4 MG/1
4 TABLET, ORALLY DISINTEGRATING ORAL EVERY 8 HOURS PRN
Status: DISCONTINUED | OUTPATIENT
Start: 2022-09-28 | End: 2022-09-30 | Stop reason: HOSPADM

## 2022-09-28 RX ORDER — PRENATAL VIT 91/IRON/FOLIC/DHA 28-975-200
COMBINATION PACKAGE (EA) ORAL 2 TIMES DAILY
Status: DISCONTINUED | OUTPATIENT
Start: 2022-09-28 | End: 2022-09-28

## 2022-09-28 RX ORDER — DOCUSATE SODIUM 100 MG/1
100 CAPSULE, LIQUID FILLED ORAL 2 TIMES DAILY PRN
Status: DISCONTINUED | OUTPATIENT
Start: 2022-09-28 | End: 2022-09-30 | Stop reason: HOSPADM

## 2022-09-28 RX ORDER — ONDANSETRON 2 MG/ML
4 INJECTION INTRAMUSCULAR; INTRAVENOUS EVERY 6 HOURS PRN
Status: DISCONTINUED | OUTPATIENT
Start: 2022-09-28 | End: 2022-09-30 | Stop reason: HOSPADM

## 2022-09-28 RX ORDER — ENOXAPARIN SODIUM 100 MG/ML
30 INJECTION SUBCUTANEOUS 2 TIMES DAILY
Status: DISCONTINUED | OUTPATIENT
Start: 2022-09-28 | End: 2022-09-30 | Stop reason: HOSPADM

## 2022-09-28 RX ORDER — SODIUM CHLORIDE 0.9 % (FLUSH) 0.9 %
5-40 SYRINGE (ML) INJECTION EVERY 12 HOURS SCHEDULED
Status: DISCONTINUED | OUTPATIENT
Start: 2022-09-28 | End: 2022-09-30 | Stop reason: HOSPADM

## 2022-09-28 RX ORDER — SODIUM CHLORIDE 0.9 % (FLUSH) 0.9 %
5-40 SYRINGE (ML) INJECTION PRN
Status: DISCONTINUED | OUTPATIENT
Start: 2022-09-28 | End: 2022-09-30 | Stop reason: HOSPADM

## 2022-09-28 RX ORDER — ACETAMINOPHEN 325 MG/1
650 TABLET ORAL EVERY 6 HOURS PRN
Status: DISCONTINUED | OUTPATIENT
Start: 2022-09-28 | End: 2022-09-30 | Stop reason: HOSPADM

## 2022-09-28 RX ORDER — METOPROLOL SUCCINATE 25 MG/1
25 TABLET, EXTENDED RELEASE ORAL DAILY
Status: DISCONTINUED | OUTPATIENT
Start: 2022-09-28 | End: 2022-09-28

## 2022-09-28 RX ORDER — CLOPIDOGREL BISULFATE 75 MG/1
75 TABLET ORAL DAILY
Status: DISCONTINUED | OUTPATIENT
Start: 2022-09-28 | End: 2022-09-28

## 2022-09-28 RX ORDER — MONTELUKAST SODIUM 10 MG/1
10 TABLET ORAL DAILY
Status: DISCONTINUED | OUTPATIENT
Start: 2022-09-29 | End: 2022-09-30 | Stop reason: HOSPADM

## 2022-09-28 RX ORDER — SODIUM CHLORIDE 9 MG/ML
INJECTION, SOLUTION INTRAVENOUS CONTINUOUS
Status: DISCONTINUED | OUTPATIENT
Start: 2022-09-28 | End: 2022-09-30

## 2022-09-28 RX ORDER — METOCLOPRAMIDE HYDROCHLORIDE 5 MG/ML
5 INJECTION INTRAMUSCULAR; INTRAVENOUS EVERY 6 HOURS
Status: DISCONTINUED | OUTPATIENT
Start: 2022-09-28 | End: 2022-09-30 | Stop reason: HOSPADM

## 2022-09-28 RX ORDER — LANOLIN ALCOHOL/MO/W.PET/CERES
100 CREAM (GRAM) TOPICAL DAILY
Status: DISCONTINUED | OUTPATIENT
Start: 2022-09-29 | End: 2022-09-30 | Stop reason: HOSPADM

## 2022-09-28 RX ORDER — LOSARTAN POTASSIUM 50 MG/1
100 TABLET ORAL DAILY
Status: DISCONTINUED | OUTPATIENT
Start: 2022-09-28 | End: 2022-09-28

## 2022-09-28 RX ADMIN — PIPERACILLIN AND TAZOBACTAM 4500 MG: 4; .5 INJECTION, POWDER, FOR SOLUTION INTRAVENOUS at 13:01

## 2022-09-28 RX ADMIN — PIPERACILLIN AND TAZOBACTAM 3375 MG: 3; .375 INJECTION, POWDER, FOR SOLUTION INTRAVENOUS at 19:25

## 2022-09-28 RX ADMIN — IOPAMIDOL 100 ML: 755 INJECTION, SOLUTION INTRAVENOUS at 11:43

## 2022-09-28 RX ADMIN — ONDANSETRON 4 MG: 4 TABLET, ORALLY DISINTEGRATING ORAL at 21:41

## 2022-09-28 RX ADMIN — METOCLOPRAMIDE 5 MG: 5 INJECTION, SOLUTION INTRAMUSCULAR; INTRAVENOUS at 19:29

## 2022-09-28 RX ADMIN — VANCOMYCIN HYDROCHLORIDE 2000 MG: 10 INJECTION, POWDER, LYOPHILIZED, FOR SOLUTION INTRAVENOUS at 13:33

## 2022-09-28 RX ADMIN — PAROXETINE HYDROCHLORIDE 30 MG: 20 TABLET, FILM COATED ORAL at 19:25

## 2022-09-28 RX ADMIN — TERBINAFINE HYDROCHLORIDE: 1 CREAM TOPICAL at 21:11

## 2022-09-28 RX ADMIN — HYDROCODONE BITARTRATE AND ACETAMINOPHEN 1 TABLET: 5; 325 TABLET ORAL at 19:24

## 2022-09-28 RX ADMIN — SODIUM CHLORIDE, PRESERVATIVE FREE 10 ML: 5 INJECTION INTRAVENOUS at 21:09

## 2022-09-28 RX ADMIN — SODIUM CHLORIDE: 9 INJECTION, SOLUTION INTRAVENOUS at 19:25

## 2022-09-28 RX ADMIN — ENOXAPARIN SODIUM 30 MG: 100 INJECTION SUBCUTANEOUS at 21:05

## 2022-09-28 ASSESSMENT — PAIN SCALES - GENERAL
PAINLEVEL_OUTOF10: 9
PAINLEVEL_OUTOF10: 9

## 2022-09-28 ASSESSMENT — PAIN DESCRIPTION - LOCATION: LOCATION: KNEE

## 2022-09-28 ASSESSMENT — PAIN - FUNCTIONAL ASSESSMENT
PAIN_FUNCTIONAL_ASSESSMENT: PREVENTS OR INTERFERES WITH MANY ACTIVE NOT PASSIVE ACTIVITIES
PAIN_FUNCTIONAL_ASSESSMENT: 0-10

## 2022-09-28 ASSESSMENT — PAIN DESCRIPTION - ORIENTATION: ORIENTATION: LEFT

## 2022-09-28 ASSESSMENT — PAIN DESCRIPTION - DESCRIPTORS: DESCRIPTORS: ACHING

## 2022-09-28 NOTE — ED TRIAGE NOTES
Patient with umbilical hernia repair on 9/15 and then on 9/19 he had surgery for SBO. Patient advises he came it today with multiple complaints that he has a wound present to left side of groin states \"raw meat and looks like a growth\". Patient with redness around umbilicus area. Patient also advises severe bilateral knee pain that started yesterday. Masked. Patient has taken Norco 5 mg and aleve around 0700 this morning and vitamin C. Patient also advises testicles have been swollen since surgery.

## 2022-09-28 NOTE — CONSULTS
Consult note    Cassy Estrella MRN: [id number]  MARGARITA:[birth year]  Age:77 y.o. Chief Complaint: Abdominal pain    HPI: Cassy Estrella is a 78 y.o. male who we are asked by ED Doctor  to see for Abdominal pain and Cellulitis noted to prior umbilical surgical site. Patient presented to ED today with multiple complaints including abdominal pain, redness and firmness to surrounding recent umbilical hernia repair site, scrotal swelling BL, left groin rash and Bilateral Knee pain. Patient reports the redness and warmth to the abdominal umbilical area began 2 to 3 days ago but  he denies fever or chills. He reports scant amount of tan drainage from umbilical surgical site on Saturday and Sunday. He reports he was nauseated on 9/26/2022 and took Phenergan and nausea was relieved. Patient reports he is having bowel movements and passing flatus . He denies any blood in stools. This morning,  patient reports he woke up and was having Bilateral knee pain and actually had to use crutches for ambulation. Pt took a Norco 5 mg and an Aleve but the pain did not resolve. Patient reports no known trauma or injury to knees. He also reports having a rash in his left groin area for the past 2 to 3 days and states \"it feels like I have been scraped with a razor blade in my groin. \"  Patient denies any chest pain, dizziness or dyspnea. Patient's surgical history includes  Laparoscopic Bilateral inguinal hernia repair with mesh and umbilical hernia repair with mesh done on 9/15/2022 per Dr. Nhan Montoya and was discharged home same day of surgery. Patient was readmitted on 9/18/2022 due to Abdominal pain/nausea and vomiting and CT scan of Abdomen/Pelvis showed Small Bowel Obstruction. Initially on that admission, patient's SBO was managed with NG tube but on 9/19/2022, patient underwent EXPLORATORY LAPAROTOMY , REVISION OF MESH and LYSIS OF ADHESIONS.   Patient progressed well and was discharged home on 9/23/2022. ER workup done today includes CT scan of Abdomen/Pelvis which shows:  EXAMINATION: CT  ABDOMEN / PELVIS   9/28/2022 11:49 AM       ACCESSION NUMBER:  [id number]       INDICATION:  Abdominal pain with distention. Erythema and firmness at recent umbilical hernia repair site. Recent small bowel obstruction. COMPARISON: CT abdomen and pelvis, 9/18/2022       TECHNIQUE: Contiguous axial computed tomographic images were obtained from the domes of the diaphragm to the symphysis pubis after the intravenous administration of 100 mL of Isovue 370. Coronal and sagittal reconstructions were also performed. Radiation dose reduction techniques were used for this study:  Our CT scanners use one or all of the following: Automated exposure control, adjustment of the mA and/or kVp according to patient's size, iterative reconstruction. FINDINGS:       LOWER THORAX: Minimal bibasilar subsegmental atelectasis. Postsurgical changes of CABG with atherosclerotic calcifications in the native coronary arteries. LIVER: Simple cyst in segment 2 measuring 3.9 cm. There are a few additional smaller hypodensities which are difficult to characterize but stable, likely cysts or hemangiomas. BILIARY: Gallbladder is unremarkable. No intrahepatic or extrahepatic biliary   ductal dilation. SPLEEN: No splenomegaly or concerning lesion. PANCREAS: No pancreatic duct dilation or mass. ADRENALS: No adrenal nodules or hypertrophy. URINARY: Kidneys are normal in size and enhancement. No renal mass. No   hydronephrosis. No renal or ureteral calculi. Urinary bladder is unremarkable. BOWEL: The stomach is normal in caliber and wall thickness. There are several segments of borderline dilated small bowel centrally within the abdomen. A  few segments demonstrate mild wall thickening with mucosal hyperenhancement.        The more distal small bowel is decompressed with the transition point just to the right of midline in the body bilaterally anterior abdomen where there is a   segment of bowel wall thickening. No pneumatosis or free air. Colonic diverticulosis without evidence of diverticulitis. The appendix is normal.                VASCULAR: The abdominal aorta and iliac arterial system are nonaneurysmal with   advanced atherosclerotic calcifications. There is likely high-grade stenosis   involving the proximal SMA due to bulky calcific plaque. NODES: No lymphadenopathy. FLUID: No free intraperitoneal fluid. REPRODUCTIVE: Unremarkable       BONES/SOFT TISSUES: There are postsurgical changes of recent umbilical hernia   repair. There is a new rim-enhancing gas and fluid containing collection at the   hernia repair site measuring 3.6 x 2.9 x 6.7 cm. There is also new fluid deep to   the collection with crescentic morphology, abutting the anterior peritoneal   reflection measuring 5.8 x 2.2 x 2.8 cm, likely in communication with one   another. Subcutis edema along the anterior abdominal wall. No acute or   aggressive osseous abnormality. Impression       1. Recent umbilical hernia repair with a new rim-enhancing gas and fluid   containing collection at the hernia repair site concerning for abscess. This   appears to communicate with an additional area of deeper extraperitoneal fluid   along the anterior peritoneal reflection. 2. Several of the adjacent small bowel segments in the anterior abdomen   demonstrate inflammatory changes and mild dilation with abrupt transition to   decompressed small bowel concerning for developing bowel obstruction. 3. 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 Patient's WBC 12.0 and  is afebrile at this time. Patient was given loading dose of Zosyn 4.5 Grams and Vancomycin 2.0 Grams. Patient had Blood cultures done as well.       Review of System:  Constitutional: Negative. HENT: Negative. Eyes: Negative. Respiratory: Negative. Cardiovascular: Negative. Gastrointestinal: Positive for abdominal pain and bloating. Endocrine: Negative. Genitourinary:  Negative for dysuria or pain. Positive for mild scrotal edema. Musculoskeletal: Positive for BL knee pain  Skin: Positive for erythema and warmth to Abdominal area/umbilical surgical site/erythema to left groin area  Allergic/Immunologic: Negative. Neurological: Negative. Hematological: Negative. Psychiatric/Behavioral: Negative. Comprehensive review of systems was otherwise unremarkable except as noted above. Past Medical History:   Diagnosis Date    Anemia     Anxiety     Asthma     childhood    BMI 33.0-33.9,adult     CAD (coronary artery disease)     CABG/Stents; denies MI; followed by [facility]    Calcification of abdominal aorta (HCC)     Cardiac arrhythmia     atrial fib    Chronic kidney disease     Dyslipidemia     Former smoker     GERD (gastroesophageal reflux disease)     Gout     HLD (hyperlipidemia)     Hypercholesterolemia     Hypertension     Insomnia     Liver disease     ?? growth on liver    Mild episode of recurrent major depressive disorder (HCC)     HAY (obstructive sleep apnea)     non-compliant with c-pap    Paroxysmal atrial fibrillation ([address]     Poor historian     Prostatism     PUD (peptic ulcer disease)     HX    Stroke ([address]     DENIES (noted 9/13/22)     Past Surgical History:   Procedure Laterality Date    CABG, ARTERIAL, THREE  4/13/11    x3    CARDIAC CATHETERIZATION  09/06/2011    CORONARY ANGIOPLASTY WITH STENT PLACEMENT  09/16/2019    patient thinks 4 total stents; last in 2019=Successful angioplasty and stenting of the vein graft to the RCA.     INGUINAL HERNIA REPAIR Bilateral 9/15/2022    LAPAROSCOPIC BILATERAL INGUINAL HERNIA REPAIR WITH MESH performed by Rose Mary Sargent MD at [address] [place] [address] N/A 9/19/2022 LAPAROTOMY EXPLORATORY, REVISION OF MESH, LYSIS OF ADHESIONS performed by Chris Chandler MD at [address]      right shoulder, left knee, and left foot    SINUS SURGERY      TESTICLE SURGERY      UMBILICAL HERNIA REPAIR N/A [id number]    HERNIA UMBILICAL REPAIR performed by Chris Chandler MD at [address] [facility]       Current Facility-Administered Medications   Medication Dose Route Frequency    vancomycin (VANCOCIN) 2000 mg in 0.9% sodium chloride 500 mL IVPB  2,000 mg IntraVENous NOW     Current Outpatient Medications   Medication Sig    promethazine (PHENERGAN) 12.5 MG tablet Take 1 tablet by mouth 3 times daily as needed for Nausea    docusate sodium (COLACE, DULCOLAX) 100 MG CAPS Take 100 mg by mouth 2 times daily as needed for Constipation    aspirin 81 MG EC tablet Take 81 mg by mouth in the morning. APPLE CIDER VINEGAR PO Take by mouth    GARLIC PO Take by mouth    HAWTHORN BERRY PO Take by mouth    LECITHIN PO Take by mouth    alirocumab (PRALUENT) 75 MG/ML SOAJ injection pen Inject 75 mg into the skin every 14 days    ascorbic acid (VITAMIN C) 500 MG tablet Take 1,000 mg by mouth    clopidogrel (PLAVIX) 75 MG tablet Take 75 mg by mouth in the morning. Coenzyme Q10 10 MG CAPS Take by mouth    cyanocobalamin 100 MCG tablet Take 400 mcg by mouth daily    LORazepam (ATIVAN) 1 MG tablet Take 1 mg by mouth 2 times daily as needed. losartan (COZAAR) 100 MG tablet Take 100 mg by mouth in the morning. (Patient not taking: Reported on 9/15/2022)    metoprolol succinate (TOPROL XL) 25 MG extended release tablet Take 25 mg by mouth in the morning.  (Patient not taking: Reported on 9/15/2022)    montelukast (SINGULAIR) 10 MG tablet TAKE 1 TABLET BY MOUTH EVERY DAY    PARoxetine (PAXIL) 20 MG tablet Take 30 mg by mouth every evening    potassium chloride (KLOR-CON M) 20 MEQ extended release tablet Take 20 mEq by mouth daily as needed    thiamine 100 MG tablet Take 100 mg by mouth daily    vitamin E 400 UNIT capsule Take by mouth daily     Facility-Administered Medications Ordered in Other Encounters   Medication Dose Route Frequency    0.9 % sodium chloride bolus  100 mL IntraVENous ONCE PRN       ALLERGIES:  Indomethacin, Atorvastatin, and Rosuvastatin    Social History     Socioeconomic History    Marital status:    Tobacco Use    Smoking status: Former     Packs/day: 1.50     Types: Cigarettes     Quit date: 1970     Years since quittin.4    Smokeless tobacco: Former     Quit date: 2018    Tobacco comments:     Quit smoking: chews tobacco   Vaping Use    Vaping Use: Never used   Substance and Sexual Activity    Alcohol use: Yes     Comment: occ    Drug use: No       Family History   Problem Relation Age of Onset    Alzheimer's Disease Mother     Stroke Father     Heart Disease Father        OBJECTIVE:     /65   Pulse 66   Temp 98.4 °F (36.9 °C) (Oral)   Resp 16   Ht 5' 10\" (1.778 m)   Wt 232 lb (105.2 kg)   SpO2 98%   BMI 33.29 kg/m²     Physical Exam:    Vitals and nursing note reviewed  Constitutional:       General: Awake, alert and resting on stretcher . HENT:      Head: Normocephalic and atraumatic. Right Ear: External ear normal.      Left Ear: External ear normal.      Nose: No congestion or rhinorrhea     Mouth: Mucous membranes are moist.   Eyes:      Extraocular Movements: Extraocular movements intact. Pupils: Pupils are equal, round, and reactive to light. Cardiovascular:      Rate and Rhythm: Normal rate and regular rhythm. Heart sounds: Normal heart sounds. Pulmonary:      Effort: Pulmonary effort is normal.      Breath sounds: CTA BL with no wheezing, rales or rhonchi. Abdominal:      General: There is Mild distention noted with + bowel sounds x 4 quads. Palpations: Abdomen is obese. No HSM noted.    Mild tenderness noted to umbilical surgical site which is approximated with steri-strips and old dried blood noted to site. There is surrounding erythema, warmth and induration noted to periumbilical surgical site which extends in circular area of umbilicus     Musculoskeletal:         General: No swelling or tenderness. Normal range of motion to all joints but patient reports pain with ambulation to BL knees. Negative boni's sign. No Erythema to Knees. Cap refill < 2 seconds. NVI distally. Skin:     Left inguinal fold area with moist, exudative rash. Mild scrotal edema present. Neurological:      General: No focal deficit present. Mental Status: He is alert and oriented to person, place, and time. Psychiatric:         Mood and Affect: Mood normal.     Labs:  Recent Labs     09/28/22  1042   WBC 12.0*   HGB 12.2*         K 3.5      CO2 28   BUN 9   ALT 17       I reviewed recent labs and recent radiologic studies. ASSESSMENT:  -Abdominal Pain  -Bilateral Knee Pain  -Tinea Cruris   Active Problems:    * No active hospital problems. *  Resolved Problems:    * No resolved hospital problems.  *       PLAN:  -Further input per attending surgeon related to Abdominal pain/Surgical site Cellulitis   -Continue Zosyn IV  -Trend Labs  -Hospitalist for Admit Team for Medical management related to Medical history/med/further evaluation of BL knee pain  -NPO for now  -Pain Control  -Antifungal cream for Left groin rash    Signed:  JEANA Jones - NP

## 2022-09-28 NOTE — H&P
Hospitalist History and Physical   Admit Date:  2022  9:56 AM   Name:  Gene Valdes. Age:  78 y.o. Sex:  male  :  1945   MRN:  [id number]   Room:  05/    Presenting Complaint: Post-op Problem and Knee Pain     Reason(s) for Admission: Postoperative wound infection [T81.49XA]     History of Present Illness:   Gene Terry is a 78 y.o. male with medical history of PAF, HTN, CAD, Depression who presented with c/o erythema, drainage from umbilical surgical incision. Pt had recent admission for laparoscopic bilateral inguinal hernia repair with mesh and umbilical hernia repair with mesh 9/15 per Dr. Rosario Lau.  pt was readmitted for n/v, SBO requiring ex lap, revision of mesh and lysis of adhesions, DC . CT scan this admission concerning for developing SBO and findings concerning for abscess. Given Vanc and Zosyn in ED. Gen Surg at bedside in ED. Denies CP, SOB, n/v/d, abd pain. C/o left groin rash and testicular swelling. Review of Systems:  10 systems reviewed and negative except as noted in HPI. Assessment & Plan:     Principal Problem:    Postoperative wound infection  General Surgery consulted  On Zosyn per Gen Surg recs  Full liquid diet per Gen Surg recs  IVF  Scheduled reglan per Gen Surg recs    Rash groin  Antifungal cream    Bilateral knee pain  Will check bilateral duplex LE     PAF  Home meds  [place] [facility] [place]  Home meds  metoprolol/losartan      CAD  Home meds  ASA/Plavix/BB  Praluent    Depression  Home meds paxil            Anticipated discharge needs:     Pending clinical course    Diet: full liquid  VTE ppx: lovenox  Code status: Full    Hospital Problems:  Principal Problem:    Postoperative wound infection  Resolved Problems:    * No resolved hospital problems.  *       Past History:     Past Medical History:   Diagnosis Date    Anemia     Anxiety     Asthma     childhood    BMI 33.0-33.9,adult     CAD (coronary artery disease)     CABG/Stents; denies MI; followed by [facility]    Calcification of abdominal aorta (HCC)     Cardiac arrhythmia     atrial fib    Chronic kidney disease     Dyslipidemia     Former smoker     GERD (gastroesophageal reflux disease)     Gout     HLD (hyperlipidemia)     Hypercholesterolemia     Hypertension     Insomnia     Liver disease     ?? growth on liver    Mild episode of recurrent major depressive disorder (HCC)     HAY (obstructive sleep apnea)     non-compliant with c-pap    Paroxysmal atrial fibrillation ([address]     Poor historian     Prostatism     PUD (peptic ulcer disease)     HX    Stroke ([address]     DENIES (noted 22)       Past Surgical History:   Procedure Laterality Date    CABG, ARTERIAL, THREE  4/13/11    x3    CARDIAC CATHETERIZATION  2011    CORONARY ANGIOPLASTY WITH STENT PLACEMENT  2019    patient thinks 4 total stents; last in 2019=Successful angioplasty and stenting of the vein graft to the RCA.     INGUINAL HERNIA REPAIR Bilateral 9/15/2022    LAPAROSCOPIC BILATERAL INGUINAL HERNIA REPAIR WITH MESH performed by Vitaliy Alaniz MD at [address] [place] [address] N/A 2022    LAPAROTOMY EXPLORATORY, REVISION OF MESH, LYSIS OF ADHESIONS performed by Vitaliy Alaniz MD at [address] [place] [address]      right shoulder, left knee, and left foot    SINUS SURGERY      TESTICLE SURGERY      UMBILICAL HERNIA REPAIR N/A 498    HERNIA UMBILICAL REPAIR performed by Vitaliy Alaniz MD at [facility]          Social History     Tobacco Use    Smoking status: Former     Packs/day: 1.50     Types: Cigarettes     Quit date: 1970     Years since quittin.4    Smokeless tobacco: Former     Quit date: 2018    Tobacco comments:     Quit smoking: chews tobacco   Substance Use Topics    Alcohol use: Yes     Comment: occ      Social History     Substance and Sexual Activity   Drug Use No       Family History   Problem Relation Age of Onset    Alzheimer's Disease Mother     Stroke Father     Heart Disease Father         Immunization History   Administered Date(s) Administered    COVID-19, PFIZER PURPLE top, DILUTE for use, (age 15 y+), 30mcg/0.3mL 02/16/2021, 03/22/2021    Influenza, FLUARIX, FLULAVAL, FLUZONE (age 10 mo+) AND AFLURIA, (age 1 y+), PF, 0.5mL 09/17/2019    Influenza, High Dose (Fluzone 65 yrs and older) 10/03/2016    Pneumococcal Conjugate 13-valent ([id number]) 11/01/2016     Allergies   Allergen Reactions    Indomethacin Shortness Of Breath     Severe dizziness    Atorvastatin Other (See Comments)    Rosuvastatin Other (See Comments)     CONFUSION     Prior to Admit Medications:  Current Outpatient Medications   Medication Instructions    APPLE CIDER VINEGAR PO Oral    ascorbic acid (VITAMIN C) 1,000 mg, Oral    aspirin 81 mg, Oral, DAILY    clopidogrel (PLAVIX) 75 mg, Oral, DAILY    Coenzyme Q10 10 MG CAPS Oral    cyanocobalamin 400 mcg, Oral, DAILY    docusate (COLACE, DULCOLAX) 100 mg, Oral, 2 TIMES DAILY PRN    GARLIC PO Oral    HAWTHORN CAVAZOS PO Oral    LECITHIN PO Oral    LORazepam (ATIVAN) 1 mg, Oral, 2 TIMES DAILY PRN    losartan (COZAAR) 100 mg, DAILY    metoprolol succinate (TOPROL XL) 25 mg, DAILY    montelukast (SINGULAIR) 10 MG tablet TAKE 1 TABLET BY MOUTH EVERY DAY    PARoxetine (PAXIL) 30 mg, Oral, EVERY EVENING    potassium chloride (KLOR-CON M) 20 MEQ extended release tablet 20 mEq, Oral, DAILY PRN    Praluent 75 mg, SubCUTAneous, EVERY 14 DAYS    promethazine (PHENERGAN) 12.5 mg, Oral, 3 TIMES DAILY PRN    thiamine 100 mg, Oral, DAILY    vitamin E 400 UNIT capsule Oral, DAILY         Objective:   Patient Vitals for the past 24 hrs:   Temp Pulse Resp BP SpO2   09/28/22 1526 -- 71 -- 137/71 98 %   09/28/22 1432 -- 69 -- 126/61 98 %   09/28/22 1339 -- 66 16 132/65 98 %   09/28/22 1237 98.4 °F (36.9 °C) 84 16 134/70 99 %   09/28/22 0950 98.6 °F (37 °C) 81 16 130/73 99 %       Oxygen Therapy  SpO2: 98 %  O2 Device: None (Room air)    Estimated body mass index is 33.29 kg/m² as calculated from the following:    Height as of this encounter: 5' 10\" (1.778 m). Weight as of this encounter: 232 lb (105.2 kg). No intake or output data in the 24 hours ending 09/28/22 1631      Physical Exam:    Blood pressure 137/71, pulse 71, temperature 98.4 °F (36.9 °C), temperature source Oral, resp. rate 16, height 5' 10\" (1.778 m), weight 232 lb (105.2 kg), SpO2 98 %. General:    Well nourished. Head:  Normocephalic, atraumatic  Eyes:  Sclerae appear normal.  Pupils equally round. ENT:  Nares appear normal, no drainage. Moist oral mucosa  Neck:  No restricted ROM. Trachea midline   CV:   RRR. No m/r/g. No jugular venous distension. Lungs:   CTAB. No wheezing, rhonchi, or rales. Symmetric expansion. Abdomen: Bowel sounds present. Soft, tender, distended. Periumbilical erythema with induration. Extremities: No cyanosis or clubbing. No edema. Neg boni's sign. Normal ROM bilateral LE. 2+ pedal pulses bilaterally. Skin:     No rashes and normal coloration. Warm and dry. Neuro:  CN II-XII grossly intact. Sensation intact. A&Ox3  Psych:  Normal mood and affect.       I have personally reviewed labs and tests showing:  Recent Labs:  Recent Results (from the past 24 hour(s))   CBC with Auto Differential    Collection Time: 09/28/22 10:42 AM   Result Value Ref Range    WBC 12.0 (H) 4.3 - 11.1 K/uL    RBC 4.32 4.23 - 5.6 M/uL    Hemoglobin 12.2 (L) 13.6 - 17.2 g/dL    Hematocrit 37.1 (L) 41.1 - 50.3 %    MCV 85.9 79.6 - 97.8 FL    MCH 28.2 26.1 - 32.9 PG    MCHC 32.9 31.4 - 35.0 g/dL    RDW 12.6 11.9 - 14.6 %    Platelets 366 970 - 170 K/uL    MPV 8.6 (L) 9.4 - 12.3 FL    nRBC 0.00 0.0 - 0.2 K/uL    Differential Type AUTOMATED      Seg Neutrophils 72 43 - 78 %    Lymphocytes 13 13 - 44 %    Monocytes 13 (H) 4.0 - 12.0 %    Eosinophils % 2 0.5 - 7.8 %    Basophils 0 0.0 - 2.0 %    Immature Granulocytes 1 0.0 - 5.0 %    Segs Absolute 8.6 (H) 1.7 - 8.2 K/UL    Absolute Lymph # 1.5 0.5 - 4.6 K/UL    Absolute Mono # 1.5 (H) 0.1 - 1.3 K/UL    Absolute Eos # 0.2 0.0 - 0.8 K/UL    Basophils Absolute 0.1 0.0 - 0.2 K/UL    Absolute Immature Granulocyte 0.1 0.0 - 0.5 K/UL   CMP    Collection Time: 09/28/22 10:42 AM   Result Value Ref Range    Sodium 138 136 - 145 mmol/L    Potassium 3.5 3.5 - 5.1 mmol/L    Chloride 103 101 - 110 mmol/L    CO2 28 21 - 32 mmol/L    Anion Gap 7 4 - 13 mmol/L    Glucose 125 (H) 65 - 100 mg/dL    BUN 9 8 - 23 MG/DL    Creatinine 1.00 0.8 - 1.5 MG/DL    GFR African American >60 >60 ml/min/1.73m2    GFR Non- >60 >60 ml/min/1.73m2    Calcium 9.1 8.3 - 10.4 MG/DL    Total Bilirubin 0.5 0.2 - 1.1 MG/DL    ALT 17 12 - 65 U/L    AST 13 (L) 15 - 37 U/L    Alk Phosphatase 54 50 - 136 U/L    Total Protein 6.6 6.3 - 8.2 g/dL    Albumin 2.9 (L) 3.2 - 4.6 g/dL    Globulin 3.7 (H) 2.3 - 3.5 g/dL    Albumin/Globulin Ratio 0.8 (L) 1.2 - 3.5     Lipase    Collection Time: 09/28/22 10:42 AM   Result Value Ref Range    Lipase 174 73 - 393 U/L   Lactic Acid    Collection Time: 09/28/22 10:42 AM   Result Value Ref Range    Lactic Acid, Plasma 0.9 0.4 - 2.0 MMOL/L   Urinalysis w rflx microscopic    Collection Time: 09/28/22  1:39 PM   Result Value Ref Range    Color, UA YELLOW/STRAW      Appearance CLEAR      Specific Gravity, UA 1.021 1.001 - 1.023      pH, Urine 6.5 5.0 - 9.0      Protein, UA Negative NEG mg/dL    Glucose, UA Negative mg/dL    Ketones, Urine Negative NEG mg/dL    Bilirubin Urine Negative NEG      Blood, Urine Negative NEG      Urobilinogen, Urine 0.2 0.2 - 1.0 EU/dL    Nitrite, Urine Negative NEG      Leukocyte Esterase, Urine Negative NEG         I have personally reviewed imaging studies showing:  CT ABDOMEN PELVIS W IV CONTRAST Additional Contrast? None    Result Date: 9/28/2022  EXAMINATION: CT  ABDOMEN / PELVIS   9/28/2022 11:49 AM ACCESSION NUMBER:  [id number] INDICATION:  Abdominal pain with distention.  Erythema and firmness at recent umbilical hernia repair site. Recent small bowel obstruction. COMPARISON: CT abdomen and pelvis, 9/18/2022 TECHNIQUE: Contiguous axial computed tomographic images were obtained from the domes of the diaphragm to the symphysis pubis after the intravenous administration of 100 mL of Isovue 370. Coronal and sagittal reconstructions were also performed. Radiation dose reduction techniques were used for this study:  Our CT scanners use one or all of the following: Automated exposure control, adjustment of the mA and/or kVp according to patient's size, iterative reconstruction. FINDINGS: LOWER THORAX: Minimal bibasilar subsegmental atelectasis. Postsurgical changes of CABG with atherosclerotic calcifications in the native coronary arteries. LIVER: Simple cyst in segment 2 measuring 3.9 cm. There are a few additional smaller hypodensities which are difficult to characterize but stable, likely cysts or hemangiomas. BILIARY: Gallbladder is unremarkable. No intrahepatic or extrahepatic biliary ductal dilation. SPLEEN: No splenomegaly or concerning lesion. PANCREAS: No pancreatic duct dilation or mass. ADRENALS: No adrenal nodules or hypertrophy. URINARY: Kidneys are normal in size and enhancement. No renal mass. No hydronephrosis. No renal or ureteral calculi. Urinary bladder is unremarkable. BOWEL: The stomach is normal in caliber and wall thickness. There are several segments of borderline dilated small bowel centrally within the abdomen. A few segments demonstrate mild wall thickening with mucosal hyperenhancement. The more distal small bowel is decompressed with the transition point just to the right of midline in the body bilaterally anterior abdomen where there is a segment of bowel wall thickening. No pneumatosis or free air. Colonic diverticulosis without evidence of diverticulitis.  The appendix is normal. VASCULAR: The abdominal aorta and iliac arterial system are nonaneurysmal with advanced atherosclerotic calcifications. There is likely high-grade stenosis involving the proximal SMA due to bulky calcific plaque. NODES: No lymphadenopathy. FLUID: No free intraperitoneal fluid. REPRODUCTIVE: Unremarkable BONES/SOFT TISSUES: There are postsurgical changes of recent umbilical hernia repair. There is a new rim-enhancing gas and fluid containing collection at the hernia repair site measuring 3.6 x 2.9 x 6.7 cm. There is also new fluid deep to the collection with crescentic morphology, abutting the anterior peritoneal reflection measuring 5.8 x 2.2 x 2.8 cm, likely in communication with one another. Subcutis edema along the anterior abdominal wall. No acute or aggressive osseous abnormality. 1. Recent umbilical hernia repair with a new rim-enhancing gas and fluid containing collection at the hernia repair site concerning for abscess. This appears to communicate with an additional area of deeper extraperitoneal fluid along the anterior peritoneal reflection. 2. Several of the adjacent small bowel segments in the anterior abdomen demonstrate inflammatory changes and mild dilation with abrupt transition to decompressed small bowel concerning for developing bowel obstruction. 3. Advanced atherosclerosis with probable high-grade stenosis in the proximal SMA. Echocardiogram:  No results found for this or any previous visit.         Orders Placed This Encounter   Medications    vancomycin (VANCOCIN) 2000 mg in 0.9% sodium chloride 500 mL IVPB     Order Specific Question:   Antimicrobial Indications     Answer:   Intra-Abdominal Infection    DISCONTD: piperacillin-tazobactam (ZOSYN) 3,375 mg in sodium chloride 0.9 % 50 mL IVPB (mini-bag)     Order Specific Question:   Antimicrobial Indications     Answer:   Intra-Abdominal Infection    piperacillin-tazobactam (ZOSYN) 4,500 mg in sodium chloride 0.9 % 100 mL IVPB (mini-bag)     Order Specific Question:   Antimicrobial Indications     Answer: Intra-Abdominal Infection    terbinafine (LAMISIL) 1 % cream    piperacillin-tazobactam (ZOSYN) 3,375 mg in sodium chloride 0.9 % 50 mL IVPB (mini-bag)     Order Specific Question:   Antimicrobial Indications     Answer:   Surgical Site Infection    DISCONTD: HYDROcodone-acetaminophen (NORCO) 5-325 MG per tablet 2 tablet    HYDROcodone-acetaminophen (NORCO) 5-325 MG per tablet 1 tablet         Signed:  JEANA Ivey - CNP    Notes, labs, VS, diagnostic testing reviewed  Case discussed with pt, care team, Dr. Vicky Marsh, wife at bedside

## 2022-09-28 NOTE — ED NOTES
TRANSFER - OUT REPORT:    Verbal report given to Gaby Valdes RN on [employer].  being transferred to [facility] for routine progression of patient care       Report consisted of patient's Situation, Background, Assessment and   Recommendations(SBAR). Information from the following report(s) Nurse Handoff Report and MAR was reviewed with the receiving nurse. Lines:   Peripheral IV 09/28/22 Left Antecubital (Active)        Opportunity for questions and clarification was provided.       Patient transported with:         Lucille Todd RN  09/28/22 2582

## 2022-09-29 ENCOUNTER — APPOINTMENT (OUTPATIENT)
Dept: GENERAL RADIOLOGY | Age: 77
DRG: 862 | End: 2022-09-29
Payer: MEDICARE

## 2022-09-29 LAB
ANION GAP SERPL CALC-SCNC: 7 MMOL/L (ref 4–13)
BASOPHILS # BLD: 0 K/UL (ref 0–0.2)
BASOPHILS NFR BLD: 0 % (ref 0–2)
BUN SERPL-MCNC: 11 MG/DL (ref 8–23)
CALCIUM SERPL-MCNC: 8.4 MG/DL (ref 8.3–10.4)
CHLORIDE SERPL-SCNC: 105 MMOL/L (ref 101–110)
CO2 SERPL-SCNC: 27 MMOL/L (ref 21–32)
CREAT SERPL-MCNC: 1.39 MG/DL (ref 0.8–1.5)
DIFFERENTIAL METHOD BLD: ABNORMAL
EOSINOPHIL # BLD: 0.3 K/UL (ref 0–0.8)
EOSINOPHIL NFR BLD: 3 % (ref 0.5–7.8)
ERYTHROCYTE [DISTWIDTH] IN BLOOD BY AUTOMATED COUNT: 12.6 % (ref 11.9–14.6)
GLUCOSE SERPL-MCNC: 119 MG/DL (ref 65–100)
HCT VFR BLD AUTO: 35.4 % (ref 41.1–50.3)
HGB BLD-MCNC: 11.4 G/DL (ref 13.6–17.2)
IMM GRANULOCYTES # BLD AUTO: 0.1 K/UL (ref 0–0.5)
IMM GRANULOCYTES NFR BLD AUTO: 1 % (ref 0–5)
LYMPHOCYTES # BLD: 1.1 K/UL (ref 0.5–4.6)
LYMPHOCYTES NFR BLD: 10 % (ref 13–44)
MCH RBC QN AUTO: 28.2 PG (ref 26.1–32.9)
MCHC RBC AUTO-ENTMCNC: 32.2 G/DL (ref 31.4–35)
MCV RBC AUTO: 87.6 FL (ref 79.6–97.8)
MONOCYTES # BLD: 0.7 K/UL (ref 0.1–1.3)
MONOCYTES NFR BLD: 6 % (ref 4–12)
NEUTS SEG # BLD: 8.5 K/UL (ref 1.7–8.2)
NEUTS SEG NFR BLD: 79 % (ref 43–78)
NRBC # BLD: 0 K/UL (ref 0–0.2)
PLATELET # BLD AUTO: 405 K/UL (ref 150–450)
PMV BLD AUTO: 8.9 FL (ref 9.4–12.3)
POTASSIUM SERPL-SCNC: 3.6 MMOL/L (ref 3.5–5.1)
RBC # BLD AUTO: 4.04 M/UL (ref 4.23–5.6)
SODIUM SERPL-SCNC: 139 MMOL/L (ref 136–145)
URATE SERPL-MCNC: 5.6 MG/DL (ref 2.6–6)
WBC # BLD AUTO: 10.7 K/UL (ref 4.3–11.1)

## 2022-09-29 PROCEDURE — 6360000002 HC RX W HCPCS: Performed by: NURSE PRACTITIONER

## 2022-09-29 PROCEDURE — 36415 COLL VENOUS BLD VENIPUNCTURE: CPT

## 2022-09-29 PROCEDURE — 6370000000 HC RX 637 (ALT 250 FOR IP): Performed by: NURSE PRACTITIONER

## 2022-09-29 PROCEDURE — 97165 OT EVAL LOW COMPLEX 30 MIN: CPT

## 2022-09-29 PROCEDURE — 97535 SELF CARE MNGMENT TRAINING: CPT

## 2022-09-29 PROCEDURE — 85025 COMPLETE CBC W/AUTO DIFF WBC: CPT

## 2022-09-29 PROCEDURE — 97530 THERAPEUTIC ACTIVITIES: CPT

## 2022-09-29 PROCEDURE — 2580000003 HC RX 258: Performed by: HOSPITALIST

## 2022-09-29 PROCEDURE — 80048 BASIC METABOLIC PNL TOTAL CA: CPT

## 2022-09-29 PROCEDURE — 1100000000 HC RM PRIVATE

## 2022-09-29 PROCEDURE — 97161 PT EVAL LOW COMPLEX 20 MIN: CPT

## 2022-09-29 PROCEDURE — 73564 X-RAY EXAM KNEE 4 OR MORE: CPT

## 2022-09-29 PROCEDURE — 2580000003 HC RX 258: Performed by: NURSE PRACTITIONER

## 2022-09-29 PROCEDURE — 84550 ASSAY OF BLOOD/URIC ACID: CPT

## 2022-09-29 PROCEDURE — 71046 X-RAY EXAM CHEST 2 VIEWS: CPT

## 2022-09-29 RX ORDER — L. ACIDOPHILUS/L.BULGARICUS 1MM CELL
4 TABLET ORAL 3 TIMES DAILY
Status: DISCONTINUED | OUTPATIENT
Start: 2022-09-29 | End: 2022-09-30 | Stop reason: HOSPADM

## 2022-09-29 RX ORDER — AMOXICILLIN AND CLAVULANATE POTASSIUM 875; 125 MG/1; MG/1
1 TABLET, FILM COATED ORAL EVERY 12 HOURS SCHEDULED
Status: DISCONTINUED | OUTPATIENT
Start: 2022-09-29 | End: 2022-09-30 | Stop reason: HOSPADM

## 2022-09-29 RX ADMIN — LACTOBACILLUS TAB 4 TABLET: TAB at 20:46

## 2022-09-29 RX ADMIN — PIPERACILLIN AND TAZOBACTAM 3375 MG: 3; .375 INJECTION, POWDER, FOR SOLUTION INTRAVENOUS at 06:50

## 2022-09-29 RX ADMIN — METOCLOPRAMIDE 5 MG: 5 INJECTION, SOLUTION INTRAMUSCULAR; INTRAVENOUS at 11:45

## 2022-09-29 RX ADMIN — AMOXICILLIN AND CLAVULANATE POTASSIUM 1 TABLET: 875; 125 TABLET, FILM COATED ORAL at 20:46

## 2022-09-29 RX ADMIN — HYDROCODONE BITARTRATE AND ACETAMINOPHEN 1 TABLET: 5; 325 TABLET ORAL at 23:19

## 2022-09-29 RX ADMIN — ENOXAPARIN SODIUM 30 MG: 100 INJECTION SUBCUTANEOUS at 20:47

## 2022-09-29 RX ADMIN — METOCLOPRAMIDE 5 MG: 5 INJECTION, SOLUTION INTRAMUSCULAR; INTRAVENOUS at 17:04

## 2022-09-29 RX ADMIN — SODIUM CHLORIDE: 9 INJECTION, SOLUTION INTRAVENOUS at 03:35

## 2022-09-29 RX ADMIN — MONTELUKAST 10 MG: 10 TABLET, FILM COATED ORAL at 08:56

## 2022-09-29 RX ADMIN — TERBINAFINE HYDROCHLORIDE: 1 CREAM TOPICAL at 20:44

## 2022-09-29 RX ADMIN — Medication 100 MG: at 08:56

## 2022-09-29 RX ADMIN — SODIUM CHLORIDE: 9 INJECTION, SOLUTION INTRAVENOUS at 20:47

## 2022-09-29 RX ADMIN — METOCLOPRAMIDE 5 MG: 5 INJECTION, SOLUTION INTRAMUSCULAR; INTRAVENOUS at 00:02

## 2022-09-29 RX ADMIN — ENOXAPARIN SODIUM 30 MG: 100 INJECTION SUBCUTANEOUS at 08:56

## 2022-09-29 RX ADMIN — PAROXETINE HYDROCHLORIDE 30 MG: 20 TABLET, FILM COATED ORAL at 17:04

## 2022-09-29 RX ADMIN — METOCLOPRAMIDE 5 MG: 5 INJECTION, SOLUTION INTRAMUSCULAR; INTRAVENOUS at 06:58

## 2022-09-29 RX ADMIN — ASPIRIN 81 MG: 81 TABLET, COATED ORAL at 08:56

## 2022-09-29 RX ADMIN — TERBINAFINE HYDROCHLORIDE: 1 CREAM TOPICAL at 08:57

## 2022-09-29 RX ADMIN — METOCLOPRAMIDE 5 MG: 5 INJECTION, SOLUTION INTRAMUSCULAR; INTRAVENOUS at 23:19

## 2022-09-29 RX ADMIN — ACETAMINOPHEN 650 MG: 325 TABLET, FILM COATED ORAL at 02:49

## 2022-09-29 RX ADMIN — PIPERACILLIN AND TAZOBACTAM 3375 MG: 3; .375 INJECTION, POWDER, FOR SOLUTION INTRAVENOUS at 11:45

## 2022-09-29 ASSESSMENT — PAIN DESCRIPTION - DESCRIPTORS
DESCRIPTORS: SORE;ACHING
DESCRIPTORS: ACHING

## 2022-09-29 ASSESSMENT — PAIN DESCRIPTION - LOCATION
LOCATION: KNEE
LOCATION: LEG

## 2022-09-29 ASSESSMENT — PAIN SCALES - GENERAL
PAINLEVEL_OUTOF10: 3
PAINLEVEL_OUTOF10: 6

## 2022-09-29 ASSESSMENT — PAIN - FUNCTIONAL ASSESSMENT
PAIN_FUNCTIONAL_ASSESSMENT: PREVENTS OR INTERFERES SOME ACTIVE ACTIVITIES AND ADLS
PAIN_FUNCTIONAL_ASSESSMENT: ACTIVITIES ARE NOT PREVENTED

## 2022-09-29 ASSESSMENT — PAIN DESCRIPTION - ORIENTATION: ORIENTATION: LEFT

## 2022-09-29 NOTE — CARE COORDINATION
Medical record reviewed. Pt is a 79 y/o male admitted with a wound infection. Pt is familiar to this  from previous admission (9/18-9/23) for evaluation / repair of hernia. On previous admission, pt underwent a exploratory lap which is now infected. Pt stated, \"it's like a volcano full of puss, about to explode\". Other than this complication, pt and wife reported that he was managing well at home. A follow up appointment with surgeon was set up for today at 10/20, however patient was admitted prior to this time. CM will continue to follow to assist with planning. Treatment plan is pending at this time. 09/29/22 0911   Service Assessment   Patient Orientation Alert and Oriented;Person;Place;Situation;Self   Cognition Alert   History Provided By Patient   Primary Caregiver Self   Accompanied By/Relationship Yulia Liriano, wife   Support Systems Spouse/Significant Other   PCP Verified by CM Yes   Last Visit to PCP Within last 3 months   Prior Functional Level Independent in ADLs/IADLs   Current Functional Level Assistance with the following:;Cooking   Can patient return to prior living arrangement Yes   Ability to make needs known: Good   Family able to assist with home care needs: Yes   Social/Functional History   Lives With Spouse   Type of [address]   Transfer Assistance Independent   Occupation Retired   Discharge Planning   Type of [address] Medications No   Type of [address] None   Patient expects to be discharged to: [address] Discharge   [address] Provided?  No   Mode of Transport at Discharge Self   Confirm Follow Up Transport Self   Condition of Participation: Discharge Planning   The Plan for Transition of Care is related to the following treatment goals: return to prior level of care

## 2022-09-29 NOTE — PROGRESS NOTES
General Surgery Progress Note    9/29/2022    Admit Date: 9/28/2022      Chief Complaint: Abdominal pain     HPI: Elizabeth Soliman is a 78 y.o. male who we are asked by ED Doctor  to see for Abdominal pain and Cellulitis noted to prior umbilical surgical site. Patient presented to ED today with multiple complaints including abdominal pain, redness and firmness to surrounding recent umbilical hernia repair site, scrotal swelling BL, left groin rash and Bilateral Knee pain. Patient reports the redness and warmth to the abdominal umbilical area began 2 to 3 days ago but  he denies fever or chills. He reports scant amount of tan drainage from umbilical surgical site on Saturday and Sunday. He reports he was nauseated on 9/26/2022 and took Phenergan and nausea was relieved. Patient reports he is having bowel movements and passing flatus . He denies any blood in stools. This morning,  patient reports he woke up and was having Bilateral knee pain and actually had to use crutches for ambulation. Pt took a Norco 5 mg and an Aleve but the pain did not resolve. Patient reports no known trauma or injury to knees. He also reports having a rash in his left groin area for the past 2 to 3 days and states \"it feels like I have been scraped with a razor blade in my groin. \"  Patient denies any chest pain, dizziness or dyspnea. Patient's surgical history includes  Laparoscopic Bilateral inguinal hernia repair with mesh and umbilical hernia repair with mesh done on 9/15/2022 per Dr. Seema Solis and was discharged home same day of surgery. Patient was readmitted on 9/18/2022 due to Abdominal pain/nausea and vomiting and CT scan of Abdomen/Pelvis showed Small Bowel Obstruction. Initially on that admission, patient's SBO was managed with NG tube but on 9/19/2022, patient underwent EXPLORATORY LAPAROTOMY , REVISION OF MESH and LYSIS OF ADHESIONS. Patient progressed well and was discharged home on 9/23/2022. ER workup done 9/28/2022 includes CT scan of Abdomen/Pelvis which shows:  EXAMINATION: CT  ABDOMEN / PELVIS   9/28/2022 11:49 AM       ACCESSION NUMBER:  [id number]       INDICATION:  Abdominal pain with distention. Erythema and firmness at recent umbilical hernia repair site. Recent small bowel obstruction. COMPARISON: CT abdomen and pelvis, 9/18/2022       TECHNIQUE: Contiguous axial computed tomographic images were obtained from the domes of the diaphragm to the symphysis pubis after the intravenous administration of 100 mL of Isovue 370. Coronal and sagittal reconstructions were also performed. Radiation dose reduction techniques were used for this study:  Our CT scanners use one or all of the following: Automated exposure control, adjustment of the mA and/or kVp according to patient's size, iterative reconstruction. FINDINGS:       LOWER THORAX: Minimal bibasilar subsegmental atelectasis. Postsurgical changes of CABG with atherosclerotic calcifications in the native coronary arteries. LIVER: Simple cyst in segment 2 measuring 3.9 cm. There are a few additional smaller hypodensities which are difficult to characterize but stable, likely cysts or hemangiomas. BILIARY: Gallbladder is unremarkable. No intrahepatic or extrahepatic biliary   ductal dilation. SPLEEN: No splenomegaly or concerning lesion. PANCREAS: No pancreatic duct dilation or mass. ADRENALS: No adrenal nodules or hypertrophy. URINARY: Kidneys are normal in size and enhancement. No renal mass. No   hydronephrosis. No renal or ureteral calculi. Urinary bladder is unremarkable. BOWEL: The stomach is normal in caliber and wall thickness. There are several segments of borderline dilated small bowel centrally within the abdomen. A  few segments demonstrate mild wall thickening with mucosal hyperenhancement.         The more distal small bowel is decompressed with the transition point just to the right of midline in the body bilaterally anterior abdomen where there is a   segment of bowel wall thickening. No pneumatosis or free air. Colonic diverticulosis without evidence of diverticulitis. The appendix is normal.                   VASCULAR: The abdominal aorta and iliac arterial system are nonaneurysmal with   advanced atherosclerotic calcifications. There is likely high-grade stenosis   involving the proximal SMA due to bulky calcific plaque. NODES: No lymphadenopathy. FLUID: No free intraperitoneal fluid. REPRODUCTIVE: Unremarkable       BONES/SOFT TISSUES: There are postsurgical changes of recent umbilical hernia   repair. There is a new rim-enhancing gas and fluid containing collection at the   hernia repair site measuring 3.6 x 2.9 x 6.7 cm. There is also new fluid deep to   the collection with crescentic morphology, abutting the anterior peritoneal   reflection measuring 5.8 x 2.2 x 2.8 cm, likely in communication with one   another. Subcutis edema along the anterior abdominal wall. No acute or   aggressive osseous abnormality. Impression       1. Recent umbilical hernia repair with a new rim-enhancing gas and fluid   containing collection at the hernia repair site concerning for abscess. This   appears to communicate with an additional area of deeper extraperitoneal fluid   along the anterior peritoneal reflection. 2. Several of the adjacent small bowel segments in the anterior abdomen   demonstrate inflammatory changes and mild dilation with abrupt transition to   decompressed small bowel concerning for developing bowel obstruction. 3. 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 Subjective:     Patient resting in bed. Pt remains on IV Zosyn due to CT scan this admission   concerning for developing SBO and findings concerning for abscess.   Pt denies any nausea or vomiting this morning. Pt reports + Flatus but no BM since 1 day ago. PT remains afebrile. WBC 10.7 today which is down from 12. 0 on admission. Cr 1.39 today which is slight increase from admission Cr 1.0 and on prior admission Cr 1.29. Patient tolerated a full liquid diet . Patient is on Reglan IV every 6 hrs. Patient reports his BL knee pain is \"some better\" but persists with ambulating. BL LE Duplex US done 9/28/2022 which shows no DVT in either leg but suggests   Cystic structures are seen in the bilateral popliteal fossae measuring 5.1 cm x 1.6 cm x 3.6 cm on the right, and 3.3 cm x 0.8 cm x 2 cm on the left. 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      Objective:     /63   Pulse 80   Temp 97.9 °F (36.6 °C) (Oral)   Resp 17   Ht 5' 10\" (1.778 m)   Wt 232 lb (105.2 kg)   SpO2 96%   BMI 33.29 kg/m²     No intake or output data in the 24 hours ending 09/29/22 0844     Physical Exam:  Vitals and nursing note reviewed  Constitutional:       General: Awake, alert and resting on stretcher . HENT:      Head: Normocephalic and atraumatic. Right Ear: External ear normal.      Left Ear: External ear normal.      Nose: No congestion or rhinorrhea     Mouth: Mucous membranes are moist.   Eyes:      Extraocular Movements: Extraocular movements intact. Pupils: Pupils are equal, round, and reactive to light. Cardiovascular:      Rate and Rhythm: Normal rate and regular rhythm. Heart sounds: Normal heart sounds. Pulmonary:      Effort: Pulmonary effort is normal.      Breath sounds: CTA BL with no wheezing, rales or rhonchi. Abdominal:      General: There is Mild distention noted with + bowel sounds x 4 quads. Palpations: Abdomen is obese. No HSM noted. Mild tenderness noted to umbilical surgical site which is approximated with steri-strips and old dried blood noted to site.   There is surrounding erythema, warmth and induration noted to periumbilical surgical site which extends in circular area of umbilicus. Erythema has increased to umbilical surgical site. Musculoskeletal:         General: No swelling or tenderness. Normal range of motion to all joints but patient reports pain with ambulation to BL knees. Negative boni's sign. No Erythema to Knees. Cap refill < 2 seconds. NVI distally. 2 + pulses BL LE's  Skin:     Left inguinal fold area with moist, exudative rash. Mild scrotal edema present. Neurological:      General: No focal deficit present. Mental Status: He is alert and oriented to person, place, and time. Psychiatric:         Mood and Affect: Mood normal.     Data Review        Latest Reference Range & Units 9/29/22 04:23   WBC 4.3 - 11.1 K/uL 10.7   RBC 4.23 - 5.6 M/uL 4.04 (L)   Hemoglobin Quant 13.6 - 17.2 g/dL 11.4 (L)   Hematocrit 41.1 - 50.3 % 35.4 (L)   MCV 79.6 - 97.8 FL 87.6   MCH 26.1 - 32.9 PG 28.2   MCHC 31.4 - 35.0 g/dL 32.2   MPV 9.4 - 12.3 FL 8.9 (L)   RDW 11.9 - 14.6 % 12.6   Platelet Count 051 - 450 K/uL 405      Latest Reference Range & Units 9/29/22 04:23   Sodium 136 - 145 mmol/L 139   Potassium 3.5 - 5.1 mmol/L 3.6   Chloride 101 - 110 mmol/L 105   CO2 21 - 32 mmol/L 27   BUN,BUNPL 8 - 23 MG/DL 11   Creatinine 0.8 - 1.5 MG/DL 1.39   Anion Gap 4 - 13 mmol/L 7   GFR Non-African American >60 ml/min/1.73m2 53 (L)   GFR African American >60 ml/min/1.73m2 >60   Glucose, Random 65 - 100 mg/dL 119 (H)   CALCIUM, SERUM, 154110 8.3 - 10.4 MG/DL 8.4       ASSESSMENT:  Principal Problem:    Postoperative wound infection  Resolved Problems:    * No resolved hospital problems.  *      PLAN:  -Further Input from Attending Jeffrey Lopez to ascertain  need to possibly I/D Umbilical surgical site if site does not respond to IV antibx  -Continue Full liquid diet for now  -Continue IV Zosyn  -Admitting team for meds/labs/etc  -Continue Reglan as scheduled  -Patient can follow up with his PCP and/or Ortho MD as outpatient  -Physical therapy rocío Beasley FNP-BC

## 2022-09-29 NOTE — PROGRESS NOTES
Hospitalist Progress Note   Admit Date:  2022  9:56 AM   Name:  Brent Huang. Age:  78 y.o. Sex:  male  :  1945   MRN:  [id number]   Room:  [facility]/    Presenting Complaint: Post-op Problem and Knee Pain     Reason(s) for Admission: Postoperative wound infection [T81.49XA]  Postoperative infection, unspecified type, initial encounter [address] Course: Brent Irving is a 78 y.o. male with medical history of PAF, HTN, CAD, Depression who presented with c/o erythema, drainage from umbilical surgical incision. Pt had recent admission for laparoscopic bilateral inguinal hernia repair with mesh and umbilical hernia repair with mesh 9/15 per Dr. Florence Levy.  pt was readmitted for n/v, SBO requiring ex lap, revision of mesh and lysis of adhesions, DC . CT scan this admission concerning for developing SBO and findings concerning for abscess. Given Vanc and Zosyn in ED. Gen Surg at bedside in ED. Subjective & 24hr Events (22): Left knee pain improved today. Right knee pain still significant. Working with PT, walking with walker. Large BM this morning. Denies CP, SOB, n/v/d, abd pain. Assessment & Plan:     Principal Problem:    Postoperative wound infection  General Surgery consulted  On Zosyn per Gen Surg recs  Full liquid diet per Gen Surg recs  IVF  Scheduled reglan per Gen Surg recs   had BM today     Rash groin  Antifungal cream     Bilateral knee pain   neg bilateral LE US   Check uric acid  Xray left knee     PAF  Home meds  Not on [address]     HTN  Home meds  metoprolol/losartan        CAD  Home meds  ASA/Plavix/BB  Praluent     Depression  Home meds paxil                Anticipated discharge needs:     Pending clinical course     Diet: full liquid  VTE ppx: lovenox  Code status: Full    Hospital Problems:  Principal Problem:    Postoperative wound infection  Resolved Problems:    * No resolved hospital problems.  *      Objective: Patient Vitals for the past 24 hrs:   Temp Pulse Resp BP SpO2   09/29/22 0809 97.9 °F (36.6 °C) 80 17 121/63 96 %   09/29/22 0254 98.2 °F (36.8 °C) 78 18 101/60 94 %   09/29/22 0009 -- 92 -- (!) 104/56 --   09/28/22 2358 -- 96 -- 117/63 --   09/28/22 2350 -- 90 -- (!) 80/56 --   09/28/22 2300 98.1 °F (36.7 °C) 94 16 (!) 96/56 90 %   09/28/22 1924 -- -- 16 -- --   09/28/22 1918 98.8 °F (37.1 °C) 86 18 117/68 96 %   09/28/22 1642 97.5 °F (36.4 °C) 75 17 137/70 100 %   09/28/22 1526 -- 71 -- 137/71 98 %   09/28/22 1432 -- 69 -- 126/61 98 %   09/28/22 1339 -- 66 16 132/65 98 %   09/28/22 1237 98.4 °F (36.9 °C) 84 16 134/70 99 %       Oxygen Therapy  SpO2: 96 %  O2 Device: None (Room air)    Estimated body mass index is 33.29 kg/m² as calculated from the following:    Height as of this encounter: 5' 10\" (1.778 m). Weight as of this encounter: 232 lb (105.2 kg). Intake/Output Summary (Last 24 hours) at 9/29/2022 1135  Last data filed at 9/29/2022 0809  Gross per 24 hour   Intake 200 ml   Output --   Net 200 ml         Physical Exam:     Blood pressure 121/63, pulse 80, temperature 97.9 °F (36.6 °C), temperature source Oral, resp. rate 17, height 5' 10\" (1.778 m), weight 232 lb (105.2 kg), SpO2 96 %. General:          Well nourished. Head:               Normocephalic, atraumatic  Eyes:               Sclerae appear normal.  Pupils equally round. ENT:                Nares appear normal, no drainage. Moist oral mucosa  Neck:               No restricted ROM. Trachea midline   CV:                  RRR. No m/r/g. No jugular venous distension. Lungs:             CTAB. No wheezing, rhonchi, or rales. Symmetric expansion. Abdomen: Bowel sounds present. Soft, tender, distended. Periumbilical erythema with induration. Extremities:     No cyanosis or clubbing. No edema. Neg boni's sign. Normal ROM bilateral LE. 2+ pedal pulses bilaterally. Skin:                No rashes and normal coloration. Warm and dry. Neuro:             CN II-XII grossly intact. Sensation intact. A&Ox3  Psych:             Normal mood and affect.       I have personally reviewed labs and tests showing:  Recent Labs:  Recent Results (from the past 48 hour(s))   CBC with Auto Differential    Collection Time: 09/28/22 10:42 AM   Result Value Ref Range    WBC 12.0 (H) 4.3 - 11.1 K/uL    RBC 4.32 4.23 - 5.6 M/uL    Hemoglobin 12.2 (L) 13.6 - 17.2 g/dL    Hematocrit 37.1 (L) 41.1 - 50.3 %    MCV 85.9 79.6 - 97.8 FL    MCH 28.2 26.1 - 32.9 PG    MCHC 32.9 31.4 - 35.0 g/dL    RDW 12.6 11.9 - 14.6 %    Platelets 978 197 - 726 K/uL    MPV 8.6 (L) 9.4 - 12.3 FL    nRBC 0.00 0.0 - 0.2 K/uL    Differential Type AUTOMATED      Seg Neutrophils 72 43 - 78 %    Lymphocytes 13 13 - 44 %    Monocytes 13 (H) 4.0 - 12.0 %    Eosinophils % 2 0.5 - 7.8 %    Basophils 0 0.0 - 2.0 %    Immature Granulocytes 1 0.0 - 5.0 %    Segs Absolute 8.6 (H) 1.7 - 8.2 K/UL    Absolute Lymph # 1.5 0.5 - 4.6 K/UL    Absolute Mono # 1.5 (H) 0.1 - 1.3 K/UL    Absolute Eos # 0.2 0.0 - 0.8 K/UL    Basophils Absolute 0.1 0.0 - 0.2 K/UL    Absolute Immature Granulocyte 0.1 0.0 - 0.5 K/UL   CMP    Collection Time: 09/28/22 10:42 AM   Result Value Ref Range    Sodium 138 136 - 145 mmol/L    Potassium 3.5 3.5 - 5.1 mmol/L    Chloride 103 101 - 110 mmol/L    CO2 28 21 - 32 mmol/L    Anion Gap 7 4 - 13 mmol/L    Glucose 125 (H) 65 - 100 mg/dL    BUN 9 8 - 23 MG/DL    Creatinine 1.00 0.8 - 1.5 MG/DL    GFR African American >60 >60 ml/min/1.73m2    GFR Non- >60 >60 ml/min/1.73m2    Calcium 9.1 8.3 - 10.4 MG/DL    Total Bilirubin 0.5 0.2 - 1.1 MG/DL    ALT 17 12 - 65 U/L    AST 13 (L) 15 - 37 U/L    Alk Phosphatase 54 50 - 136 U/L    Total Protein 6.6 6.3 - 8.2 g/dL    Albumin 2.9 (L) 3.2 - 4.6 g/dL    Globulin 3.7 (H) 2.3 - 3.5 g/dL    Albumin/Globulin Ratio 0.8 (L) 1.2 - 3.5     Lipase    Collection Time: 09/28/22 10:42 AM   Result Value Ref Range    Lipase 174 73 - 393 U/L   Culture, Blood 1    Collection Time: 09/28/22 10:42 AM    Specimen: Blood   Result Value Ref Range    Special Requests LEFT  Antecubital        Culture NO GROWTH AFTER 20 HOURS     Lactic Acid    Collection Time: 09/28/22 10:42 AM   Result Value Ref Range    Lactic Acid, Plasma 0.9 0.4 - 2.0 MMOL/L   Urinalysis w rflx microscopic    Collection Time: 09/28/22  1:39 PM   Result Value Ref Range    Color, UA YELLOW/STRAW      Appearance CLEAR      Specific Gravity, UA 1.021 1.001 - 1.023      pH, Urine 6.5 5.0 - 9.0      Protein, UA Negative NEG mg/dL    Glucose, UA Negative mg/dL    Ketones, Urine Negative NEG mg/dL    Bilirubin Urine Negative NEG      Blood, Urine Negative NEG      Urobilinogen, Urine 0.2 0.2 - 1.0 EU/dL    Nitrite, Urine Negative NEG      Leukocyte Esterase, Urine Negative NEG     Culture, Blood 1    Collection Time: 09/28/22  5:20 PM    Specimen: Blood   Result Value Ref Range    Special Requests RIGHT  HAND        Culture NO GROWTH AFTER 13 HOURS     CBC with Auto Differential    Collection Time: 09/29/22  4:23 AM   Result Value Ref Range    WBC 10.7 4.3 - 11.1 K/uL    RBC 4.04 (L) 4.23 - 5.6 M/uL    Hemoglobin 11.4 (L) 13.6 - 17.2 g/dL    Hematocrit 35.4 (L) 41.1 - 50.3 %    MCV 87.6 79.6 - 97.8 FL    MCH 28.2 26.1 - 32.9 PG    MCHC 32.2 31.4 - 35.0 g/dL    RDW 12.6 11.9 - 14.6 %    Platelets 075 753 - 954 K/uL    MPV 8.9 (L) 9.4 - 12.3 FL    nRBC 0.00 0.0 - 0.2 K/uL    Differential Type AUTOMATED      Seg Neutrophils 79 (H) 43 - 78 %    Lymphocytes 10 (L) 13 - 44 %    Monocytes 6 4.0 - 12.0 %    Eosinophils % 3 0.5 - 7.8 %    Basophils 0 0.0 - 2.0 %    Immature Granulocytes 1 0.0 - 5.0 %    Segs Absolute 8.5 (H) 1.7 - 8.2 K/UL    Absolute Lymph # 1.1 0.5 - 4.6 K/UL    Absolute Mono # 0.7 0.1 - 1.3 K/UL    Absolute Eos # 0.3 0.0 - 0.8 K/UL    Basophils Absolute 0.0 0.0 - 0.2 K/UL    Absolute Immature Granulocyte 0.1 0.0 - 0.5 K/UL   Basic Metabolic Panel w/ Reflex to MG    Collection Time: 09/29/22  4:23 AM   Result Value Ref Range    Sodium 139 136 - 145 mmol/L    Potassium 3.6 3.5 - 5.1 mmol/L    Chloride 105 101 - 110 mmol/L    CO2 27 21 - 32 mmol/L    Anion Gap 7 4 - 13 mmol/L    Glucose 119 (H) 65 - 100 mg/dL    BUN 11 8 - 23 MG/DL    Creatinine 1.39 0.8 - 1.5 MG/DL    GFR African American >60 >60 ml/min/1.73m2    GFR Non- 53 (L) >60 ml/min/1.73m2    Calcium 8.4 8.3 - 10.4 MG/DL       I have personally reviewed imaging studies showing: Other Studies:  XR CHEST (2 VW)   Final Result      1. No evidence of pneumonia or pulmonary edema. Vascular duplex lower extremity venous bilateral   Final Result   1. No evidence for lower extremity DVT in either leg. CT ABDOMEN PELVIS W IV CONTRAST Additional Contrast? None   Final Result      1. Recent umbilical hernia repair with a new rim-enhancing gas and fluid   containing collection at the hernia repair site concerning for abscess. This   appears to communicate with an additional area of deeper extraperitoneal fluid   along the anterior peritoneal reflection. 2. Several of the adjacent small bowel segments in the anterior abdomen   demonstrate inflammatory changes and mild dilation with abrupt transition to   decompressed small bowel concerning for developing bowel obstruction. 3. 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             XR KNEE LEFT (MIN 4 VIEWS)    (Results Pending)       Current Meds:  Current Facility-Administered Medications   Medication Dose Route Frequency    [START ON 9/30/2022] alirocumab (PRALUENT) 75 MG/ML injection pen 75 mg  75 mg SubCUTAneous Q14 Days    aspirin EC tablet 81 mg  81 mg Oral Daily    docusate sodium (COLACE) capsule 100 mg  100 mg Oral BID PRN    montelukast (SINGULAIR) tablet 10 mg  10 mg Oral Daily    PARoxetine (PAXIL) tablet 30 mg  30 mg Oral QPM    thiamine tablet 100 mg  100 mg Oral Daily    sodium chloride flush 0.9 % injection 5-40 mL  5-40 mL IntraVENous 2 times per day    sodium chloride flush 0.9 % injection 5-40 mL  5-40 mL IntraVENous PRN    0.9 % sodium chloride infusion   IntraVENous PRN    enoxaparin Sodium (LOVENOX) injection 30 mg  30 mg SubCUTAneous BID    ondansetron (ZOFRAN-ODT) disintegrating tablet 4 mg  4 mg Oral Q8H PRN    Or    ondansetron (ZOFRAN) injection 4 mg  4 mg IntraVENous Q6H PRN    acetaminophen (TYLENOL) tablet 650 mg  650 mg Oral Q6H PRN    Or    acetaminophen (TYLENOL) suppository 650 mg  650 mg Rectal Q6H PRN    0.9 % sodium chloride infusion   IntraVENous Continuous    tuberculin injection 5 Units  5 Units IntraDERmal Once    metoclopramide (REGLAN) injection 5 mg  5 mg IntraVENous Q6H    piperacillin-tazobactam (ZOSYN) 3,375 mg in sodium chloride 0.9 % 50 mL IVPB (mini-bag)  3,375 mg IntraVENous Q8H    HYDROcodone-acetaminophen (NORCO) 5-325 MG per tablet 1 tablet  1 tablet Oral Q6H PRN    terbinafine (LAMISIL) 1 % cream   Topical BID     Facility-Administered Medications Ordered in Other Encounters   Medication Dose Route Frequency    0.9 % sodium chloride bolus  100 mL IntraVENous ONCE PRN       Signed:  JEANA Jackson - CNP    Notes, labs, VS, diagnostic testing reviewed  Case discussed with pt, care team, Dr. Abdullahi Julian, wife at bedside

## 2022-09-29 NOTE — DISCHARGE INSTR - DIET
Good nutrition is important when healing from an illness, injury, or surgery. Follow any nutrition recommendations given to you during your hospital stay. If you were given an oral nutrition supplement while in the hospital, continue to take this supplement at home. You can take it with meals, in-between meals, and/or before bedtime. These supplements can be purchased at most local grocery stores, pharmacies, and chain [employer]-stores. If you have any questions about your diet or nutrition, call the hospital and ask for the dietitian. Nutrition Supplements  If you are not able to eat enough food or if you have extra calorie requirements, oral supplements can provide you with additional calories, protein, vitamins and minerals. Recommend Ensure Complete or Ensure Plus or a comparable/similar product Twice daily for 30 days unless otherwise directed by your Primary Care Physician.   Elkin Graham RD,LD, [facility]

## 2022-09-29 NOTE — PROGRESS NOTES
ACUTE PHYSICAL THERAPY GOALS:   (Developed with and agreed upon by patient and/or caregiver.)  STG:  (1.)Mr. Shahab Burgos will move from supine to sit and sit to supine  with SUPERVISION within 7 treatment day(s). (2.)Mr. Shahab Burgos will transfer from bed to chair and chair to bed with SUPERVISION using the least restrictive device within 7 treatment day(s). (3.)Mr. Shahab Burgos will ambulate with SUPERVISION for 650 feet with the least restrictive device within 7 treatment day(s). (4)Mr. Shahab Burgos will go up a 12 steps with rail CGA in 7 days. (5)Mr. Shahab Burgos will perform HEP with cues and assistance in 7 days. ________________________________________________________________________________________________      PHYSICAL THERAPY Initial Assessment and AM  (Link to Caseload Tracking: PT Visit Days : 1  Acknowledge Orders  Time In/Out  PT Charge Capture  Rehab Caseload Tracker    Eden Alvarenga is a 78 y.o. male   PRIMARY DIAGNOSIS: Postoperative wound infection  Postoperative wound infection [T81.49XA]  Postoperative infection, unspecified type, initial encounter Lenore Vilchis       Reason for Referral: Generalized Muscle Weakness (M62.81)  Other abnormalities of gait and mobility (R26.89)  Inpatient: Payor: MEDICARE / Plan: MEDICARE PART A AND B / Product Type: *No Product type* /     ASSESSMENT:     REHAB RECOMMENDATIONS:   Recommendation to date pending progress:  Setting:  HHPT vs. none    Equipment:    To Be Determined     ASSESSMENT:  Mr. Shahab Burgos presents with general weakness and left knee pain and decreased functional mobility. Pt. Admitted with above diagnosis. He had a hernia repair on 9/15/22 and then a revision on 9/19/22. He has had some recent knee pain as well requiring him to use crutches. He reports he was mobile and active prior to surgery, more sedentary since surgery. This am, he reports feeling better. Wife present. He reports pain in left knee only. Left knee rom is limited.   He was able to transfer and ambulate CGA/SBA using a RW due to his left knee with some limp. Will follow.        [address] [facility] [address] [id number] AM-PAC 6 Clicks Basic Mobility Inpatient Short Form  AM-PAC Mobility Inpatient   How much difficulty turning over in bed?: A Little  How much difficulty sitting down on / standing up from a chair with arms?: A Little  How much difficulty moving from lying on back to sitting on side of bed?: A Little  How much help from another person moving to and from a bed to a chair?: None  How much help from another person needed to walk in hospital room?: None  How much help from another person for climbing 3-5 steps with a railing?: A Little  AM-[facility] Inpatient Mobility Raw Score : 20  AM-PAC Inpatient T-Scale Score : 47.67  Mobility Inpatient CMS 0-100% Score: 35.83  Mobility Inpatient CMS G-Code Modifier : CJ    SUBJECTIVE:   Mr. Meena Vvieros states, \"better\"     Social/Functional Lives With: Spouse  Type of Home: House  ADL Assistance: Independent  Homemaking Assistance: Independent  Ambulation Assistance: Independent  Transfer Assistance: Independent  Occupation: Retired    OBJECTIVE:     PAIN: Lindaann Left / O2: PRECAUTION / Elvira Birminghame / Carras[employer] Butter:   Pre Treatment:          Post Treatment: left knee pain Vitals        Oxygen      None    RESTRICTIONS/PRECAUTIONS:                    GROSS EVALUATION: Intact Impaired (Comments):   AROM [x]  LE's except left knee grossly -10 to 70 degrees   PROM []    Strength [x]  Decreased functional   Balance [] Sitting - Static: Good  Sitting - Dynamic: Good  Standing - Static: Good  Standing - Dynamic: Fair   Posture [] Rounded Shoulders   Sensation [x]     Coordination []      Tone []     Edema []    Activity Tolerance [] Patient Tolerated treatment well, Patient limited by fatigue, Patient limited by pain    []      COGNITION/  PERCEPTION: Intact Impaired (Comments):   Orientation [x]     Vision [x]     Hearing [x]     Cognition  [x]       MOBILITY: I Mod I S SBA CGA Min Mod Max Total  NT x2 Comments:   Bed Mobility    Rolling [] [] [] [] [] [] [] [] [] [] []    Supine to Sit [] [] [] [x] [] [] [] [] [] [] []    Scooting [] [] [] [x] [] [] [] [] [] [] []    Sit to Supine [] [] [] [] [] [] [] [] [] [] []    Transfers    Sit to Stand [] [] [] [x] [x] [] [] [] [] [] []    Bed to Chair [] [] [] [x] [x] [] [] [] [] [] []    Stand to Sit [] [] [] [x] [x] [] [] [] [] [] []     [] [] [] [] [] [] [] [] [] [] []    I=Independent, Mod I=Modified Independent, S=Supervision, SBA=Standby Assistance, CGA=Contact Guard Assistance,   Min=Minimal Assistance, Mod=Moderate Assistance, Max=Maximal Assistance, Total=Total Assistance, NT=Not Tested    GAIT: I Mod I S SBA CGA Min Mod Max Total  NT x2 Comments:   Level of Assistance [] [] [] [x] [] [] [] [] [] [] []    Distance 125 feet    DME Rolling Walker    Gait Quality Antalgic, Decreased anshul , and Decreased stance    Weightbearing Status      Stairs      I=Independent, Mod I=Modified Independent, S=Supervision, SBA=Standby Assistance, CGA=Contact Guard Assistance,   Min=Minimal Assistance, Mod=Moderate Assistance, Max=Maximal Assistance, Total=Total Assistance, NT=Not Tested    PLAN:   FREQUENCY AND DURATION: Daily for duration of hospital stay or until stated goals are met, whichever comes first.    THERAPY PROGNOSIS: Good    PROBLEM LIST:   (Skilled intervention is medically necessary to address:)  Decreased ADL/Functional Activities  Decreased Activity Tolerance  Decreased AROM/PROM  Decreased Balance  Decreased Coordination  Decreased Gait Ability  Decreased Safety Awareness  Decreased Strength  Decreased Transfer Abilities  Increased Pain INTERVENTIONS PLANNED:   (Benefits and precautions of physical therapy have been discussed with the patient.)  Therapeutic Activity  Therapeutic Exercise/HEP  Gait Training  Modalities  Education       TREATMENT:   EVALUATION: LOW COMPLEXITY: (Untimed Charge)    TREATMENT:   Therapeutic Activity (15 Minutes):  Therapeutic activity included Supine to Sit, Scooting, Ambulation on level ground, Sitting balance , and Standing balance to improve functional Activity tolerance, Balance, Coordination, Mobility, Strength, and ROM. TREATMENT GRID:  N/A    AFTER TREATMENT PRECAUTIONS: Bed/Chair Locked, Call light within reach, Chair, Needs within reach, and Visitors at bedside    INTERDISCIPLINARY COLLABORATION:  RN/ PCT and OT/ FOX    EDUCATION: Education Given To: Patient; Family  Education Provided: Role of Therapy;Transfer Training  Education Method: Demonstration;Verbal  Education Outcome: Verbalized understanding    TIME IN/OUT:  Time In: 1120  Time Out: [address] [facility]  Minutes: ANABEL Barbosa

## 2022-09-29 NOTE — PROGRESS NOTES
Comprehensive Nutrition Assessment    Type and Reason for Visit: Initial, Positive Nutrition Screen  Malnutrition Screening Tool: Malnutrition Screen  Have you recently lost weight without trying?: 14 to 23 pounds (2 points)  Have you been eating poorly because of a decreased appetite?: No (0 points)  Malnutrition Screening Tool Score: 2    Nutrition Recommendations/Plan:   Food and/or Nutrient Delivery: Continue Current Diet, Start Oral Nutrition Supplement  Medical food supplement therapy:  Initiate Ensure Enlive three times per day (this provides 350 kcal and 20 grams protein per bottle)   Nutrition Education/Counseling: Education initiated. Reviewed with pt that since FLD in the hospital is lowe kcal and protein that use of high kcal, high protein supplement would not exceed kcal needs while on FLD and would help to  ore closely meet protein needs s well as supply additional vitamins and minerals. Coordination of Nutrition Care: Continue to monitor while inpatient, Interdisciplinary Rounds   Order placed for daily weight. Malnutrition Assessment:  Malnutrition Status: At risk for malnutrition (Comment) (Reported preumed weight loss, decreased po intake for 2 weeks d/t FLD post op)    Nutrition Assessment:  Nutrition History: 9/29: Pt reports they had had him on a liquid diet for the past 2-3 weeks post-op hernia surgery. \"Anyone would lose weight on that\". Report pre-op weight of 232# and no weight check since then. He does not want to gain the weight lost and would like to lose more weight. Unable to provide any quantifiable diet history. Nutrition Background:   H/O: PAF, HTN, CAD, depression. Pt had recent admission for laparoscopic bilateral inguinal hernia repair with mesh and umbilical hernia repair with mesh 9/15. 9/18 pt was readmitted for n/v, SBO requiring ex lap, revision of mesh and lysis of adhesions, DC 9/23. P/W c/o erythema, drainage from umbilical surgical incision.    CT concerning for developing SBO and findings concerning for abscess. Nutrition Interval:  Pt seen sitting on side of bed with lunch tray at bedside. Pt consumed 100% of FLD of broth, jello and tea. He asked for some ice cream and is just now starting to eat it. He is receptive to ONS but expresses concern about weight gain. Current Nutrition Therapies:  ADULT DIET; Full Liquid    Current Intake:   Average Meal Intake: %        Anthropometric Measures:  Height: 5' 10\" (177.8 cm)  Current Body Wt: 232 lb (105.2 kg) (9/28), Weight source: Stated  BMI: 33.3  Admission Body Weight: 232 lb (105.2 kg) (stated)  Ideal Body Weight (Kg) (Calculated): 75 kg (166 lbs),    Usual Body Wt:  ,   9/28/2022 232 lbs 105.2 kg Stated   9/18/2022 232 lbs 105.2 kg Stated   9/15/2022 232 lbs 8 oz 105.5 kg Standing scale   9/13/2022 230 lbs 104.3 kg Stated   8/11/2022 231 lbs 104.8 kg -   5/12/2022 229 lbs 103.9 kg -   5/10/2022 226 lbs 102.5 kg -   5/2/2022 231 lbs 104.8 kg -   11/12/2021 229 lbs 13 oz 104.2 kg -   11/2/2021 227 lbs 103 kg -   9/23/2021 227 lbs 10 oz 103.2 kg    Percent weight change:  none per EMR but current weight is stated       Edema: No data recorded  BMI Category Obese Class 1 (BMI 30.0-34. 9)  Estimated Daily Nutrient Needs:  Energy (kcal/day): 1647-8192 (15-18 kcal/kg) (Kcal/kg Weight used: 105.5 kg Other (Comment) (pre-op standing scale 9/15)  Protein (g/day):  (25% of kcal) Weight Used: (Other (Comment) (25% of kcal))    Fluid (ml/day):   (1 ml/kcal)    Nutrition Diagnosis:   Inadequate oral intake related to altered GI function (diet limited to liquids) as evidenced by  (pt reproted barriers as above, FLD diet is nutriionally inadequate without ONS)    Goals:       Active Goal: Meet at least 75% of estimated needs, by next RD assessment       Nutrition Monitoring and Evaluation:      Food/Nutrient Intake Outcomes: Diet Advancement/Tolerance, Food and Nutrient Intake, Supplement Intake Discharge Planning:    Continue Oral Nutrition [address], [address],[place], [address] [facility] [place]

## 2022-09-29 NOTE — PROGRESS NOTES
ACUTE OCCUPATIONAL THERAPY GOALS:   (Developed with and agreed upon by patient and/or caregiver.)  Pt will be SBA to supervision with ADL's and ambulated short distances. OCCUPATIONAL THERAPY Initial Assessment, Daily Note, Discharge, and AM       OT Visit Days: 1  Acknowledge Orders  Time  OT Charge Capture  Rehab Caseload Tracker      Edmar Trinidad is a 78 y.o. male   PRIMARY DIAGNOSIS: Postoperative wound infection  Postoperative wound infection [T81.49XA]  Postoperative infection, unspecified type, initial encounter [T81.40XA]       Reason for Referral: Pain in Left Knee (M25.562)  Stiffness of Left Knee, Not elsewhere classified (M25.662)  Generalized Muscle Weakness (M62.81)  Inpatient: Payor: MEDICARE / Plan: MEDICARE PART A AND B / Product Type: *No Product type* /     ASSESSMENT:     REHAB RECOMMENDATIONS:   Recommendation to date pending progress:  Setting:  No further skilled therapy after discharge from hospital    Equipment:    [address]:  Mr. Eliezer Bell was admitted with above diagnosis after coming to the ED with severe knee pain. Pt was seen in room with wife present. Pt is noted to move slow and did benefit from a RW but was stand by assistance with all self care. Pt's wife is supportive and able to assist as needed. No further OT warranted at this time. Pt up in room and kennedy, household distance, with RW and SBA. Pt setup in chair in room and left with all needs.       [address] [facility] [address] [address] AM-PAC 6 Clicks Daily Activity Inpatient Short Form:    AM-PAC Daily Activity Inpatient   How much help for putting on and taking off regular lower body clothing?: A Little  How much help for Bathing?: None  How much help for Toileting?: None  How much help for putting on and taking off regular upper body clothing?: None  How much help for taking care of personal grooming?: None  How much help for eating meals?: None  AM-PAC Inpatient Daily Activity Raw Score: 23  AM-PAC Inpatient ADL T-Scale Score : 51.12  ADL Inpatient CMS 0-100% Score: 15.86  ADL Inpatient CMS G-Code Modifier : CI           SUBJECTIVE:     Mr. Nancie Hanley states, \"my left knee hurts\"     Social/Functional Lives With: Spouse  Type of Home: House  ADL Assistance: Independent  Homemaking Assistance: Independent  Ambulation Assistance: Independent  Transfer Assistance: Independent  Occupation: Retired    OBJECTIVE:     Bee Dunbar / Magnus Castorena / Mila Quarry: IV    RESTRICTIONS/PRECAUTIONS:       PAIN: Vini Monty / O2:   Pre Treatment:          Post Treatment: left knee pain       Vitals          Oxygen            GROSS EVALUATION: INTACT IMPAIRED   (See Comments)   UE AROM [x] []   UE PROM [x] []   Strength []  Very mild generalized weakness due to recent surgery      Posture / Balance [x]     Sensation [x]     Coordination [x]       Tone [x]       Edema []    Activity Tolerance []  Fair to good      Hand Dominance R [] L []      COGNITION/  PERCEPTION: INTACT IMPAIRED   (See Comments)   Orientation [x]     Vision [x]     Hearing [x]     Cognition  [x]     Perception [x]       MOBILITY: I Mod I S SBA CGA Min Mod Max Total  NT x2 Comments:   Bed Mobility    Rolling [] [] [] [] [] [] [] [] [] [] []    Supine to Sit [] [] [] [x] [] [] [] [] [] [] []    Scooting [] [] [] [x] [] [] [] [] [] [] []    Sit to Supine [] [] [] [] [] [] [] [] [] [] []    Transfers    Sit to Stand [] [] [] [x] [] [] [] [] [] [] []    Bed to Chair [] [] [] [x] [] [] [] [] [] [] []    Stand to Sit [] [] [] [x] [] [] [] [] [] [] []    Tub/Shower [] [] [] [x] [] [] [] [] [] [] []     Toilet [] [] [] [x] [] [] [] [] [] [] []      [] [] [] [] [] [] [] [] [] [] []    I=Independent, Mod I=Modified Independent, S=Supervision/Setup, SBA=Standby Assistance, CGA=Contact Guard Assistance, Min=Minimal Assistance, Mod=Moderate Assistance, Max=Maximal Assistance, Total=Total Assistance, NT=Not Tested    ACTIVITIES OF DAILY LIVING: I Mod I S SBA CGA Min Mod Max Total NT Comments   BASIC ADLs: Upper Body Bathing  [] [] [] [x] [] [] [] [] [] []    Lower Body Bathing [] [] [] [x] [] [] [] [] [] []    Toileting [] [] [] [x] [] [] [] [] [] []    Upper Body Dressing [] [] [] [x] [] [] [] [] [] []    Lower Body Dressing [] [] [] [] [] [x] [] [] [] []    Feeding [x] [] [] [] [] [] [] [] [] []    Grooming [] [] [] [x] [] [] [] [] [] []    Personal Device Care [] [] [] [] [] [] [] [] [] []    Functional Mobility [] [] [] [x] [] [] [] [] [] []    I=Independent, Mod I=Modified Independent, S=Supervision/Setup, SBA=Standby Assistance, CGA=Contact Guard Assistance, Min=Minimal Assistance, Mod=Moderate Assistance, Max=Maximal Assistance, Total=Total Assistance, NT=Not Tested    PLAN:   FREQUENCY/DURATION   OT Plan of Care:  (1 treatment) for duration of hospital stay or until stated goals are met, whichever comes first.    PROBLEM LIST:   (Skilled intervention is medically necessary to address:)  Increased Pain   INTERVENTIONS PLANNED:  (Benefits and precautions of occupational therapy have been discussed with the patient.)  Self Care Training  Education         TREATMENT:     EVALUATION: LOW COMPLEXITY: (Untimed Charge)    TREATMENT:   Self Care: (15 min): Procedure(s) (per grid) utilized to improve and/or restore self-care/home management as related to  functional mobility . Required minimal verbal cueing to facilitate activities of daily living skills and compensatory activities. AFTER TREATMENT PRECAUTIONS: Bed/Chair Locked, Call light within reach, Chair, Needs within reach, RN notified, and Visitors at bedside    INTERDISCIPLINARY COLLABORATION:  RN/ PCT, PT/ PTA, and OT/ FOX    EDUCATION:  Education Given To: Patient; Family  Education Provided: Plan of Care;Role of Therapy;Transfer Training;Equipment; Fall Prevention Strategies  Education Outcome: Verbalized understanding;Demonstrated understanding    TOTAL TREATMENT DURATION AND TIME:  Time In: 1120  Time Out: [facility]  Minutes: 25    Walt CABALLERO Maryfrances Spurling

## 2022-09-30 VITALS
HEART RATE: 80 BPM | BODY MASS INDEX: 33.21 KG/M2 | TEMPERATURE: 98.2 F | DIASTOLIC BLOOD PRESSURE: 87 MMHG | OXYGEN SATURATION: 96 % | HEIGHT: 70 IN | RESPIRATION RATE: 17 BRPM | SYSTOLIC BLOOD PRESSURE: 149 MMHG | WEIGHT: 232 LBS

## 2022-09-30 LAB
ALBUMIN SERPL-MCNC: 2.5 G/DL (ref 3.2–4.6)
ALBUMIN/GLOB SERPL: 0.8 (ref 1.2–3.5)
ALP SERPL-CCNC: 45 U/L (ref 50–136)
ALT SERPL-CCNC: 13 U/L (ref 12–65)
ANION GAP SERPL CALC-SCNC: 7 MMOL/L (ref 4–13)
AST SERPL-CCNC: 11 U/L (ref 15–37)
BASOPHILS # BLD: 0 K/UL (ref 0–0.2)
BASOPHILS NFR BLD: 0 % (ref 0–2)
BILIRUB SERPL-MCNC: 0.4 MG/DL (ref 0.2–1.1)
BUN SERPL-MCNC: 9 MG/DL (ref 8–23)
CALCIUM SERPL-MCNC: 7.8 MG/DL (ref 8.3–10.4)
CHLORIDE SERPL-SCNC: 106 MMOL/L (ref 101–110)
CO2 SERPL-SCNC: 27 MMOL/L (ref 21–32)
CREAT SERPL-MCNC: 1.05 MG/DL (ref 0.8–1.5)
DIFFERENTIAL METHOD BLD: ABNORMAL
EOSINOPHIL # BLD: 0.3 K/UL (ref 0–0.8)
EOSINOPHIL NFR BLD: 3 % (ref 0.5–7.8)
ERYTHROCYTE [DISTWIDTH] IN BLOOD BY AUTOMATED COUNT: 12.7 % (ref 11.9–14.6)
GLOBULIN SER CALC-MCNC: 3.1 G/DL (ref 2.3–3.5)
GLUCOSE SERPL-MCNC: 96 MG/DL (ref 65–100)
HCT VFR BLD AUTO: 32.7 % (ref 41.1–50.3)
HGB BLD-MCNC: 10.7 G/DL (ref 13.6–17.2)
IMM GRANULOCYTES # BLD AUTO: 0.1 K/UL (ref 0–0.5)
IMM GRANULOCYTES NFR BLD AUTO: 1 % (ref 0–5)
LYMPHOCYTES # BLD: 2 K/UL (ref 0.5–4.6)
LYMPHOCYTES NFR BLD: 22 % (ref 13–44)
MCH RBC QN AUTO: 28.4 PG (ref 26.1–32.9)
MCHC RBC AUTO-ENTMCNC: 32.7 G/DL (ref 31.4–35)
MCV RBC AUTO: 86.7 FL (ref 79.6–97.8)
MONOCYTES # BLD: 1 K/UL (ref 0.1–1.3)
MONOCYTES NFR BLD: 11 % (ref 4–12)
NEUTS SEG # BLD: 6 K/UL (ref 1.7–8.2)
NEUTS SEG NFR BLD: 63 % (ref 43–78)
NRBC # BLD: 0 K/UL (ref 0–0.2)
PLATELET # BLD AUTO: 379 K/UL (ref 150–450)
PMV BLD AUTO: 9 FL (ref 9.4–12.3)
POTASSIUM SERPL-SCNC: 3.6 MMOL/L (ref 3.5–5.1)
PROT SERPL-MCNC: 5.6 G/DL (ref 6.3–8.2)
RBC # BLD AUTO: 3.77 M/UL (ref 4.23–5.6)
SODIUM SERPL-SCNC: 140 MMOL/L (ref 136–145)
WBC # BLD AUTO: 9.4 K/UL (ref 4.3–11.1)

## 2022-09-30 PROCEDURE — 6360000002 HC RX W HCPCS: Performed by: NURSE PRACTITIONER

## 2022-09-30 PROCEDURE — 80053 COMPREHEN METABOLIC PANEL: CPT

## 2022-09-30 PROCEDURE — 36415 COLL VENOUS BLD VENIPUNCTURE: CPT

## 2022-09-30 PROCEDURE — 6370000000 HC RX 637 (ALT 250 FOR IP): Performed by: NURSE PRACTITIONER

## 2022-09-30 PROCEDURE — 85025 COMPLETE CBC W/AUTO DIFF WBC: CPT

## 2022-09-30 RX ORDER — TRAMADOL HYDROCHLORIDE 50 MG/1
50 TABLET ORAL EVERY 6 HOURS PRN
Qty: 12 TABLET | Refills: 0 | Status: SHIPPED | OUTPATIENT
Start: 2022-09-30 | End: 2022-10-05

## 2022-09-30 RX ORDER — COLCHICINE 0.6 MG/1
0.6 TABLET ORAL DAILY
Qty: 30 TABLET | Refills: 0 | Status: SHIPPED | OUTPATIENT
Start: 2022-09-30 | End: 2022-11-17

## 2022-09-30 RX ORDER — AMOXICILLIN AND CLAVULANATE POTASSIUM 875; 125 MG/1; MG/1
1 TABLET, FILM COATED ORAL EVERY 12 HOURS SCHEDULED
Qty: 11 TABLET | Refills: 0 | Status: SHIPPED | OUTPATIENT
Start: 2022-09-30 | End: 2022-10-06

## 2022-09-30 RX ORDER — PREDNISONE 10 MG/1
10 TABLET ORAL DAILY
Qty: 5 TABLET | Refills: 0 | Status: SHIPPED | OUTPATIENT
Start: 2022-09-30 | End: 2022-10-05

## 2022-09-30 RX ADMIN — Medication 100 MG: at 08:21

## 2022-09-30 RX ADMIN — METOCLOPRAMIDE 5 MG: 5 INJECTION, SOLUTION INTRAMUSCULAR; INTRAVENOUS at 05:59

## 2022-09-30 RX ADMIN — ASPIRIN 81 MG: 81 TABLET, COATED ORAL at 08:21

## 2022-09-30 RX ADMIN — LACTOBACILLUS TAB 4 TABLET: TAB at 08:22

## 2022-09-30 RX ADMIN — ENOXAPARIN SODIUM 30 MG: 100 INJECTION SUBCUTANEOUS at 08:21

## 2022-09-30 RX ADMIN — HYDROCODONE BITARTRATE AND ACETAMINOPHEN 1 TABLET: 5; 325 TABLET ORAL at 08:40

## 2022-09-30 RX ADMIN — MONTELUKAST 10 MG: 10 TABLET, FILM COATED ORAL at 08:25

## 2022-09-30 RX ADMIN — AMOXICILLIN AND CLAVULANATE POTASSIUM 1 TABLET: 875; 125 TABLET, FILM COATED ORAL at 08:22

## 2022-09-30 ASSESSMENT — PAIN SCALES - GENERAL
PAINLEVEL_OUTOF10: 3
PAINLEVEL_OUTOF10: 2

## 2022-09-30 ASSESSMENT — PAIN DESCRIPTION - LOCATION: LOCATION: KNEE

## 2022-09-30 ASSESSMENT — PAIN DESCRIPTION - ORIENTATION: ORIENTATION: LEFT

## 2022-09-30 ASSESSMENT — PAIN DESCRIPTION - DESCRIPTORS: DESCRIPTORS: ACHING;DISCOMFORT

## 2022-09-30 NOTE — PROGRESS NOTES
Patient escorted out via Wheelchair. IV has been removed. Wife at bedside. Discharge instructions reviewed with patient, verbalized understanding. Denies any further questions or concerns. AVS & scripts given. Patient discharged to home.

## 2022-09-30 NOTE — CARE COORDINATION
Patient is medically stable and ready for discharge today. Information about treatment plan for I&D today was incorrect or was changed. Pt anxious to return home. No discharge planning needs noted, however pt discharged prior to CM visit to confirm needs. Unable to deliver IM as patient left before CM arrived. 09/29/22 0911   Service Assessment   Patient Orientation Alert and Oriented;Person;Place;Situation;Self   Cognition Alert   History Provided By Patient   Primary Caregiver Self   Accompanied By/Relationship Myrtle Jerez, wife   Support Systems Spouse/Significant Other   PCP Verified by CM Yes   Last Visit to PCP Within last 3 months   Prior Functional Level Independent in ADLs/IADLs   Current Functional Level Assistance with the following:;Cooking   Can patient return to prior living arrangement Yes   Ability to make needs known: Good   Family able to assist with home care needs: Yes   Social/Functional History   Lives With Spouse   Type of [address] 65   Transfer Assistance Independent   Occupation Retired   Discharge Planning   Type of [address] Medications No   Type of [address] None   Patient expects to be discharged to: [address] Discharge   [address] Provided?  No   Mode of Transport at Discharge Self   Confirm Follow Up Transport Self   Condition of Participation: Discharge Planning   The Plan for Transition of Care is related to the following treatment goals: return to prior level of care

## 2022-09-30 NOTE — PROGRESS NOTES
General Surgery Progress Note    9/30/2022    Admit Date: 9/28/2022    Chief Complaint: Abdominal pain     HPI: Oliva Murphy is a 78 y.o. male who we are asked by ED Doctor  to see for Abdominal pain and Cellulitis noted to prior umbilical surgical site. Patient presented to ED today with multiple complaints including abdominal pain, redness and firmness to surrounding recent umbilical hernia repair site, scrotal swelling BL, left groin rash and Bilateral Knee pain. Patient reports the redness and warmth to the abdominal umbilical area began 2 to 3 days ago but  he denies fever or chills. He reports scant amount of tan drainage from umbilical surgical site on Saturday and Sunday. He reports he was nauseated on 9/26/2022 and took Phenergan and nausea was relieved. Patient reports he is having bowel movements and passing flatus . He denies any blood in stools. This morning,  patient reports he woke up and was having Bilateral knee pain and actually had to use crutches for ambulation. Pt took a Norco 5 mg and an Aleve but the pain did not resolve. Patient reports no known trauma or injury to knees. He also reports having a rash in his left groin area for the past 2 to 3 days and states \"it feels like I have been scraped with a razor blade in my groin. \"  Patient denies any chest pain, dizziness or dyspnea. Patient's surgical history includes  Laparoscopic Bilateral inguinal hernia repair with mesh and umbilical hernia repair with mesh done on 9/15/2022 per Dr. Toney Mckee and was discharged home same day of surgery. Patient was readmitted on 9/18/2022 due to Abdominal pain/nausea and vomiting and CT scan of Abdomen/Pelvis showed Small Bowel Obstruction. Initially on that admission, patient's SBO was managed with NG tube but on 9/19/2022, patient underwent EXPLORATORY LAPAROTOMY , REVISION OF MESH and LYSIS OF ADHESIONS. Patient progressed well and was discharged home on 9/23/2022. ER workup done 9/28/2022 includes CT scan of Abdomen/Pelvis which shows:  EXAMINATION: CT  ABDOMEN / PELVIS   9/28/2022 11:49 AM       ACCESSION NUMBER:  [id number]       INDICATION:  Abdominal pain with distention. Erythema and firmness at recent umbilical hernia repair site. Recent small bowel obstruction. COMPARISON: CT abdomen and pelvis, 9/18/2022       TECHNIQUE: Contiguous axial computed tomographic images were obtained from the domes of the diaphragm to the symphysis pubis after the intravenous administration of 100 mL of Isovue 370. Coronal and sagittal reconstructions were also performed. Radiation dose reduction techniques were used for this study:  Our CT scanners use one or all of the following: Automated exposure control, adjustment of the mA and/or kVp according to patient's size, iterative reconstruction. FINDINGS:       LOWER THORAX: Minimal bibasilar subsegmental atelectasis. Postsurgical changes of CABG with atherosclerotic calcifications in the native coronary arteries. LIVER: Simple cyst in segment 2 measuring 3.9 cm. There are a few additional smaller hypodensities which are difficult to characterize but stable, likely cysts or hemangiomas. BILIARY: Gallbladder is unremarkable. No intrahepatic or extrahepatic biliary   ductal dilation. SPLEEN: No splenomegaly or concerning lesion. PANCREAS: No pancreatic duct dilation or mass. ADRENALS: No adrenal nodules or hypertrophy. URINARY: Kidneys are normal in size and enhancement. No renal mass. No   hydronephrosis. No renal or ureteral calculi. Urinary bladder is unremarkable. BOWEL: The stomach is normal in caliber and wall thickness. There are several segments of borderline dilated small bowel centrally within the abdomen. A  few segments demonstrate mild wall thickening with mucosal hyperenhancement.         The more distal small bowel is decompressed with the transition point just to the right of midline in the body bilaterally anterior abdomen where there is a   segment of bowel wall thickening. No pneumatosis or free air. Colonic diverticulosis without evidence of diverticulitis. The appendix is normal.                   VASCULAR: The abdominal aorta and iliac arterial system are nonaneurysmal with   advanced atherosclerotic calcifications. There is likely high-grade stenosis   involving the proximal SMA due to bulky calcific plaque. NODES: No lymphadenopathy. FLUID: No free intraperitoneal fluid. REPRODUCTIVE: Unremarkable       BONES/SOFT TISSUES: There are postsurgical changes of recent umbilical hernia   repair. There is a new rim-enhancing gas and fluid containing collection at the   hernia repair site measuring 3.6 x 2.9 x 6.7 cm. There is also new fluid deep to   the collection with crescentic morphology, abutting the anterior peritoneal   reflection measuring 5.8 x 2.2 x 2.8 cm, likely in communication with one   another. Subcutis edema along the anterior abdominal wall. No acute or   aggressive osseous abnormality. Impression       1. Recent umbilical hernia repair with a new rim-enhancing gas and fluid   containing collection at the hernia repair site concerning for abscess. This   appears to communicate with an additional area of deeper extraperitoneal fluid   along the anterior peritoneal reflection. 2. Several of the adjacent small bowel segments in the anterior abdomen   demonstrate inflammatory changes and mild dilation with abrupt transition to   decompressed small bowel concerning for developing bowel obstruction. 3. 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 Subjective:     Patient resting in bed. Patient reports he is \"ready to get out of here. \"    Patient tolerated full liquid diet. Pt denies any nausea or vomiting.    Having BM's and passing flatus. Remains afebrile and WBC down to 9.4 . IV antibiotic discontinued yesterday and patient was placed on Augmentin. Patient should be ready for discharge today and follow up in office in 2 weeks. Patient can follow up with his PCP for further eval/treatment of BL Knee pain . Patient has been seen per Dietician and diet education has been provided. Objective:     /74   Pulse 77   Temp 97.9 °F (36.6 °C) (Oral)   Resp 18   Ht 5' 10\" (1.778 m)   Wt 232 lb (105.2 kg)   SpO2 94%   BMI 33.29 kg/m²       Intake/Output Summary (Last 24 hours) at 9/30/2022 0659  Last data filed at 9/29/2022 1740  Gross per 24 hour   Intake 540 ml   Output --   Net 540 ml      Physical Exam:  Vitals and nursing note reviewed  Constitutional:       General: Awake, alert and resting on stretcher . HENT:      Head: Normocephalic and atraumatic. Right Ear: External ear normal.      Left Ear: External ear normal.      Nose: No congestion or rhinorrhea     Mouth: Mucous membranes are moist.   Eyes:      Extraocular Movements: Extraocular movements intact. Pupils: Pupils are equal, round, and reactive to light. Cardiovascular:      Rate and Rhythm: Normal rate and regular rhythm. Heart sounds: Normal heart sounds. Pulmonary:      Effort: Pulmonary effort is normal.      Breath sounds: CTA BL with no wheezing, rales or rhonchi. Abdominal:      General: There is Mild distention noted with + bowel sounds x 4 quads. Palpations: Abdomen is obese. No HSM noted. Mild tenderness noted to umbilical surgical site which is approximated with steri-strips and old dried blood noted to site. There is surrounding erythema, warmth and induration noted to periumbilical surgical site but much improved. No obvious fluctuant collection under umbilical skin area. Musculoskeletal:         General: No swelling or tenderness.  Normal range of motion to all joints but patient reports pain with ambulation to BL knees. Negative boni's sign. No Erythema to Knees. Cap refill < 2 seconds. NVI distally. 2 + pulses BL LE's  Skin:     Left inguinal fold area with moist, exudative rash. Mild scrotal edema present. Neurological:      General: No focal deficit present. Mental Status: He is alert and oriented to person, place, and time.    Psychiatric:         Mood and Affect: Mood normal.          Data Review    Recent Results (from the past 24 hour(s))   Comprehensive Metabolic Panel w/ Reflex to MG    Collection Time: 09/30/22  4:18 AM   Result Value Ref Range    Sodium 140 136 - 145 mmol/L    Potassium 3.6 3.5 - 5.1 mmol/L    Chloride 106 101 - 110 mmol/L    CO2 27 21 - 32 mmol/L    Anion Gap 7 4 - 13 mmol/L    Glucose 96 65 - 100 mg/dL    BUN 9 8 - 23 MG/DL    Creatinine 1.05 0.8 - 1.5 MG/DL    GFR African American >60 >60 ml/min/1.73m2    GFR Non- >60 >60 ml/min/1.73m2    Calcium 7.8 (L) 8.3 - 10.4 MG/DL    Total Bilirubin 0.4 0.2 - 1.1 MG/DL    ALT 13 12 - 65 U/L    AST 11 (L) 15 - 37 U/L    Alk Phosphatase 45 (L) 50 - 136 U/L    Total Protein 5.6 (L) 6.3 - 8.2 g/dL    Albumin 2.5 (L) 3.2 - 4.6 g/dL    Globulin 3.1 2.3 - 3.5 g/dL    Albumin/Globulin Ratio 0.8 (L) 1.2 - 3.5     CBC with Auto Differential    Collection Time: 09/30/22  4:18 AM   Result Value Ref Range    WBC 9.4 4.3 - 11.1 K/uL    RBC 3.77 (L) 4.23 - 5.6 M/uL    Hemoglobin 10.7 (L) 13.6 - 17.2 g/dL    Hematocrit 32.7 (L) 41.1 - 50.3 %    MCV 86.7 79.6 - 97.8 FL    MCH 28.4 26.1 - 32.9 PG    MCHC 32.7 31.4 - 35.0 g/dL    RDW 12.7 11.9 - 14.6 %    Platelets 644 695 - 163 K/uL    MPV 9.0 (L) 9.4 - 12.3 FL    nRBC 0.00 0.0 - 0.2 K/uL    Differential Type AUTOMATED      Seg Neutrophils 63 43 - 78 %    Lymphocytes 22 13 - 44 %    Monocytes 11 4.0 - 12.0 %    Eosinophils % 3 0.5 - 7.8 %    Basophils 0 0.0 - 2.0 %    Immature Granulocytes 1 0.0 - 5.0 %    Segs Absolute 6.0 1.7 - 8.2 K/UL    Absolute Lymph # 2.0 0.5 - 4.6 K/UL Absolute Mono # 1.0 0.1 - 1.3 K/UL    Absolute Eos # 0.3 0.0 - 0.8 K/UL    Basophils Absolute 0.0 0.0 - 0.2 K/UL    Absolute Immature Granulocyte 0.1 0.0 - 0.5 K/UL          ASSESSMENT:  Principal Problem:    Postoperative wound infection  Resolved Problems:    * No resolved hospital problems.  *    PLAN:  -Further Input from Attending Daija Lopez  -Continue Augmentin  -Patient can follow up with his PCP and/or Ortho MD as outpatient  -Discharge home per admitting team today    An STORM-BC

## 2022-09-30 NOTE — PLAN OF CARE
Problem: Discharge Planning  Goal: Discharge to home or other facility with appropriate resources  Outcome: Progressing  Flowsheets (Taken 9/29/2022 1932)  Discharge to home or other facility with appropriate resources:   Identify barriers to discharge with patient and caregiver   Identify discharge learning needs (meds, wound care, etc)     Problem: Pain  Goal: Verbalizes/displays adequate comfort level or baseline comfort level  Outcome: Progressing     Problem: Skin/Tissue Integrity  Goal: Absence of new skin breakdown  Description: 1. Monitor for areas of redness and/or skin breakdown  2. Assess vascular access sites hourly  3. Every 4-6 hours minimum:  Change oxygen saturation probe site  4. Every 4-6 hours:  If on nasal continuous positive airway pressure, respiratory therapy assess nares and determine need for appliance change or resting period.   Outcome: Progressing     Problem: ABCDS Injury Assessment  Goal: Absence of physical injury  Outcome: Progressing

## 2022-09-30 NOTE — DISCHARGE SUMMARY
Hospitalist Discharge Summary   Admit Date:  2022  9:56 AM   DC Note date: 2022  Name:  Edmar Greer. Age:  78 y.o. Sex:  male  :  1945   MRN:  [id number]   Room:  [facility]  PCP:  Vinh Higgins MD    Presenting Complaint: Post-op Problem and Knee Pain     Initial Admission Diagnosis: Postoperative wound infection [T81.49XA]  Postoperative infection, unspecified type, initial encounter [T81.40XA]     Problem List for this Hospitalization (present on admission):    Principal Problem:    Postoperative wound infection  Resolved Problems:    * No resolved hospital problems. [facility] Course: Edmar Trinidad is a 78 y.o. male with medical history of PAF, HTN, CAD, Depression who presented with c/o erythema, drainage from umbilical surgical incision. Pt had recent admission for laparoscopic bilateral inguinal hernia repair with mesh and umbilical hernia repair with mesh 9/15 per Dr. Michael Lagunas.  pt was readmitted for n/v, SBO requiring ex lap, revision of mesh and lysis of adhesions, DC . CT scan this admission concerning for developing SBO and findings concerning for abscess. Patient started on IV empiric antibiotics and supportive care for small bowel obstruction. General surgery consulted and recommended continue conservative management. Patient with improvement in symptoms and patient switched to oral antibiotics to complete course for 14 days. Patient had a bowel movement. General surgery recommended to keep patient on full liquids for few more days to minimize distention and advance diet slowly at home. General surgery okay to discharge patient from surgical standpoint. Patient also complaining of bilateral knee pain with ambulation. Bilateral lower extremity ultrasound negative for DVT. Uric acid normal.  X-ray left knee with small joint effusion. Patient reported history of gout. Patient started on colchicine.   Also short course of prednisone prescribed if no improvement in few days. Patient to follow-up with Ortho outpatient. PT/OT recommend home health. All other chronic conditions stable and we continue to single home meds. No new complaint on the day of discharge. Patient is stable to discharge home today with close follow-up with PCP, Ortho and general surgery. Disposition: Home Health  Diet: ADULT DIET; Full Liquid  ADULT ORAL NUTRITION SUPPLEMENT; Breakfast, Lunch, Dinner; Standard High Calorie/High Protein Oral Supplement  Code Status: Full Code    Follow Ups:   Follow-up Information     [facility]. Call. Why: Knee pain and joint effusion  Contact information:  [address]           Shashi Orellana MD Follow up in 3 day(s). Specialty: Family Medicine  Contact information:  [address]  [phone]             Shashi Orellana MD .    Specialty: Family Medicine  Contact information:  [address]  [phone]             Anand Rivera MD Follow up in 1 week(s). Specialty: General Surgery  Contact information:  [address]  [phone]                       Time spent in patient discharge and coordination 38 minutes. Plan was discussed with patient and wife. All questions answered. Patient was stable at time of discharge. Instructions given to call a physician or return if any concerns. Current Discharge Medication List        START taking these medications    Details   amoxicillin-clavulanate (AUGMENTIN) 875-125 MG per tablet Take 1 tablet by mouth every 12 hours for 11 doses  Qty: 11 tablet, Refills: 0      colchicine (COLCRYS) 0.6 MG tablet Take 1 tablet by mouth daily  Qty: 30 tablet, Refills: 0      traMADol (ULTRAM) 50 MG tablet Take 1 tablet by mouth every 6 hours as needed for Pain for up to 5 days. Intended supply: 5 days.  Take lowest dose possible to manage pain  Qty: 12 tablet, Refills: 0    Comments: Reduce doses taken as pain becomes manageable  Associated Diagnoses: Postoperative wound infection      predniSONE (DELTASONE) 10 MG tablet Take 1 tablet by mouth daily for 5 days  Qty: 5 tablet, Refills: 0           CONTINUE these medications which have NOT CHANGED    Details   promethazine (PHENERGAN) 12.5 MG tablet Take 1 tablet by mouth 3 times daily as needed for Nausea  Qty: 12 tablet, Refills: 0      docusate sodium (COLACE, DULCOLAX) 100 MG CAPS Take 100 mg by mouth 2 times daily as needed for Constipation  Qty: 60 capsule, Refills: 0      aspirin 81 MG EC tablet Take 81 mg by mouth in the morning. APPLE CIDER VINEGAR PO Take by mouth      GARLIC PO Take by mouth      HAWTHORN BERRY PO Take by mouth      LECITHIN PO Take by mouth      alirocumab (PRALUENT) 75 MG/ML SOAJ injection pen Inject 75 mg into the skin every 14 days      ascorbic acid (VITAMIN C) 500 MG tablet Take 1,000 mg by mouth      clopidogrel (PLAVIX) 75 MG tablet Take 75 mg by mouth in the morning. Coenzyme Q10 10 MG CAPS Take by mouth      cyanocobalamin 100 MCG tablet Take 400 mcg by mouth daily      LORazepam (ATIVAN) 1 MG tablet Take 1 mg by mouth 2 times daily as needed.       montelukast (SINGULAIR) 10 MG tablet TAKE 1 TABLET BY MOUTH EVERY DAY      PARoxetine (PAXIL) 20 MG tablet Take 30 mg by mouth every evening      potassium chloride (KLOR-CON M) 20 MEQ extended release tablet Take 20 mEq by mouth daily as needed      thiamine 100 MG tablet Take 100 mg by mouth daily      vitamin E 400 UNIT capsule Take by mouth daily           STOP taking these medications       furosemide (LASIX) 40 MG tablet Comments:   Reason for Stopping:         losartan (COZAAR) 100 MG tablet Comments:   Reason for Stopping:         metoprolol succinate (TOPROL XL) 25 MG extended release tablet Comments:   Reason for Stopping:         nitroGLYCERIN (NITROSTAT) 0.4 MG SL tablet Comments:   Reason for Stopping:         omeprazole (PRILOSEC) 20 MG delayed release capsule Comments:   Reason for Stopping:         raNITIdine (ZANTAC) 150 MG tablet Comments:   Reason for Stopping:         valsartan (DIOVAN) 80 MG tablet Comments:   Reason for Stopping:               Procedures done this admission:  * No surgery found *    Consults this admission:  IP CONSULT TO GENERAL SURGERY    Echocardiogram results:  No results found for this or any previous visit. Diagnostic Imaging/Tests:   XR CHEST (2 VW)    Result Date: 9/29/2022  1. No evidence of pneumonia or pulmonary edema. XR KNEE LEFT (MIN 4 VIEWS)    Result Date: 9/29/2022  Small joint effusion. XR ABDOMEN (KUB) (SINGLE AP VIEW)    Result Date: 9/19/2022  Enteric tube advanced with tip overlying the proximal stomach. XR ABDOMEN (KUB) (SINGLE AP VIEW)    Result Date: 9/19/2022  As above. CT ABDOMEN PELVIS W IV CONTRAST Additional Contrast? None    Result Date: 9/28/2022  1. Recent umbilical hernia repair with a new rim-enhancing gas and fluid containing collection at the hernia repair site concerning for abscess. This appears to communicate with an additional area of deeper extraperitoneal fluid along the anterior peritoneal reflection. 2. Several of the adjacent small bowel segments in the anterior abdomen demonstrate inflammatory changes and mild dilation with abrupt transition to decompressed small bowel concerning for developing bowel obstruction. 3. Advanced atherosclerosis with probable high-grade stenosis in the proximal SMA. CT ABDOMEN PELVIS W IV CONTRAST Additional Contrast? None    Result Date: 9/18/2022  1. Possible small bowel obstruction. 2.  Surgical changes in the abdominal wall and bilateral inguinal regions. 3.  Trace free fluid. GI details are limited without oral contrast.    Vascular duplex lower extremity venous bilateral    Result Date: 9/28/2022  1.  No evidence for lower extremity DVT in either leg.      XR ABDOMEN (2 VIEWS)    Result Date: 9/20/2022  Nonspecific bowel gas pattern with dilated loops of small and large bowel throughout the abdomen and pelvis. Ileus or partial small bowel obstruction could be considered.        Labs: Results:       BMP, Mg, Phos Recent Labs     09/28/22  1042 09/29/22 0423 09/30/22 0418    139 140   K 3.5 3.6 3.6    105 106   CO2 28 27 27   ANIONGAP 7 7 7   BUN 9 11 9   CREATININE 1.00 1.39 1.05   LABGLOM >60 53* >60   GFRAA >60 >60 >60   CALCIUM 9.1 8.4 7.8*   GLUCOSE 125* 119* 96      CBC Recent Labs     09/28/22  1042 09/29/22 0423 09/30/22 0418   WBC 12.0* 10.7 9.4   RBC 4.32 4.04* 3.77*   HGB 12.2* 11.4* 10.7*   HCT 37.1* 35.4* 32.7*   MCV 85.9 87.6 86.7   MCH 28.2 28.2 28.4   MCHC 32.9 32.2 32.7   RDW 12.6 12.6 12.7    405 379   MPV 8.6* 8.9* 9.0*   NRBC 0.00 0.00 0.00   SEGS 72 79* 63   LYMPHOPCT 13 10* 22   EOSRELPCT 2 3 3   MONOPCT 13* 6 11   BASOPCT 0 0 0   IMMGRAN 1 1 1   SEGSABS 8.6* 8.5* 6.0   LYMPHSABS 1.5 1.1 2.0   EOSABS 0.2 0.3 0.3   MONOSABS 1.5* 0.7 1.0   BASOSABS 0.1 0.0 0.0   ABSIMMGRAN 0.1 0.1 0.1      LFT Recent Labs     09/28/22  1042 09/30/22 0418   BILITOT 0.5 0.4   ALKPHOS 54 45*   AST 13* 11*   ALT 17 13   PROT 6.6 5.6*   LABALBU 2.9* 2.5*   GLOB 3.7* 3.1      Cardiac  No results found for: NTPROBNP, TROPHS   Coags Lab Results   Component Value Date/Time    PROTIME 12.6 09/16/2019 07:42 AM    INR 0.9 09/16/2019 07:42 AM      A1c Lab Results   Component Value Date/Time    LABA1C 5.3 07/07/2021 04:00 PM     07/07/2021 04:00 PM      Lipids Lab Results   Component Value Date/Time    CHOL 202 05/12/2022 04:16 PM    LDLCALC 96 05/12/2022 04:16 PM    LABVLDL 51 05/12/2022 04:16 PM    HDL 55 05/12/2022 04:16 PM    CHOLHDLRATIO 3.7 05/12/2022 04:16 PM    TRIG 255 05/12/2022 04:16 PM      Thyroid  No results found for: TSHELE, GPC3KWQ     Most Recent UA Lab Results   Component Value Date/Time    COLORU YELLOW/STRAW 09/28/2022 01:39 PM    APPEARANCE CLEAR 09/28/2022 01:39 PM    SPECGRAV 1.021 09/28/2022 01:39 PM    LABPH 6.5 09/28/2022 01:39 PM    PROTEINU Negative 09/28/2022 01:39 PM    GLUCOSEU Negative 09/28/2022 01:39 PM    KETUA Negative 09/28/2022 01:39 PM    BILIRUBINUR Negative 09/28/2022 01:39 PM    BLOODU Negative 09/28/2022 01:39 PM    UROBILINOGEN 0.2 09/28/2022 01:39 PM    NITRU Negative 09/28/2022 01:39 PM    LEUKOCYTESUR Negative 09/28/2022 01:39 PM        Recent Labs     09/28/22  1720 09/28/22  1042   CULTURE NO GROWTH 2 DAYS NO GROWTH 2 DAYS       All Labs from Last 24 Hrs:  Recent Results (from the past 24 hour(s))   Comprehensive Metabolic Panel w/ Reflex to MG    Collection Time: 09/30/22  4:18 AM   Result Value Ref Range    Sodium 140 136 - 145 mmol/L    Potassium 3.6 3.5 - 5.1 mmol/L    Chloride 106 101 - 110 mmol/L    CO2 27 21 - 32 mmol/L    Anion Gap 7 4 - 13 mmol/L    Glucose 96 65 - 100 mg/dL    BUN 9 8 - 23 MG/DL    Creatinine 1.05 0.8 - 1.5 MG/DL    GFR African American >60 >60 ml/min/1.73m2    GFR Non- >60 >60 ml/min/1.73m2    Calcium 7.8 (L) 8.3 - 10.4 MG/DL    Total Bilirubin 0.4 0.2 - 1.1 MG/DL    ALT 13 12 - 65 U/L    AST 11 (L) 15 - 37 U/L    Alk Phosphatase 45 (L) 50 - 136 U/L    Total Protein 5.6 (L) 6.3 - 8.2 g/dL    Albumin 2.5 (L) 3.2 - 4.6 g/dL    Globulin 3.1 2.3 - 3.5 g/dL    Albumin/Globulin Ratio 0.8 (L) 1.2 - 3.5     CBC with Auto Differential    Collection Time: 09/30/22  4:18 AM   Result Value Ref Range    WBC 9.4 4.3 - 11.1 K/uL    RBC 3.77 (L) 4.23 - 5.6 M/uL    Hemoglobin 10.7 (L) 13.6 - 17.2 g/dL    Hematocrit 32.7 (L) 41.1 - 50.3 %    MCV 86.7 79.6 - 97.8 FL    MCH 28.4 26.1 - 32.9 PG    MCHC 32.7 31.4 - 35.0 g/dL    RDW 12.7 11.9 - 14.6 %    Platelets 352 450 - 181 K/uL    MPV 9.0 (L) 9.4 - 12.3 FL    nRBC 0.00 0.0 - 0.2 K/uL    Differential Type AUTOMATED      Seg Neutrophils 63 43 - 78 %    Lymphocytes 22 13 - 44 %    Monocytes 11 4.0 - 12.0 %    Eosinophils % 3 0.5 - 7.8 %    Basophils 0 0.0 - 2.0 %    Immature Granulocytes 1 0.0 - 5.0 %    Segs Absolute 6.0 1.7 - 8.2 K/UL    Absolute Lymph # 2.0 0.5 - 4.6 K/UL    Absolute Mono # 1.0 0.1 - 1.3 K/UL    Absolute Eos # 0.3 0.0 - 0.8 K/UL    Basophils Absolute 0.0 0.0 - 0.2 K/UL    Absolute Immature Granulocyte 0.1 0.0 - 0.5 K/UL       Allergies   Allergen Reactions    Indomethacin Shortness Of Breath     Severe dizziness    Atorvastatin Other (See Comments)    Rosuvastatin Other (See Comments)     CONFUSION     Immunization History   Administered Date(s) Administered    COVID-19, PFIZER PURPLE top, DILUTE for use, (age 15 y+), 30mcg/0.3mL 02/16/2021, 03/22/2021    Influenza, FLUARIX, FLULAVAL, FLUZONE (age 10 mo+) AND AFLURIA, (age 1 y+), PF, 0.5mL 09/17/2019    Influenza, High Dose (Fluzone 65 yrs and older) 10/03/2016    Pneumococcal Conjugate 13-valent ([id number]) 11/01/2016       Recent Vital Data:  Patient Vitals for the past 24 hrs:   Temp Pulse Resp BP SpO2   09/30/22 0751 98.2 °F (36.8 °C) 80 17 (!) 149/87 96 %   09/30/22 0353 97.9 °F (36.6 °C) 77 18 123/74 94 %   09/29/22 2312 98.4 °F (36.9 °C) 85 18 125/60 97 %   09/29/22 1933 98.1 °F (36.7 °C) 84 18 124/68 94 %   09/29/22 1603 98.1 °F (36.7 °C) 78 16 104/83 93 %       Oxygen Therapy  SpO2: 96 %  O2 Device: None (Room air)    Estimated body mass index is 33.29 kg/m² as calculated from the following:    Height as of this encounter: 5' 10\" (1.778 m). Weight as of this encounter: 232 lb (105.2 kg). Intake/Output Summary (Last 24 hours) at 9/30/2022 1131  Last data filed at 9/30/2022 0840  Gross per 24 hour   Intake 680 ml   Output --   Net 680 ml         Physical Exam:  General:    No overt distress  Head:  Normocephalic, atraumatic  Eyes:  Sclerae appear normal.  Pupils equally round. HENT:  Nares appear normal, no drainage. Moist mucous membranes  Neck:  No restricted ROM. Trachea midline  CV:   RRR. No m/r/g. No JVD  Lungs:   CTAB.   No wheezing, rhonchi, or rales.  Respirations even, unlabored  Abdomen:   Soft, nontender, nondistended. Mild tenderness at umbilical surgical site, no fluctuation, dressing dry intact,  surrounding erythema and induration but much improved. Extremities: Warm and dry. No erythema or tenderness on palpation to bilateral knees. Negative Homans signs. Patient complaining of pain with ambulation more on the left side  Skin:     No rashes. Normal coloration  Neuro:  CN II-XII grossly intact. Psych:  Normal mood and affect. Signed:  Vira Mercado MD    Part of this note may have been written by using a voice dictation software. The note has been proof read but may still contain some grammatical/other typographical errors.

## 2022-09-30 NOTE — PROGRESS NOTES
Physician Progress Note      PATIENT:               Sathya Cotton  CSN #:                  [id number]  :                       1945  ADMIT DATE:       2022 9:56 AM  DISCH DATE:  RESPONDING  PROVIDER #:        Shashank Peralta NP        QUERY TEXT:    Stage of Chronic Kidney Disease: Please provide further specificity, if known. Clinical indicators include: chronic kidney disease, bun, creatinine, gfr  Options provided:  -- Chronic kidney disease stage 1  -- Chronic kidney disease stage 2  -- Chronic kidney disease stage 3  -- Chronic kidney disease stage 3a  -- Chronic kidney disease stage 3b  -- Chronic kidney disease stage 4  -- Chronic kidney disease stage 5  -- Chronic kidney disease stage 5, requiring dialysis  -- End stage renal disease  -- Other - I will add my own diagnosis  -- Disagree - Not applicable / Not valid  -- Disagree - Clinically Unable to determine / Unknown        PROVIDER RESPONSE TEXT:    The patient has chronic kidney disease stage 2.       Electronically signed by:  Shahsank Peralta NP 2022 9:54 AM

## 2022-10-03 ENCOUNTER — TELEPHONE (OUTPATIENT)
Dept: FAMILY MEDICINE CLINIC | Facility: CLINIC | Age: 77
End: 2022-10-03

## 2022-10-03 DIAGNOSIS — K55.1 MESENTERIC ARTERY STENOSIS (HCC): Primary | ICD-10-CM

## 2022-10-03 LAB
BACTERIA SPEC CULT: NORMAL
BACTERIA SPEC CULT: NORMAL
SERVICE CMNT-IMP: NORMAL
SERVICE CMNT-IMP: NORMAL

## 2022-10-06 ENCOUNTER — OFFICE VISIT (OUTPATIENT)
Dept: SURGERY | Age: 77
End: 2022-10-06

## 2022-10-06 DIAGNOSIS — K40.20 NON-RECURRENT BILATERAL INGUINAL HERNIA WITHOUT OBSTRUCTION OR GANGRENE: Primary | ICD-10-CM

## 2022-10-06 DIAGNOSIS — K42.9 UMBILICAL HERNIA WITHOUT OBSTRUCTION AND WITHOUT GANGRENE: ICD-10-CM

## 2022-10-06 PROCEDURE — 99024 POSTOP FOLLOW-UP VISIT: CPT | Performed by: SURGERY

## 2022-10-10 NOTE — PROGRESS NOTES
S/p lap BIH/umbilical hernia with immediate post-op SBO requiring mini-exploration to lyse mesh adhesions 9/19. Treated for cellulitis at umbilical site  GI function delayed. Has remained on cleaer/full liquids since discharge    Currently feeling much better. Distention better; bowels regulating. Does note occasional nausea, not timed with eating. Exam:  Umbilical site without any active cellulitis  Central abd skin, previously red, is desquamating  Incision looks good- no skin separation, no seroma  Abdomen- protuberant, soft    Doing well s/p umbi + lap Bih complicated by SBO likely adhesion to umbilical mesh requiring minilap. Advance diet slowly, as tolerated    Pt mentions thickening of his scrotum on left, which concerns him about his underlying diagnosis- he again reports that his initial symptom was discomfort in his left groin, feelng a \"gush\" or rush in the inguinal crease. He wonders if this is related. Exam does reveal significant thickening of the spermatic cords, bilaterally, without unusual testicular tenderness or engorgement. Most recent CT reviewed- there is some fat within the cord L>R, not likely to produce today's exam.  Suspect edema/hematoma in cord. Encouraged increasing activity  Slowly advance dieet  Follow-up and Dispositions    Return for as needed.

## 2022-11-17 ENCOUNTER — OFFICE VISIT (OUTPATIENT)
Dept: CARDIOLOGY CLINIC | Age: 77
End: 2022-11-17
Payer: MEDICARE

## 2022-11-17 VITALS
HEART RATE: 72 BPM | SYSTOLIC BLOOD PRESSURE: 122 MMHG | HEIGHT: 70 IN | WEIGHT: 223 LBS | DIASTOLIC BLOOD PRESSURE: 60 MMHG | BODY MASS INDEX: 31.92 KG/M2

## 2022-11-17 DIAGNOSIS — I25.10 CORONARY ARTERY DISEASE INVOLVING NATIVE CORONARY ARTERY OF NATIVE HEART WITHOUT ANGINA PECTORIS: ICD-10-CM

## 2022-11-17 DIAGNOSIS — Z95.1 S/P CABG (CORONARY ARTERY BYPASS GRAFT): ICD-10-CM

## 2022-11-17 DIAGNOSIS — I10 PRIMARY HYPERTENSION: ICD-10-CM

## 2022-11-17 DIAGNOSIS — I70.0 CALCIFICATION OF ABDOMINAL AORTA (HCC): ICD-10-CM

## 2022-11-17 DIAGNOSIS — Z98.61 S/P PTCA (PERCUTANEOUS TRANSLUMINAL CORONARY ANGIOPLASTY): ICD-10-CM

## 2022-11-17 DIAGNOSIS — K42.9 UMBILICAL HERNIA WITHOUT OBSTRUCTION AND WITHOUT GANGRENE: ICD-10-CM

## 2022-11-17 DIAGNOSIS — I48.0 PAROXYSMAL ATRIAL FIBRILLATION (HCC): Primary | ICD-10-CM

## 2022-11-17 DIAGNOSIS — K56.609 SMALL BOWEL OBSTRUCTION (HCC): ICD-10-CM

## 2022-11-17 DIAGNOSIS — E78.5 DYSLIPIDEMIA: ICD-10-CM

## 2022-11-17 PROCEDURE — 3074F SYST BP LT 130 MM HG: CPT | Performed by: INTERNAL MEDICINE

## 2022-11-17 PROCEDURE — 1036F TOBACCO NON-USER: CPT | Performed by: INTERNAL MEDICINE

## 2022-11-17 PROCEDURE — 99214 OFFICE O/P EST MOD 30 MIN: CPT | Performed by: INTERNAL MEDICINE

## 2022-11-17 PROCEDURE — G8428 CUR MEDS NOT DOCUMENT: HCPCS | Performed by: INTERNAL MEDICINE

## 2022-11-17 PROCEDURE — G8417 CALC BMI ABV UP PARAM F/U: HCPCS | Performed by: INTERNAL MEDICINE

## 2022-11-17 PROCEDURE — 1123F ACP DISCUSS/DSCN MKR DOCD: CPT | Performed by: INTERNAL MEDICINE

## 2022-11-17 PROCEDURE — 3017F COLORECTAL CA SCREEN DOC REV: CPT | Performed by: INTERNAL MEDICINE

## 2022-11-17 PROCEDURE — G8484 FLU IMMUNIZE NO ADMIN: HCPCS | Performed by: INTERNAL MEDICINE

## 2022-11-17 PROCEDURE — 3078F DIAST BP <80 MM HG: CPT | Performed by: INTERNAL MEDICINE

## 2022-11-17 RX ORDER — ALIROCUMAB 75 MG/ML
75 INJECTION, SOLUTION SUBCUTANEOUS
Qty: 2.24 ML | Refills: 5 | Status: SHIPPED | OUTPATIENT
Start: 2022-11-17

## 2022-11-17 ASSESSMENT — ENCOUNTER SYMPTOMS: ABDOMINAL PAIN: 0

## 2022-11-17 NOTE — PROGRESS NOTES
[address], [address] [facility], [address] [place] [address]  PHONE: [phone]    Omie Mortimer.  1945      SUBJECTIVE:   Omie Mortimer. is a 78 y.o. male seen for a follow up visit regarding the following:     Chief Complaint   Patient presents with    Hypertension       HPI:    51-year-old male comes back for follow-up as had a long history of some coronary disease previous bypass surgery stenting which has been stable from. He has had hyperlipidemia been on and off different meds over the years and finally is taking Praluent injections. Recently he was lifting something and strained had bilateral inguinal hernias and umbilical hernia. He underwent laparoscopic surgery and mesh to both inguinal hernias and umbilical hernia and went home. 3 days later he returned with severe abdominal pain and distention and had an ileus had exploratory and had lysis of adhesions and the mesh. He finally went home from there came back again in the September with similar symptoms and they gave him IV antibiotics and it finally got better he did not have to have a repeat surgery. He had multiple CT scans of his abdomen which showed that he had some calcification of his abdominal aorta down to his iliacs into this SMA. No aneurysm noted abdominal aorta. Wife is concerned about his calcium which is very common in people with atherosclerotic disease is explained to her. Patient never had any type of pain or abdominal discomfort when eating to suggest intestinal angina      Past Medical History, Past Surgical History, Family history, Social History, and Medications were all reviewed with the patient today and updated as necessary.        Allergies   Allergen Reactions    Indomethacin Shortness Of Breath     Severe dizziness    Atorvastatin Other (See Comments)    Rosuvastatin Other (See Comments)     CONFUSION     Past Medical History:   Diagnosis Date    Anemia     Anxiety     Asthma     childhood    BMI 33.0-33.9,adult     CAD (coronary artery disease)     CABG/Stents; denies MI; followed by [address] [facility]    Calcification of abdominal aorta (HCC)     Cardiac arrhythmia     atrial fib    Chronic kidney disease     Dyslipidemia     Former smoker     GERD (gastroesophageal reflux disease)     Gout     HLD (hyperlipidemia)     Hypercholesterolemia     Hypertension     Insomnia     Liver disease     ?? growth on liver    Mild episode of recurrent major depressive disorder (HCC)     HAY (obstructive sleep apnea)     non-compliant with c-pap    Paroxysmal atrial fibrillation ([facility]     Poor historian     Prostatism     PUD (peptic ulcer disease)     HX    Stroke ([facility]     DENIES (noted 22)     Past Surgical History:   Procedure Laterality Date    CABG, ARTERIAL, THREE  4/13/11    x3    CARDIAC CATHETERIZATION  2011    CORONARY ANGIOPLASTY WITH STENT PLACEMENT  2019    patient thinks 4 total stents; last in 2019=Successful angioplasty and stenting of the vein graft to the RCA.     INGUINAL HERNIA REPAIR Bilateral 9/15/2022    LAPAROSCOPIC BILATERAL INGUINAL HERNIA REPAIR WITH MESH performed by Priscilla Dominique MD at [address] [place] [address] N/A 2022    LAPAROTOMY EXPLORATORY, REVISION OF MESH, LYSIS OF ADHESIONS performed by Priscilla Dominique MD at [address] [place] [address]      right shoulder, left knee, and left foot    SINUS SURGERY      TESTICLE SURGERY      UMBILICAL HERNIA REPAIR N/A     HERNIA UMBILICAL REPAIR performed by Priscilla Dominique MD at [address] [facility]       Family History   Problem Relation Age of Onset    Alzheimer's Disease Mother     Stroke Father     Heart Disease Father       Social History     Tobacco Use    Smoking status: Former     Packs/day: 1.50     Types: Cigarettes     Quit date: 1970     Years since quittin.5    Smokeless tobacco: Former     Quit date: 2018    Tobacco comments:     Quit smoking: chews tobacco   Substance Use Topics    Alcohol use: Yes     Comment: occ       ROS:    Review of Systems   Constitutional: Negative for decreased appetite and weight loss. Cardiovascular:  Negative for chest pain, dyspnea on exertion and leg swelling. Hematologic/Lymphatic: Negative for bleeding problem. Gastrointestinal:  Negative for abdominal pain. Neurological:  Negative for dizziness and focal weakness. PHYSICAL EXAM:    /60   Pulse 72   Ht 5' 10\" (1.778 m)   Wt 223 lb (101.2 kg)   BMI 32.00 kg/m²        Wt Readings from Last 3 Encounters:   11/17/22 223 lb (101.2 kg)   09/28/22 232 lb (105.2 kg)   09/18/22 232 lb (105.2 kg)     BP Readings from Last 3 Encounters:   11/17/22 122/60   09/30/22 (!) 149/87   09/23/22 (!) 152/73         Physical Exam  Constitutional:       General: He is not in acute distress. Cardiovascular:      Rate and Rhythm: Normal rate and regular rhythm. Heart sounds: No murmur heard. No gallop. Pulmonary:      Effort: Pulmonary effort is normal.      Breath sounds: Normal breath sounds. No rales. Abdominal:      General: There is no distension. Musculoskeletal:         General: No swelling. Neurological:      Mental Status: He is alert. Medical problems and test results were reviewed with the patient today. No results found for any visits on 11/17/22. Lab Results   Component Value Date/Time     09/30/2022 04:18 AM    K 3.6 09/30/2022 04:18 AM     09/30/2022 04:18 AM    CO2 27 09/30/2022 04:18 AM    BUN 9 09/30/2022 04:18 AM    GFRAA >60 09/30/2022 04:18 AM     Lab Results   Component Value Date/Time    CHOL 202 05/12/2022 04:16 PM    HDL 55 05/12/2022 04:16 PM   I reviewed his recent hospital records. Reviewed the CT scan showing calcium in his abdominal aorta no aneurysm noted. His calcium and SMA      ASSESSMENT and Oscar Alcocer was seen today for hypertension.     Diagnoses and all orders for this visit:    Paroxysmal atrial fibrillation ([address] patient is in a regular rhythm he had no complications during surgery    Primary hypertension    Calcification of abdominal aorta (HCC) has calcification of abdominal aorta and plaque there. No aneurysm detected no symptoms suggest intestinal angina    Coronary artery disease involving native coronary artery of native heart without angina pectoris    Small bowel obstruction (HCC) patient ended up with small bowel obstruction adhesions after his hernia surgeries seems to be better now his abdomen is soft    S/P CABG (coronary artery bypass graft)  Said previous bypass surgery  S/P PTCA (percutaneous transluminal coronary angioplasty) previous stenting is currently stable    Umbilical hernia without obstruction and without gangrene this been repaired    Dyslipidemia continue his meds plus his praulent  -     Lipid Panel; Future    Other orders  -     alirocumab (PRALUENT) 75 MG/ML SOAJ injection pen; Inject 1 mL into the skin every 14 days        [unfilled]      No follow-up provider specified.     Silver Brown MD  11/17/2022  6:07 PM

## 2023-01-16 ENCOUNTER — TELEPHONE (OUTPATIENT)
Dept: CARDIOLOGY CLINIC | Age: 78
End: 2023-01-16

## 2023-01-16 NOTE — TELEPHONE ENCOUNTER
Pt wife called to see if pt medicare and supplemental insurance cards were left here. If you find them please call.  Lost and found was checked already

## 2023-01-17 NOTE — TELEPHONE ENCOUNTER
I spoke with the pt and informed him we checked lost and found and didn't see them. I let him know that if we run across them we will give him a call. Pt verbalized understanding.

## 2023-03-13 ENCOUNTER — OFFICE VISIT (OUTPATIENT)
Dept: CARDIOLOGY CLINIC | Age: 78
End: 2023-03-13
Payer: MEDICARE

## 2023-03-13 VITALS
HEART RATE: 72 BPM | SYSTOLIC BLOOD PRESSURE: 138 MMHG | WEIGHT: 220.1 LBS | HEIGHT: 70 IN | BODY MASS INDEX: 31.51 KG/M2 | DIASTOLIC BLOOD PRESSURE: 80 MMHG

## 2023-03-13 DIAGNOSIS — E78.5 DYSLIPIDEMIA: ICD-10-CM

## 2023-03-13 DIAGNOSIS — I25.10 ATHEROSCLEROSIS OF NATIVE CORONARY ARTERY OF NATIVE HEART WITHOUT ANGINA PECTORIS: ICD-10-CM

## 2023-03-13 DIAGNOSIS — I10 PRIMARY HYPERTENSION: ICD-10-CM

## 2023-03-13 DIAGNOSIS — I25.10 CORONARY ARTERY DISEASE INVOLVING NATIVE CORONARY ARTERY OF NATIVE HEART WITHOUT ANGINA PECTORIS: Primary | ICD-10-CM

## 2023-03-13 DIAGNOSIS — I48.0 PAROXYSMAL ATRIAL FIBRILLATION (HCC): ICD-10-CM

## 2023-03-13 PROCEDURE — G8484 FLU IMMUNIZE NO ADMIN: HCPCS | Performed by: INTERNAL MEDICINE

## 2023-03-13 PROCEDURE — 3075F SYST BP GE 130 - 139MM HG: CPT | Performed by: INTERNAL MEDICINE

## 2023-03-13 PROCEDURE — 3079F DIAST BP 80-89 MM HG: CPT | Performed by: INTERNAL MEDICINE

## 2023-03-13 PROCEDURE — 1036F TOBACCO NON-USER: CPT | Performed by: INTERNAL MEDICINE

## 2023-03-13 PROCEDURE — G8417 CALC BMI ABV UP PARAM F/U: HCPCS | Performed by: INTERNAL MEDICINE

## 2023-03-13 PROCEDURE — 3017F COLORECTAL CA SCREEN DOC REV: CPT | Performed by: INTERNAL MEDICINE

## 2023-03-13 PROCEDURE — G8427 DOCREV CUR MEDS BY ELIG CLIN: HCPCS | Performed by: INTERNAL MEDICINE

## 2023-03-13 PROCEDURE — 99214 OFFICE O/P EST MOD 30 MIN: CPT | Performed by: INTERNAL MEDICINE

## 2023-03-13 PROCEDURE — 1123F ACP DISCUSS/DSCN MKR DOCD: CPT | Performed by: INTERNAL MEDICINE

## 2023-03-13 ASSESSMENT — ENCOUNTER SYMPTOMS
ABDOMINAL PAIN: 0
PHOTOPHOBIA: 0
ALLERGIC/IMMUNOLOGIC NEGATIVE: 1
SHORTNESS OF BREATH: 0
GASTROINTESTINAL NEGATIVE: 1
CHEST TIGHTNESS: 0
EYE PAIN: 0
EYES NEGATIVE: 1
RESPIRATORY NEGATIVE: 1
BACK PAIN: 0

## 2023-03-13 NOTE — PROGRESS NOTES
[facility] CARDIOLOGY  [address], [address]  PHONE: [phone]      23    NAME:  Osorio Ge. : 1945  MRN: [id number]         SUBJECTIVE:   Osorio Dorsey is a 78 y.o. male seen for follow up of:      Chief Complaint   Patient presents with    Atrial Fibrillation        Cardiac Hx (Reviewed and summarized by me):  1) CAD   CABG  Dr. Ck Peña [facility] - LIMA to LAD, SVG to Circ, SVG to RCA   Cath - 19 PCI prox SVG to RCA Carmine Cesar), w/patent LIMA to LAD, occluded SVG to Circ, Stents in the circ   NM stress test 2021 - normal perfusion  2) HTN  3) HLD -    22 - HDL 55, LDL 96, Trig 255  4) Paroxysmal Afib - failed blood thinners      HPI:  Formerly followed by Dr. Nerissa Miller. In  he underwent coronary artery bypass grafting with anatomy listed above. His most recent heart cath was done in 2019 with a PCI to his vein graft to the right coronary. At some point his OM graft failed and he had percutaneous intervention to the circumflex as well. Postoperatively he had paroxysmal atrial fibrillation I do not see any evidence that he has had A-fib since then. He states he has been on blood thinners but cannot remember when cannot remember why they were stopped. He does not take aspirin because he says it makes him bleed too much. He is on Praluent for cholesterol management his last lipid panel is listed above. Past Medical History, Past Surgical History, Family history, Social History, and Medications were all reviewed with the patient today and updated as necessary.        Current Outpatient Medications:     alirocumab (PRALUENT) 75 MG/ML SOAJ injection pen, Inject 1 mL into the skin every 14 days, Disp: 2.24 mL, Rfl: 5    aspirin 81 MG EC tablet, Take 81 mg by mouth in the morning., Disp: , Rfl:     HAWTHORN BERRY PO, Take by mouth, Disp: , Rfl:     ascorbic acid (VITAMIN C) 500 MG tablet, Take 1,000 mg by mouth, Disp: , Rfl: Coenzyme Q10 10 MG CAPS, Take by mouth, Disp: , Rfl:     cyanocobalamin 100 MCG tablet, Take 400 mcg by mouth daily, Disp: , Rfl:     LORazepam (ATIVAN) 1 MG tablet, Take 1 mg by mouth 2 times daily as needed. , Disp: , Rfl:     montelukast (SINGULAIR) 10 MG tablet, TAKE 1 TABLET BY MOUTH EVERY DAY, Disp: , Rfl:     PARoxetine (PAXIL) 20 MG tablet, Take 30 mg by mouth every evening, Disp: , Rfl:     potassium chloride (KLOR-CON M) 20 MEQ extended release tablet, Take 20 mEq by mouth daily as needed, Disp: , Rfl:     thiamine 100 MG tablet, Take 100 mg by mouth daily, Disp: , Rfl:     vitamin E 400 UNIT capsule, Take by mouth daily, Disp: , Rfl:   Allergies   Allergen Reactions    Indomethacin Shortness Of Breath     Severe dizziness    Atorvastatin Other (See Comments)    Rosuvastatin Other (See Comments)     CONFUSION     Past Medical History:   Diagnosis Date    Anemia     Anxiety     Asthma     childhood    BMI 33.0-33.9,adult     CAD (coronary artery disease)     CABG/Stents; denies MI; followed by [facility]    Calcification of abdominal aorta (HCC)     Cardiac arrhythmia     atrial fib    Chronic kidney disease     Dyslipidemia     Former smoker     GERD (gastroesophageal reflux disease)     Gout     HLD (hyperlipidemia)     Hypercholesterolemia     Hypertension     Insomnia     Liver disease     ?? growth on liver    Mild episode of recurrent major depressive disorder (HCC)     HAY (obstructive sleep apnea)     non-compliant with c-pap    Paroxysmal atrial fibrillation (HCC)     Poor historian     Prostatism     PUD (peptic ulcer disease)     HX    Stroke ([facility] [address]     DENIES (noted 9/13/22)     Past Surgical History:   Procedure Laterality Date    CABG, ARTERIAL, THREE  4/13/11    x3    CARDIAC CATHETERIZATION  09/06/2011    CORONARY ANGIOPLASTY WITH STENT PLACEMENT  09/16/2019    patient thinks 4 total stents; last in 2019=Successful angioplasty and stenting of the vein graft to the RCA.     INGUINAL HERNIA REPAIR Bilateral 9/15/2022    LAPAROSCOPIC BILATERAL INGUINAL HERNIA REPAIR WITH MESH performed by Charisse Carrel, MD at [address] [place] [address] N/A 2022    LAPAROTOMY EXPLORATORY, REVISION OF MESH, LYSIS OF ADHESIONS performed by Charisse Carrel, MD at [address] [facility]      right shoulder, left knee, and left foot    SINUS SURGERY      TESTICLE SURGERY      UMBILICAL HERNIA REPAIR N/A     HERNIA UMBILICAL REPAIR performed by Charisse Carrel, MD at [address] [facility]       Family History   Problem Relation Age of Onset    Alzheimer's Disease Mother     Stroke Father     Heart Disease Father      Social History     Tobacco Use    Smoking status: Former     Packs/day: 1.50     Types: Cigarettes     Quit date: 1970     Years since quittin.8    Smokeless tobacco: Former     Quit date: 2018    Tobacco comments:     Quit smoking: chews tobacco   Substance Use Topics    Alcohol use: Yes     Comment: occ       ROS:  Review of Systems   Constitutional: Negative. Negative for fever. HENT:  Negative for hearing loss, nosebleeds and tinnitus. Eyes: Negative. Negative for photophobia and pain. Respiratory: Negative. Negative for chest tightness and shortness of breath. Cardiovascular: Negative. Negative for chest pain, palpitations and leg swelling. Gastrointestinal: Negative. Negative for abdominal pain. Endocrine: Negative. Negative for cold intolerance and heat intolerance. Genitourinary: Negative. Negative for dysuria. Musculoskeletal: Negative. Negative for back pain and joint swelling. Skin: Negative. Negative for rash. Allergic/Immunologic: Negative. Negative for immunocompromised state. Neurological: Negative. Negative for dizziness, syncope and light-headedness. Hematological: Negative. Does not bruise/bleed easily. Psychiatric/Behavioral: Negative. Negative for suicidal ideas.           PHYSICAL EXAM:  Physical Exam  Constitutional:       General: He is not in acute distress. Appearance: He is not ill-appearing. HENT:      Head: Normocephalic and atraumatic. Nose: No congestion. Mouth/Throat:      Mouth: Mucous membranes are moist.   Eyes:      Extraocular Movements: Extraocular movements intact. Pupils: Pupils are equal, round, and reactive to light. Cardiovascular:      Rate and Rhythm: Normal rate and regular rhythm. Heart sounds: No murmur heard. No friction rub. No gallop. Pulmonary:      Effort: No respiratory distress. Breath sounds: No wheezing or rhonchi. Musculoskeletal:         General: No swelling. Cervical back: Normal range of motion. Right lower leg: No edema. Left lower leg: No edema. Skin:     General: Skin is warm and dry. Findings: No rash. Neurological:      General: No focal deficit present. Mental Status: He is oriented to person, place, and time. Psychiatric:         Mood and Affect: Mood normal.         Behavior: Behavior normal.         Judgment: Judgment normal.        /80   Pulse 72   Ht 5' 10\" (1.778 m)   Wt 220 lb 1.6 oz (99.8 kg)   BMI 31.58 kg/m²      Wt Readings from Last 10 Encounters:   03/13/23 220 lb 1.6 oz (99.8 kg)   11/17/22 223 lb (101.2 kg)   09/28/22 232 lb (105.2 kg)   09/18/22 232 lb (105.2 kg)   09/15/22 232 lb 8 oz (105.5 kg)   08/11/22 231 lb (104.8 kg)   05/12/22 229 lb (103.9 kg)   05/10/22 226 lb (102.5 kg)   05/02/22 231 lb (104.8 kg)   11/12/21 229 lb 12.8 oz (104.2 kg)           Medical problems and test results were reviewed with the patient today. Lab Results   Component Value Date/Time    BUN 9 09/30/2022 04:18 AM     No results found for: TANO, CREAPOC, CREA  Lab Results   Component Value Date/Time    K 3.6 09/30/2022 04:18 AM       Lab Results   Component Value Date/Time    CHOL 202 05/12/2022 04:16 PM    HDL 55 05/12/2022 04:16 PM       ASSESSMENT and PLAN    ICD-10-CM    1. Coronary artery disease involving native coronary artery of native heart without angina pectoris  I25.10       2. Primary hypertension  I10       3. Atherosclerosis of native coronary artery of native heart without angina pectoris  I25.10       4. Paroxysmal atrial fibrillation (HCC)  I48.0       5. Dyslipidemia  E78.5           IMPRESSION:  1) CAD -I have asked him to restart his aspirin 81 mg daily he seems asymptomatic. 2) HTN -not currently on any antihypertensive therapy we will continue to monitor his blood pressure of asked him to check it regularly at home and bring a blood pressure log next time he visits. 3) HLD -on maximum tolerated therapy with a PSK 9 inhibitor. 4) paroxysmal atrial fibrillation I feel he would be a candidate for the Watchman procedure however I do not think he has had any A-fib since he had bypass surgery. We will continue to monitor if he has a recurrence of A-fib then we will plan on referring him for consideration for watchman as he has been intolerant to blood thinners in the past.      ALL ORDERS THIS ENCOUNTER  No orders of the defined types were placed in this encounter. Follow up in 6 months. Thank you for allowing me to participate in this patient's care. Please call or contact me if there are any questions or concerns regarding the above.       Milad Phillips MD  03/13/23  3:45 PM

## 2023-05-17 ENCOUNTER — TELEPHONE (OUTPATIENT)
Age: 78
End: 2023-05-17

## 2023-05-17 NOTE — TELEPHONE ENCOUNTER
Called s/w pt. Pt states that when he saw Dr Vadim Pelayo (3/13/23) he mentioned starting a blood thinner. Pt states at that time he was not interested however, he has changed his mind and would like to take a blood thinner. Reports taking a blood thinner years ago and discontinue it due to bleeding when he would cut himself. States would bleed for about 20minutes before it would stop. Pt currently taking Aspirin 81mg QD  Also states that he had hernia surg 2-3 mths ago and graft has migrated causing abdominal bloating.   I advised pt to contact surgeon's office  Pt v/u

## 2023-05-17 NOTE — TELEPHONE ENCOUNTER
Pt states he can't remember what Dr. Cristiane Jones advised him to do so that he can try to prevent having a stroke. Pt also states he has been having some sharp chest pains over the left side of his chest but he thinks it might be gas. Pt would appreciate a call back.

## 2023-05-17 NOTE — TELEPHONE ENCOUNTER
Called and informed pt of Dr Ricco Solano response. Pt v/u.  F/U appt in Sept.  Offered pt sooner appt w/Dr Marifer Mckeon. Appt schedule 7/20/23 (ma).

## 2023-07-03 ENCOUNTER — OFFICE VISIT (OUTPATIENT)
Dept: FAMILY MEDICINE CLINIC | Facility: CLINIC | Age: 78
End: 2023-07-03
Payer: MEDICARE

## 2023-07-03 VITALS
WEIGHT: 222 LBS | HEART RATE: 73 BPM | BODY MASS INDEX: 31.78 KG/M2 | TEMPERATURE: 97.5 F | OXYGEN SATURATION: 96 % | DIASTOLIC BLOOD PRESSURE: 68 MMHG | HEIGHT: 70 IN | SYSTOLIC BLOOD PRESSURE: 132 MMHG

## 2023-07-03 DIAGNOSIS — I25.118 ATHEROSCLEROTIC HEART DISEASE OF NATIVE CORONARY ARTERY WITH OTHER FORMS OF ANGINA PECTORIS (HCC): ICD-10-CM

## 2023-07-03 DIAGNOSIS — N18.30 STAGE 3 CHRONIC KIDNEY DISEASE, UNSPECIFIED WHETHER STAGE 3A OR 3B CKD (HCC): Primary | ICD-10-CM

## 2023-07-03 DIAGNOSIS — L29.0 ANAL ITCHING: ICD-10-CM

## 2023-07-03 DIAGNOSIS — I70.0 CALCIFICATION OF ABDOMINAL AORTA (HCC): ICD-10-CM

## 2023-07-03 DIAGNOSIS — R19.09 LEFT GROIN MASS: ICD-10-CM

## 2023-07-03 DIAGNOSIS — Z12.11 SCREENING FOR COLON CANCER: ICD-10-CM

## 2023-07-03 DIAGNOSIS — F33.0 MAJOR DEPRESSIVE DISORDER, RECURRENT, MILD (HCC): ICD-10-CM

## 2023-07-03 PROCEDURE — G8427 DOCREV CUR MEDS BY ELIG CLIN: HCPCS | Performed by: FAMILY MEDICINE

## 2023-07-03 PROCEDURE — 1123F ACP DISCUSS/DSCN MKR DOCD: CPT | Performed by: FAMILY MEDICINE

## 2023-07-03 PROCEDURE — 3075F SYST BP GE 130 - 139MM HG: CPT | Performed by: FAMILY MEDICINE

## 2023-07-03 PROCEDURE — G8417 CALC BMI ABV UP PARAM F/U: HCPCS | Performed by: FAMILY MEDICINE

## 2023-07-03 PROCEDURE — 1036F TOBACCO NON-USER: CPT | Performed by: FAMILY MEDICINE

## 2023-07-03 PROCEDURE — 3078F DIAST BP <80 MM HG: CPT | Performed by: FAMILY MEDICINE

## 2023-07-03 PROCEDURE — 3017F COLORECTAL CA SCREEN DOC REV: CPT | Performed by: FAMILY MEDICINE

## 2023-07-03 PROCEDURE — 99214 OFFICE O/P EST MOD 30 MIN: CPT | Performed by: FAMILY MEDICINE

## 2023-07-03 RX ORDER — FLUCONAZOLE 150 MG/1
150 TABLET ORAL DAILY
Qty: 3 TABLET | Refills: 0 | Status: SHIPPED | OUTPATIENT
Start: 2023-07-03 | End: 2023-07-06

## 2023-07-03 SDOH — ECONOMIC STABILITY: INCOME INSECURITY: HOW HARD IS IT FOR YOU TO PAY FOR THE VERY BASICS LIKE FOOD, HOUSING, MEDICAL CARE, AND HEATING?: NOT HARD AT ALL

## 2023-07-03 SDOH — ECONOMIC STABILITY: HOUSING INSECURITY
IN THE LAST 12 MONTHS, WAS THERE A TIME WHEN YOU DID NOT HAVE A STEADY PLACE TO SLEEP OR SLEPT IN A SHELTER (INCLUDING NOW)?: NO

## 2023-07-03 SDOH — ECONOMIC STABILITY: FOOD INSECURITY: WITHIN THE PAST 12 MONTHS, YOU WORRIED THAT YOUR FOOD WOULD RUN OUT BEFORE YOU GOT MONEY TO BUY MORE.: NEVER TRUE

## 2023-07-03 SDOH — ECONOMIC STABILITY: FOOD INSECURITY: WITHIN THE PAST 12 MONTHS, THE FOOD YOU BOUGHT JUST DIDN'T LAST AND YOU DIDN'T HAVE MONEY TO GET MORE.: NEVER TRUE

## 2023-07-03 ASSESSMENT — ENCOUNTER SYMPTOMS
CONSTIPATION: 0
BLOOD IN STOOL: 0
DIARRHEA: 1
RECTAL PAIN: 0
ANAL BLEEDING: 0
EYES NEGATIVE: 1
RESPIRATORY NEGATIVE: 1
NAUSEA: 0
VOMITING: 0
ABDOMINAL PAIN: 0

## 2023-07-03 ASSESSMENT — PATIENT HEALTH QUESTIONNAIRE - PHQ9
6. FEELING BAD ABOUT YOURSELF - OR THAT YOU ARE A FAILURE OR HAVE LET YOURSELF OR YOUR FAMILY DOWN: 0
7. TROUBLE CONCENTRATING ON THINGS, SUCH AS READING THE NEWSPAPER OR WATCHING TELEVISION: 0
9. THOUGHTS THAT YOU WOULD BE BETTER OFF DEAD, OR OF HURTING YOURSELF: 0
1. LITTLE INTEREST OR PLEASURE IN DOING THINGS: 0
SUM OF ALL RESPONSES TO PHQ QUESTIONS 1-9: 0
5. POOR APPETITE OR OVEREATING: 0
2. FEELING DOWN, DEPRESSED OR HOPELESS: 0
SUM OF ALL RESPONSES TO PHQ9 QUESTIONS 1 & 2: 0
4. FEELING TIRED OR HAVING LITTLE ENERGY: 0
8. MOVING OR SPEAKING SO SLOWLY THAT OTHER PEOPLE COULD HAVE NOTICED. OR THE OPPOSITE, BEING SO FIGETY OR RESTLESS THAT YOU HAVE BEEN MOVING AROUND A LOT MORE THAN USUAL: 0
SUM OF ALL RESPONSES TO PHQ QUESTIONS 1-9: 0
SUM OF ALL RESPONSES TO PHQ QUESTIONS 1-9: 0
3. TROUBLE FALLING OR STAYING ASLEEP: 0
SUM OF ALL RESPONSES TO PHQ QUESTIONS 1-9: 0
10. IF YOU CHECKED OFF ANY PROBLEMS, HOW DIFFICULT HAVE THESE PROBLEMS MADE IT FOR YOU TO DO YOUR WORK, TAKE CARE OF THINGS AT HOME, OR GET ALONG WITH OTHER PEOPLE: 0

## 2023-07-03 NOTE — PROGRESS NOTES
HISTORY OF PRESENT ILLNESS    Magdaleno Schwab. is a 78 y.o. male. HPI  Chief Complaint   Patient presents with    Anal Itching     9 MONTHS      See above. Had hernia surgery about 10 months ago. Since then has had relatively constant rectal itching. Stools are like \"mush\" and more frequent than usual.  After surgery developed a nodule in left groin. Was told it was a hematoma but it has persisted. Some mild pain to pressure. Note pt is followed by cardiology for CAD. Supposed to follow up with nephrologist for history of CKD but has not seen then lately. Depression symptoms remain controlled on Paxil; no side effects. Current Outpatient Medications on File Prior to Visit   Medication Sig Dispense Refill    alirocumab (PRALUENT) 75 MG/ML SOAJ injection pen Inject 1 mL into the skin every 14 days 2.24 mL 5    aspirin 81 MG EC tablet Take 1 tablet by mouth daily      HAWTHORN BERRY PO Take by mouth      ascorbic acid (VITAMIN C) 500 MG tablet Take 2 tablets by mouth      Coenzyme Q10 10 MG CAPS Take by mouth      cyanocobalamin 100 MCG tablet Take 4 tablets by mouth daily      LORazepam (ATIVAN) 1 MG tablet Take 1 tablet by mouth 2 times daily as needed. montelukast (SINGULAIR) 10 MG tablet TAKE 1 TABLET BY MOUTH EVERY DAY      PARoxetine (PAXIL) 20 MG tablet Take 1.5 tablets by mouth every evening      potassium chloride (KLOR-CON M) 20 MEQ extended release tablet Take 1 tablet by mouth daily as needed      thiamine 100 MG tablet Take 1 tablet by mouth daily      vitamin E 400 UNIT capsule Take by mouth daily      [DISCONTINUED] furosemide (LASIX) 40 MG tablet Take 40 mg by mouth daily as needed (Patient not taking: Reported on 9/15/2022)      [DISCONTINUED] nitroGLYCERIN (NITROSTAT) 0.4 MG SL tablet Place 0.4 mg under the tongue (Patient not taking: Reported on 9/15/2022)      [DISCONTINUED] omeprazole (PRILOSEC) 20 MG delayed release capsule Take 20 mg by mouth in the morning.  (Patient not taking:

## 2023-07-11 ENCOUNTER — NURSE TRIAGE (OUTPATIENT)
Dept: OTHER | Facility: CLINIC | Age: 78
End: 2023-07-11

## 2023-07-11 NOTE — TELEPHONE ENCOUNTER
[place]    Received call from [address] at [facility] with Red Flag Complaint. Subjective: Caller states \"I had a hernia operation and every since then now I have an area near my left testicle that is two inches long and lately it has been burning quite a bit\"     Current Symptoms: intermittent left groin pain, strong smelling urine and stool    Hernia surgery in September 2022     Onset: 10 months ago;     Pain Severity: 6/10; burning; intermittent    Temperature: denies     Denies - vomiting / difficulty with urination / blood inurine    Recommended disposition: See in Office Today    Care advice provided, patient verbalizes understanding; denies any other questions or concerns; instructed to call back for any new or worsening symptoms. Patient/Caller agrees with recommended disposition; writer provided warm transfer to Dimmi at [facility] for appointment scheduling    Attention Provider: Thank you for allowing me to participate in the care of your patient. The patient was connected to triage in response to information provided to the ECC/PSC. Please do not respond through this encounter as the response is not directed to a shared pool.         Reason for Disposition   Pain comes and goes (intermittent) and present > 24 hours    Protocols used: Scrotal Pain-ADULT-[place]

## 2023-07-18 ENCOUNTER — CLINICAL DOCUMENTATION (OUTPATIENT)
Dept: SURGERY | Age: 78
End: 2023-07-18

## 2023-07-18 NOTE — PROGRESS NOTES
Referral received for routine colonoscopy- screening or surveillance only. Chart reviewed by me on July 18, 2023. Known from [facility]. Chart indicates last colo 2008- no evidence of having procedures (EGD/colo) recommended by GI 2018.   Stools abnormal since hernia surgery    Pt found to be acceptable candidate for direct scheduling if they are interested with the following special instructions (if any):

## 2023-07-20 ENCOUNTER — OFFICE VISIT (OUTPATIENT)
Age: 78
End: 2023-07-20
Payer: MEDICARE

## 2023-07-20 ENCOUNTER — TELEPHONE (OUTPATIENT)
Dept: FAMILY MEDICINE CLINIC | Facility: CLINIC | Age: 78
End: 2023-07-20

## 2023-07-20 VITALS
HEIGHT: 70 IN | BODY MASS INDEX: 31.07 KG/M2 | SYSTOLIC BLOOD PRESSURE: 130 MMHG | HEART RATE: 76 BPM | WEIGHT: 217 LBS | DIASTOLIC BLOOD PRESSURE: 80 MMHG

## 2023-07-20 DIAGNOSIS — R19.09 LEFT GROIN MASS: Primary | ICD-10-CM

## 2023-07-20 DIAGNOSIS — E78.5 DYSLIPIDEMIA: Primary | ICD-10-CM

## 2023-07-20 DIAGNOSIS — E78.5 DYSLIPIDEMIA: ICD-10-CM

## 2023-07-20 LAB
CHOLEST SERPL-MCNC: 202 MG/DL
HDLC SERPL-MCNC: 57 MG/DL (ref 40–60)
HDLC SERPL: 3.5
LDLC SERPL CALC-MCNC: 106.2 MG/DL
TRIGL SERPL-MCNC: 194 MG/DL (ref 35–150)
VLDLC SERPL CALC-MCNC: 38.8 MG/DL (ref 6–23)

## 2023-07-20 PROCEDURE — 3079F DIAST BP 80-89 MM HG: CPT | Performed by: INTERNAL MEDICINE

## 2023-07-20 PROCEDURE — 99214 OFFICE O/P EST MOD 30 MIN: CPT | Performed by: INTERNAL MEDICINE

## 2023-07-20 PROCEDURE — 1036F TOBACCO NON-USER: CPT | Performed by: INTERNAL MEDICINE

## 2023-07-20 PROCEDURE — 3075F SYST BP GE 130 - 139MM HG: CPT | Performed by: INTERNAL MEDICINE

## 2023-07-20 PROCEDURE — 3017F COLORECTAL CA SCREEN DOC REV: CPT | Performed by: INTERNAL MEDICINE

## 2023-07-20 PROCEDURE — 1123F ACP DISCUSS/DSCN MKR DOCD: CPT | Performed by: INTERNAL MEDICINE

## 2023-07-20 PROCEDURE — G8417 CALC BMI ABV UP PARAM F/U: HCPCS | Performed by: INTERNAL MEDICINE

## 2023-07-20 PROCEDURE — G8427 DOCREV CUR MEDS BY ELIG CLIN: HCPCS | Performed by: INTERNAL MEDICINE

## 2023-07-20 RX ORDER — POTASSIUM CHLORIDE 20 MEQ/1
20 TABLET, EXTENDED RELEASE ORAL DAILY PRN
Qty: 90 TABLET | Refills: 3 | Status: SHIPPED | OUTPATIENT
Start: 2023-07-20

## 2023-07-20 RX ORDER — FUROSEMIDE 40 MG/1
40 TABLET ORAL DAILY PRN
Qty: 90 TABLET | Refills: 3 | Status: SHIPPED | OUTPATIENT
Start: 2023-07-20

## 2023-07-20 RX ORDER — NITROGLYCERIN 0.4 MG/1
0.4 TABLET SUBLINGUAL EVERY 5 MIN PRN
Qty: 25 TABLET | Refills: 3 | Status: SHIPPED | OUTPATIENT
Start: 2023-07-20

## 2023-07-20 ASSESSMENT — ENCOUNTER SYMPTOMS
ALLERGIC/IMMUNOLOGIC NEGATIVE: 1
PHOTOPHOBIA: 0
CHEST TIGHTNESS: 0
RESPIRATORY NEGATIVE: 1
ABDOMINAL PAIN: 0
GASTROINTESTINAL NEGATIVE: 1
BACK PAIN: 0
EYE PAIN: 0
SHORTNESS OF BREATH: 0
EYES NEGATIVE: 1

## 2023-07-20 NOTE — PROGRESS NOTES
[facility] CARDIOLOGY  [id number] [place] [address], [address] [place] [address]  PHONE: [phone]      23    NAME:  Sourav Melton. : 1945  MRN: [id number]         SUBJECTIVE:   Sourav Melton. is a 78 y.o. male seen for follow up of:      Chief Complaint   Patient presents with    Other     Discuss blood thinner    Hypertension    Coronary Artery Disease    Atrial Fibrillation        Cardiac Hx (Reviewed and summarized by me):  1) CAD              CABG  Dr. Dakotah Mrerill [facility] - LIMA to LAD, SVG to Circ, SVG to RCA              Cath - 19 PCI prox SVG to RCA Maria C Sensor), w/patent LIMA to LAD, occluded SVG to Circ, Stents in the circ              NM stress test 2021 - normal perfusion  2) HTN  3) HLD -               22 - HDL 55, LDL 96, Trig 255  4) Paroxysmal Afib - failed blood thinners      HPI:  Has been unfaithful about taking his Praluent. He read online that cholesterol management is not necessary and has sought the care of a functional medicine specialist.  He has questions today about whether a calcium score would be appropriate to help evaluate his risk for coronary artery disease. Past Medical History, Past Surgical History, Family history, Social History, and Medications were all reviewed with the patient today and updated as necessary. Current Outpatient Medications:     pramoxine-hydrocortisone (ANALPRAM HC) 1-1 % cream, Apply topically 3 times daily. , Disp: 45 g, Rfl: 1    alirocumab (PRALUENT) 75 MG/ML SOAJ injection pen, Inject 1 mL into the skin every 14 days, Disp: 2.24 mL, Rfl: 5    aspirin 81 MG EC tablet, Take 1 tablet by mouth daily, Disp: , Rfl:     HAWTHORN BERRY PO, Take by mouth, Disp: , Rfl:     ascorbic acid (VITAMIN C) 500 MG tablet, Take 2 tablets by mouth, Disp: , Rfl:     cyanocobalamin 100 MCG tablet, Take 4 tablets by mouth daily, Disp: , Rfl:     LORazepam (ATIVAN) 1 MG tablet, Take 1 tablet by mouth 2 times daily as needed. , Disp: ,

## 2023-07-20 NOTE — TELEPHONE ENCOUNTER
----- Message from Hernandez Bradley sent at 7/20/2023 11:05 AM EDT -----  Subject: Message to Provider    QUESTIONS  Information for Provider? Patient wants to know if Dr. Gomez Kaur has set up   the appointment for a scan to check and see if the package from the hernia   has moved. He has a hard spot by his left testicle that has a burning   sensation. He would like to get the scan before 8/1/23. Please call and   let him know when and where he is to go for the scan.  ---------------------------------------------------------------------------  --------------  Parviz POPE  [phone]; OK to leave message on voicemail  ---------------------------------------------------------------------------  --------------  SCRIPT ANSWERS  Relationship to Patient?  Self

## 2023-07-21 ENCOUNTER — TELEPHONE (OUTPATIENT)
Dept: FAMILY MEDICINE CLINIC | Facility: CLINIC | Age: 78
End: 2023-07-21

## 2023-07-21 DIAGNOSIS — R10.13 EPIGASTRIC PAIN: Primary | ICD-10-CM

## 2023-07-21 DIAGNOSIS — K58.9 IRRITABLE BOWEL SYNDROME, UNSPECIFIED TYPE: ICD-10-CM

## 2023-07-21 DIAGNOSIS — R14.0 BLOATING: ICD-10-CM

## 2023-07-21 NOTE — TELEPHONE ENCOUNTER
Pt wanted to add an Abd ultrasound for pain and bloating. Pt states he doesn't care about the price he would pay for it if needed.

## 2023-07-25 ENCOUNTER — HOSPITAL ENCOUNTER (OUTPATIENT)
Dept: ULTRASOUND IMAGING | Age: 78
Discharge: HOME | End: 2023-07-28
Attending: FAMILY MEDICINE

## 2023-07-25 DIAGNOSIS — R14.0 BLOATING: ICD-10-CM

## 2023-07-25 DIAGNOSIS — R10.13 EPIGASTRIC PAIN: ICD-10-CM

## 2023-07-25 DIAGNOSIS — K58.9 IRRITABLE BOWEL SYNDROME, UNSPECIFIED TYPE: ICD-10-CM

## 2023-07-25 DIAGNOSIS — R19.09 LEFT GROIN MASS: ICD-10-CM

## 2023-07-26 ENCOUNTER — TELEPHONE (OUTPATIENT)
Dept: FAMILY MEDICINE CLINIC | Facility: CLINIC | Age: 78
End: 2023-07-26

## 2023-07-26 ENCOUNTER — TELEPHONE (OUTPATIENT)
Age: 78
End: 2023-07-26

## 2023-07-26 DIAGNOSIS — R14.0 BLOATING: ICD-10-CM

## 2023-07-26 DIAGNOSIS — R10.13 EPIGASTRIC PAIN: ICD-10-CM

## 2023-07-26 DIAGNOSIS — R19.09 LEFT GROIN MASS: Primary | ICD-10-CM

## 2023-07-26 DIAGNOSIS — K58.9 IRRITABLE BOWEL SYNDROME, UNSPECIFIED TYPE: ICD-10-CM

## 2023-07-26 RX ORDER — ALIROCUMAB 75 MG/ML
75 INJECTION, SOLUTION SUBCUTANEOUS
Qty: 2.24 ML | Refills: 5 | Status: SHIPPED | OUTPATIENT
Start: 2023-07-26

## 2023-07-26 NOTE — TELEPHONE ENCOUNTER
alirocumab (PRALUENT) 75 MG/ML SOAJ injection pen [[id number]]     Order Details  Dose: 75 mg Route: SubCUTAneous Frequency: EVERY 14 DAYS   Dispense Quantity: 2.24 mL Refills: 5          Sig: Inject 1 mL into the skin every 14 days           Pharmacy    [facility]pharm[facility][id number] [facility] [phone] - F [phone]   [address] [place] [address]., [facility] [address] [address]   Phone:  [phone]  Fax:  [phone]

## 2023-07-26 NOTE — TELEPHONE ENCOUNTER
Pt called to inform provider that ultrasound was performed yesterday. States he ahas also been experiencing a high level of pain in groin. Pt requested a call to discuss results.

## 2023-07-26 NOTE — TELEPHONE ENCOUNTER
Requested Prescriptions     Pending Prescriptions Disp Refills    alirocumab (PRALUENT) 75 MG/ML SOAJ injection pen 2.24 mL 5     Sig: Inject 1 mL into the skin every 14 days

## 2023-07-26 NOTE — TELEPHONE ENCOUNTER
----- Message from Zac Snyder MD sent at 7/25/2023 11:00 AM EDT -----  Radiologist suggested lesion is a fat filled hernia but recommended CT to better define the nature of the lesion.  ----- Message -----  From: [facility] Incoming Orders Results To Radiant  Sent: 7/25/2023  10:28 AM EDT  To: Zac Snyder MD

## 2023-07-27 NOTE — TELEPHONE ENCOUNTER
Spoke to pt's spouse and informed her regarding Dr de los santos results. Spouse verbalized that the pt was previously reluctant on taking med, but will do so now.  Told pt spouse that if med needs a PA, to call back

## 2023-07-28 ENCOUNTER — HOSPITAL ENCOUNTER (OUTPATIENT)
Dept: CT IMAGING | Age: 78
Discharge: HOME | End: 2023-07-31
Attending: FAMILY MEDICINE

## 2023-07-28 DIAGNOSIS — R19.09 LEFT GROIN MASS: ICD-10-CM

## 2023-07-31 ENCOUNTER — TELEPHONE (OUTPATIENT)
Dept: FAMILY MEDICINE CLINIC | Facility: CLINIC | Age: 78
End: 2023-07-31

## 2023-07-31 DIAGNOSIS — R10.30 INGUINAL PAIN, UNSPECIFIED LATERALITY: Primary | ICD-10-CM

## 2023-07-31 NOTE — TELEPHONE ENCOUNTER
Patient states he's in extreme pain & would like to talk to Dr. Jennifer Patel. Patient is leaving for [place] tomorrow and really needs to know the results of his CT scan. Please call his at [phone].

## 2023-08-03 RX ORDER — TRAMADOL HYDROCHLORIDE 50 MG/1
50 TABLET ORAL
Qty: 30 TABLET | Refills: 0 | Status: SHIPPED | OUTPATIENT
Start: 2023-08-03 | End: 2023-08-11

## 2023-08-11 ENCOUNTER — NURSE TRIAGE (OUTPATIENT)
Dept: OTHER | Facility: CLINIC | Age: 78
End: 2023-08-11

## 2023-08-11 ENCOUNTER — TELEPHONE (OUTPATIENT)
Dept: FAMILY MEDICINE CLINIC | Facility: CLINIC | Age: 78
End: 2023-08-11

## 2023-08-11 DIAGNOSIS — N50.812 TESTICULAR PAIN, LEFT: Primary | ICD-10-CM

## 2023-08-11 NOTE — TELEPHONE ENCOUNTER
Patient called to give Symone some information from the radiology department. Please call him at [phone].

## 2023-08-11 NOTE — TELEPHONE ENCOUNTER
[place]: [place]    Received call from [facility] at [facility] with [employer]. Subjective: Caller states \"IT could be from a migrating mesh from a hernia. Back in september I had a hernia operation. The mesh that is in there has migrated down to my lower left testicle. It is creating all kind of pain and problems. \"     Current Symptoms: Testicular pain     Onset: 2 weeks ago; worsening    Associated Symptoms: NA    Pain Severity: 10+/10; ; severe when lying down, getting up, right now 8/10    Temperature: denies     What has been tried: Tramadol     LMP: NA Pregnant: NA    Recommended disposition: Go to ED Now    Care advice provided, patient verbalizes understanding; denies any other questions or concerns; instructed to call back for any new or worsening symptoms. Patient/caller agrees to proceed to nearest Emergency Department    Attention Provider: Thank you for allowing me to participate in the care of your patient. The patient was connected to triage in response to information provided to the ECC/PSC. Please do not respond through this encounter as the response is not directed to a shared pool.         Reason for Disposition   Hernia is painful or tender to touch   SEVERE pain (e.g., excruciating)    Protocols used: Scrotum Pain-ADULT-AH, Hernia-ADULT-AH

## 2023-08-11 NOTE — TELEPHONE ENCOUNTER
----- Message from [facility] sent at 8/11/2023  9:50 AM EDT -----  Subject: Referral Request    Reason for referral request? Patient would like to be referred to a   urologist. Please advise. Provider patient wants to be referred to(if known):     Provider Phone Number(if known):     Additional Information for Provider?   ---------------------------------------------------------------------------  --------------  [address]    [phone]; OK to leave message on voicemail  ---------------------------------------------------------------------------  --------------

## 2023-08-16 ENCOUNTER — OFFICE VISIT (OUTPATIENT)
Dept: GASTROENTEROLOGY | Age: 78
End: 2023-08-16
Payer: MEDICARE

## 2023-08-16 VITALS
DIASTOLIC BLOOD PRESSURE: 78 MMHG | OXYGEN SATURATION: 97 % | HEIGHT: 70 IN | TEMPERATURE: 97.3 F | HEART RATE: 73 BPM | BODY MASS INDEX: 32.07 KG/M2 | WEIGHT: 224 LBS | SYSTOLIC BLOOD PRESSURE: 154 MMHG | RESPIRATION RATE: 16 BRPM

## 2023-08-16 DIAGNOSIS — R14.3 FLATULENCE, ERUCTATION AND GAS PAIN: Primary | ICD-10-CM

## 2023-08-16 DIAGNOSIS — R19.4 BOWEL HABIT CHANGES: ICD-10-CM

## 2023-08-16 DIAGNOSIS — R14.1 FLATULENCE, ERUCTATION AND GAS PAIN: Primary | ICD-10-CM

## 2023-08-16 DIAGNOSIS — R14.2 FLATULENCE, ERUCTATION AND GAS PAIN: Primary | ICD-10-CM

## 2023-08-16 PROCEDURE — 99204 OFFICE O/P NEW MOD 45 MIN: CPT | Performed by: PHYSICIAN ASSISTANT

## 2023-08-16 PROCEDURE — 3077F SYST BP >= 140 MM HG: CPT | Performed by: PHYSICIAN ASSISTANT

## 2023-08-16 PROCEDURE — 1123F ACP DISCUSS/DSCN MKR DOCD: CPT | Performed by: PHYSICIAN ASSISTANT

## 2023-08-16 PROCEDURE — 3078F DIAST BP <80 MM HG: CPT | Performed by: PHYSICIAN ASSISTANT

## 2023-08-16 RX ORDER — POLYETHYLENE GLYCOL 3350 17 G/17G
238 POWDER, FOR SOLUTION ORAL ONCE
Qty: 238 G | Refills: 0 | Status: SHIPPED | OUTPATIENT
Start: 2023-08-16 | End: 2023-08-16

## 2023-08-16 RX ORDER — SIMETHICONE 125 MG
125 TABLET,CHEWABLE ORAL 4 TIMES DAILY PRN
Qty: 120 TABLET | Refills: 0 | Status: SHIPPED | OUTPATIENT
Start: 2023-08-16

## 2023-08-16 RX ORDER — BISACODYL 5 MG/1
5 TABLET, DELAYED RELEASE ORAL SEE ADMIN INSTRUCTIONS
Qty: 4 TABLET | Refills: 0 | Status: SHIPPED | OUTPATIENT
Start: 2023-08-16

## 2023-08-16 NOTE — ASSESSMENT & PLAN NOTE
May be related to uncontrolled GERD. Counseled patient and provided written recommendations for diet and lifestyle modifications for GERD. Avoid tobacco, alcohol, NSAIDs. Can continue Gaviscon for relief. Add Simethicone. Avoid carbonated beverages. Schedule EGD and follow-up pathology for H.pylori and duodenal biopsy to r/o celiac.

## 2023-08-16 NOTE — ASSESSMENT & PLAN NOTE
He reports foul-smelling \"mushy\" stools for months after hernia repair in September. Since starting probiotic he's been mildly constipated. Recommend he increase dietary fiber intake and add Citrucel fiber supplement. Discussed IBS as well as low-FODMAP diet. Recommend he start diet journal. Schedule colonoscopy. Follow-up after procedure to discuss results. If WNL consider IBS medications.

## 2023-08-16 NOTE — PATIENT INSTRUCTIONS
For reflux:   Eat smaller and more frequent meals. Avoid lying down for 3 hours after eating. Elevate the head of the bed by 6 inches. Avoid wearing tight-fitting clothing. Stop smoking and avoid alcohol. Avoid NSAID medications (Aspirin, Excedrin, Aleve, Ibuprofen, Goody Powder, Toradol, Mobic, Voltaren, etc). Consider weight loss to get to a healthy weight, which is often critical to eliminating symptoms of reflux. Avoid foods and acid-containing beverages that can exacerbate the symptoms of reflux. This includes:     -Caffeine  -Chocolate  -Peppermint  -Alcohol (especially red wine)  -Carbonated beverages  -Citrus fruits  -Tomato-based products  -Vinegar  -Spicy and greasy foods    For constipation:  Take Miralax 1 cap daily for constipation and titrate up or down as needed until having regular daily bowel movements. Increase daily fiber intake to 20-30 grams daily either by dietary intake or an over-the-counter supplement such as Citrucel. Limit alcohol and fatty food intake. Drink at least 80 oz water daily or more as needed to maintain adequate hydration. Exercise regularly and consider weight loss if appropriate based on your current weight. Avoid straining or lingering on the toilet longer than necessary. Review your medication list and discuss with your PCP whether any of these medications may exacerbate constipation.

## 2023-08-17 ENCOUNTER — PREP FOR PROCEDURE (OUTPATIENT)
Dept: GASTROENTEROLOGY | Age: 78
End: 2023-08-17

## 2023-08-17 RX ORDER — SODIUM CHLORIDE 9 MG/ML
25 INJECTION, SOLUTION INTRAVENOUS PRN
Status: CANCELLED | OUTPATIENT
Start: 2023-08-17

## 2023-08-17 RX ORDER — SODIUM CHLORIDE 0.9 % (FLUSH) 0.9 %
5-40 SYRINGE (ML) INJECTION EVERY 12 HOURS SCHEDULED
Status: CANCELLED | OUTPATIENT
Start: 2023-08-17

## 2023-08-17 RX ORDER — SODIUM CHLORIDE 0.9 % (FLUSH) 0.9 %
5-40 SYRINGE (ML) INJECTION PRN
Status: CANCELLED | OUTPATIENT
Start: 2023-08-17

## 2023-09-20 ENCOUNTER — OFFICE VISIT (OUTPATIENT)
Dept: UROLOGY | Age: 78
End: 2023-09-20
Payer: MEDICARE

## 2023-09-20 DIAGNOSIS — N50.812 LEFT TESTICULAR PAIN: Primary | ICD-10-CM

## 2023-09-20 LAB
BILIRUBIN, URINE, POC: NEGATIVE
BLOOD URINE, POC: NEGATIVE
GLUCOSE URINE, POC: NEGATIVE
KETONES, URINE, POC: NEGATIVE
LEUKOCYTE ESTERASE, URINE, POC: NEGATIVE
NITRITE, URINE, POC: NEGATIVE
PH, URINE, POC: 5.5 (ref 4.6–8)
PROTEIN,URINE, POC: NEGATIVE
SPECIFIC GRAVITY, URINE, POC: 1.02 (ref 1–1.03)
URINALYSIS CLARITY, POC: NORMAL
URINALYSIS COLOR, POC: NORMAL
UROBILINOGEN, POC: NORMAL

## 2023-09-20 PROCEDURE — 1123F ACP DISCUSS/DSCN MKR DOCD: CPT | Performed by: UROLOGY

## 2023-09-20 PROCEDURE — G8417 CALC BMI ABV UP PARAM F/U: HCPCS | Performed by: UROLOGY

## 2023-09-20 PROCEDURE — 99214 OFFICE O/P EST MOD 30 MIN: CPT | Performed by: UROLOGY

## 2023-09-20 PROCEDURE — 81003 URINALYSIS AUTO W/O SCOPE: CPT | Performed by: UROLOGY

## 2023-09-20 PROCEDURE — G8427 DOCREV CUR MEDS BY ELIG CLIN: HCPCS | Performed by: UROLOGY

## 2023-09-20 PROCEDURE — 1036F TOBACCO NON-USER: CPT | Performed by: UROLOGY

## 2023-09-20 RX ORDER — GABAPENTIN 300 MG/1
300 CAPSULE ORAL 2 TIMES DAILY
Qty: 180 CAPSULE | Refills: 1 | Status: SHIPPED | OUTPATIENT
Start: 2023-09-20 | End: 2024-03-18

## 2023-09-20 ASSESSMENT — ENCOUNTER SYMPTOMS: NAUSEA: 0

## 2023-09-20 NOTE — PROGRESS NOTES
[facility]  [facility]    [address] [facility], [address]  [place] [address] [address] : 1945    Chief Complaint   Patient presents with    Follow-up     Left testicular pain          HPI     Tracey Falling. is a 70 y.o. male with a PMH of L testicle pain / swelling, bilateral inguinal hernias and ED who returns for follow up. Seen 2022 and was referred to general surgery for bilateral inguinal hernia repair. He had hernia repair 2022 and had post-op course complicated by SBO and \"problems with L sided mesh. \"      Today, he main concern is persistent L groin swelling and pain. He has not had follow up scrotal US since his hernia repair but did have CT pelvis 2023 which showed NO recurrent hernia. He also had US of abdomen 2023 which showed no recurrent hernia. He previously complained of ED. However, today, he states that he has no bothersome ED and does not want to discuss treatment options. Of note, he has had surgery at age 13 on his R testicle after testicle trauma as a child and states that the R testicle has always been larger to palpation as a result. Denies urgency, frequency, retention, urinary incontinence, hematuria, Not on bladder / prostate meds. Has not had recent PSA/REBECCA. Imaging personally reviewed by me today.       Lab Results   Component Value Date    PSA 1.8 2022     Past Medical History:   Diagnosis Date    Anemia     Anxiety     Asthma     childhood    BMI 33.0-33.9,adult     CAD (coronary artery disease)     CABG/Stents; denies MI; followed by [facility]    Calcification of abdominal aorta (HCC)     Cardiac arrhythmia     atrial fib    Chronic kidney disease     Dyslipidemia     Former smoker     GERD (gastroesophageal reflux disease)     Gout     HLD (hyperlipidemia)     Hypercholesterolemia     Hypertension     Insomnia     Liver disease     ?? growth on liver    Mild episode of recurrent

## 2023-09-21 NOTE — PERIOP NOTE
Patient verified name, , and procedure. Type: 1a; abbreviated assessment per anesthesia guidelines    Labs per anesthesia: none    Instructed pt that they will be notified the day before their procedure by the GI Lab for time of arrival if their procedure is [facility] and Pre-op for [facility] cases. Arrival times should be called by 5 pm. If no phone is received the patient should contact their respective hospital. The GI lab telephone number is [phone] and ES Pre-op is [phone]. Follow diet and prep instructions per office including NPO status. If patient has NOT received instructions from office patient is advised to call surgeon office, verbalizes understanding. Bath or shower the night before and the am of surgery with non-moisturizing soap. No lotions, oils, powders, cologne on skin. No make up, eye make up or jewelry. Wear loose fitting comfortable, clean clothing. Must have adult present in building the entire time . Medications for the day of procedure Gabapentin, patient to hold ASA per Dr. Louis Body instructions. The following discharge instructions reviewed with patient: medication given during procedure may cause drowsiness for several hours, therefore, do not drive or operate machinery for remainder of the day. You may not drink alcohol on the day of your procedure, please resume regular diet and activity unless otherwise directed. You may experience abdominal distention for several hours that is relieved by the passage of gas. Contact your physician if you have any of the following: fever or chills, severe abdominal pain or excessive amount of bleeding or a large amount when having a bowel movement.  Occasional specks of blood with bowel movement would not be unusual.

## 2023-09-22 ASSESSMENT — ENCOUNTER SYMPTOMS
VOMITING: 0
BACK PAIN: 0
EYE PAIN: 0
HEARTBURN: 0
ABDOMINAL PAIN: 0
COUGH: 0
CONSTIPATION: 0
EYE DISCHARGE: 0
DIARRHEA: 0
WHEEZING: 0
INDIGESTION: 0
SKIN LESIONS: 0
BLOOD IN STOOL: 0
SHORTNESS OF BREATH: 0

## 2023-09-25 RX ORDER — IPRATROPIUM BROMIDE AND ALBUTEROL SULFATE 2.5; .5 MG/3ML; MG/3ML
1 SOLUTION RESPIRATORY (INHALATION)
Status: CANCELLED | OUTPATIENT
Start: 2023-09-25 | End: 2023-09-26

## 2023-09-26 ENCOUNTER — HOSPITAL ENCOUNTER (OUTPATIENT)
Age: 78
Setting detail: OUTPATIENT SURGERY
Discharge: HOME | End: 2023-09-26
Attending: INTERNAL MEDICINE | Admitting: INTERNAL MEDICINE
Payer: MEDICARE

## 2023-09-26 ENCOUNTER — ANESTHESIA (OUTPATIENT)
Dept: ENDOSCOPY | Age: 78
End: 2023-09-26
Payer: MEDICARE

## 2023-09-26 ENCOUNTER — ANESTHESIA EVENT (OUTPATIENT)
Dept: ENDOSCOPY | Age: 78
End: 2023-09-26
Payer: MEDICARE

## 2023-09-26 VITALS
TEMPERATURE: 98 F | HEART RATE: 63 BPM | WEIGHT: 216.2 LBS | OXYGEN SATURATION: 95 % | DIASTOLIC BLOOD PRESSURE: 60 MMHG | BODY MASS INDEX: 30.95 KG/M2 | RESPIRATION RATE: 28 BRPM | SYSTOLIC BLOOD PRESSURE: 111 MMHG | HEIGHT: 70 IN

## 2023-09-26 PROCEDURE — 2580000003 HC RX 258: Performed by: ANESTHESIOLOGY

## 2023-09-26 PROCEDURE — 3609012400 HC EGD TRANSORAL BIOPSY SINGLE/MULTIPLE: Performed by: INTERNAL MEDICINE

## 2023-09-26 PROCEDURE — 2500000003 HC RX 250 WO HCPCS: Performed by: NURSE ANESTHETIST, CERTIFIED REGISTERED

## 2023-09-26 PROCEDURE — 88305 TISSUE EXAM BY PATHOLOGIST: CPT

## 2023-09-26 PROCEDURE — 88312 SPECIAL STAINS GROUP 1: CPT

## 2023-09-26 PROCEDURE — 3700000001 HC ADD 15 MINUTES (ANESTHESIA): Performed by: INTERNAL MEDICINE

## 2023-09-26 PROCEDURE — 7100000010 HC PHASE II RECOVERY - FIRST 15 MIN: Performed by: INTERNAL MEDICINE

## 2023-09-26 PROCEDURE — 6360000002 HC RX W HCPCS: Performed by: NURSE ANESTHETIST, CERTIFIED REGISTERED

## 2023-09-26 PROCEDURE — 2709999900 HC NON-CHARGEABLE SUPPLY: Performed by: INTERNAL MEDICINE

## 2023-09-26 PROCEDURE — 3700000000 HC ANESTHESIA ATTENDED CARE: Performed by: INTERNAL MEDICINE

## 2023-09-26 PROCEDURE — 3609027000 HC COLONOSCOPY: Performed by: INTERNAL MEDICINE

## 2023-09-26 PROCEDURE — 7100000011 HC PHASE II RECOVERY - ADDTL 15 MIN: Performed by: INTERNAL MEDICINE

## 2023-09-26 RX ORDER — PROPOFOL 10 MG/ML
INJECTION, EMULSION INTRAVENOUS PRN
Status: DISCONTINUED | OUTPATIENT
Start: 2023-09-26 | End: 2023-09-26 | Stop reason: SDUPTHER

## 2023-09-26 RX ORDER — SODIUM CHLORIDE, SODIUM LACTATE, POTASSIUM CHLORIDE, CALCIUM CHLORIDE 600; 310; 30; 20 MG/100ML; MG/100ML; MG/100ML; MG/100ML
INJECTION, SOLUTION INTRAVENOUS CONTINUOUS
Status: DISCONTINUED | OUTPATIENT
Start: 2023-09-26 | End: 2023-09-26 | Stop reason: HOSPADM

## 2023-09-26 RX ORDER — SODIUM CHLORIDE 9 MG/ML
25 INJECTION, SOLUTION INTRAVENOUS PRN
Status: DISCONTINUED | OUTPATIENT
Start: 2023-09-26 | End: 2023-09-26 | Stop reason: HOSPADM

## 2023-09-26 RX ORDER — SODIUM CHLORIDE 0.9 % (FLUSH) 0.9 %
5-40 SYRINGE (ML) INJECTION EVERY 12 HOURS SCHEDULED
Status: DISCONTINUED | OUTPATIENT
Start: 2023-09-26 | End: 2023-09-26 | Stop reason: HOSPADM

## 2023-09-26 RX ORDER — SODIUM CHLORIDE 0.9 % (FLUSH) 0.9 %
5-40 SYRINGE (ML) INJECTION PRN
Status: DISCONTINUED | OUTPATIENT
Start: 2023-09-26 | End: 2023-09-26 | Stop reason: HOSPADM

## 2023-09-26 RX ORDER — LIDOCAINE HYDROCHLORIDE 20 MG/ML
INJECTION, SOLUTION EPIDURAL; INFILTRATION; INTRACAUDAL; PERINEURAL PRN
Status: DISCONTINUED | OUTPATIENT
Start: 2023-09-26 | End: 2023-09-26 | Stop reason: SDUPTHER

## 2023-09-26 RX ORDER — SODIUM CHLORIDE 9 MG/ML
INJECTION, SOLUTION INTRAVENOUS PRN
Status: DISCONTINUED | OUTPATIENT
Start: 2023-09-26 | End: 2023-09-26 | Stop reason: HOSPADM

## 2023-09-26 RX ORDER — LIDOCAINE HYDROCHLORIDE 10 MG/ML
1 INJECTION, SOLUTION INFILTRATION; PERINEURAL
Status: DISCONTINUED | OUTPATIENT
Start: 2023-09-26 | End: 2023-09-26 | Stop reason: HOSPADM

## 2023-09-26 RX ADMIN — SODIUM CHLORIDE, SODIUM LACTATE, POTASSIUM CHLORIDE, AND CALCIUM CHLORIDE: 600; 310; 30; 20 INJECTION, SOLUTION INTRAVENOUS at 07:33

## 2023-09-26 RX ADMIN — PROPOFOL 100 MG: 10 INJECTION, EMULSION INTRAVENOUS at 07:36

## 2023-09-26 RX ADMIN — PROPOFOL 200 MCG/KG/MIN: 10 INJECTION, EMULSION INTRAVENOUS at 07:37

## 2023-09-26 RX ADMIN — LIDOCAINE HYDROCHLORIDE 100 MG: 20 INJECTION, SOLUTION EPIDURAL; INFILTRATION; INTRACAUDAL; PERINEURAL at 07:36

## 2023-09-26 ASSESSMENT — ENCOUNTER SYMPTOMS
DYSPNEA ACTIVITY LEVEL: NO INTERVAL CHANGE
ABDOMINAL DISTENTION: 1
ABDOMINAL PAIN: 1
NAUSEA: 1
CONSTIPATION: 1

## 2023-09-26 ASSESSMENT — LIFESTYLE VARIABLES: SMOKING_STATUS: 1

## 2023-09-26 NOTE — PROCEDURES
Operative Report    Patient: Lorna Clinton. MRN: [id number]      YOB: 1945  Age: 70 y.o. Sex: male            Indications: Bowel habit [email or web address]     Preoperative Evaluation: The patient was evaluated prior to the procedure in the GI lab admission area, the patient ASA was recorded . Consent was obtained from the patient with the risk of perforation bleeding and aspiration. Anesthesia: AAKASH-per anesthesia    Complications: None; patient tolerated the procedure well. EBL -insignificant      Procedure: The patient was sedated in the left lateral decubitus position. Scope was advanced from the rectum to the cecum. Evaluation was performed to the cecum twice. The scope was withdrawn to the rectum, retroflexed view was performed. The rectal exam was normal.  Preparation was good Vilonia score of 3/3/2:8 . Findings:   Exam to the cecum. Tortuous colon in its most likely from adhesions and recent abdominal surgery. Scattered left-sided diverticulosis. No sign of colon polyps noted ;internal/external hemorrhoid noted on digital exam and retroflexed view      Postoperative Diagnosis: Diverticulosis    79455 Colonoscopy, Flexible; diagnostic       Recommendations:   - Follow up with me.       Signed By:  Sloan Miles MD     September 26, 2023

## 2023-09-26 NOTE — PROCEDURES
Endoscopy Note          Operative Report    Patient: Tracy Roberts MRN: [id number]      YOB: 1945  Age: 70 y.o. Sex: male            Indications:   Chronic reflux disease    Preoperative Evaluation: The patient was evaluated prior to the procedure in the GI lab admission area, the patient ASA was recorded . Consent was obtained from the patient with the risk of perforation bleeding and aspiration. Anesthesia: AAKASH-per anesthesia    Complication: None; patient tolerated the procedure well. Estimated blood loss: insignificant    Procedure: The patient was sedated into the left lateral decubitus position. Scope was advanced from the mouth to the third portion of duodenum. Scope was withdrawn to the stomach and retroflexed view was performed. Esophageal examination was performed. Findings: Normal upper and mid esophageal mucosa mild distal grade 1 esophagitis. Superficial antral ulcers Jayson 3 biopsy from the antrum was taken. Bulb erosions noted. Postoperative Diagnosis: Reflux esophagitis.   Gastric ulcers    41460 Esophagogastroduodenoscopy, Flexible, transoral; biopsy; single or multiple      Recommendations: Await Pathology      Signed By:  Peterson Fraser MD     September 26, 2023

## 2023-09-26 NOTE — ANESTHESIA PRE PROCEDURE
Department of Anesthesiology  Preprocedure Note       Name:  Ihsan Wang. Age:  70 y.o.  :  1945                                          MRN:  [id number]         Date:  2023      Surgeon: Manny Sanchez):  Gadiel Farley MD    Procedure: Procedure(s):  COLORECTAL CANCER SCREENING, NOT HIGH RISK  EGD ESOPHAGOGASTRODUODENOSCOPY    Medications prior to admission:   Prior to Admission medications    Medication Sig Start Date End Date Taking? Authorizing Provider   gabapentin (NEURONTIN) 300 MG capsule Take 1 capsule by mouth 2 times daily for 180 days. Intended supply: 90 days 9/20/23 3/18/24  Aundrea Perez MD   [facility]) 696 MG chewable tablet Take 1 tablet by mouth 4 times daily as needed for Flatulence 23   Adelaida Barbour PA-C   bisacodyl 5 MG EC tablet Take 1 tablet by mouth See Admin Instructions Take 2 tablets at 1200 (noon) and another 2 tablets at 6 pm the day before your colonoscopy. Patient not taking: Reported on 2023   Isamar Davis PA-C   alirocumab (PRALUENT) 75 MG/ML SOAJ injection pen Inject 1 mL into the skin every 14 days 23   Ashlee Rivera MD   nitroGLYCERIN (NITROSTAT) 0.4 MG SL tablet Place 1 tablet under the tongue every 5 minutes as needed for Chest pain 23   Ashlee Rivrea MD   furosemide (LASIX) 40 MG tablet Take 1 tablet by mouth daily as needed (le edema) 23   Ashlee Rivera MD   potassium chloride (KLOR-CON M) 20 MEQ extended release tablet Take 1 tablet by mouth daily as needed (lasix use) 23   Ashlee Rivera MD   pramoxine-hydrocortisone (ANALPRAM HC) 1-1 % cream Apply topically 3 times daily.   Patient not taking: Reported on 2023 7/3/23   Pippa Ramos MD   aspirin 81 MG EC tablet Take 1 tablet by mouth daily  Patient not taking: Reported on 2023    Historical Provider, MD   ascorbic acid (VITAMIN C) 500 MG tablet Take 2 tablets by mouth    Ar Automatic Reconciliation   LORazepam

## 2023-09-26 NOTE — H&P
Damian Carcamo (:  1945) is a 70 y.o. male, new patient here for evaluation of the following chief complaint(s):change in bowels/ gerd  No chief complaint on file. ASSESSMENT/PLAN: Josephine Griffith, change in bowel habits: EGD-Colonoscopy  [unfilled]         Subjective   SUBJECTIVE/OBJECTIVE  Patient presents with GERD regurgiyation, denied dysphagia. Recent changes in bowel habits, more on constipation side. He also c/o abd bloating, gas. Denied weight loss, bleeding. Past Medical History:   Diagnosis Date    Anemia     Anxiety     Asthma     childhood    BMI 33.0-33.9,adult     CAD (coronary artery disease)     CABG/Stents; denies MI; followed by [facility]    Calcification of abdominal aorta (HCC)     Cardiac arrhythmia     atrial fib    Chronic kidney disease     Dyslipidemia     Former smoker     GERD (gastroesophageal reflux disease)     Gout     HLD (hyperlipidemia)     Hypercholesterolemia     Hypertension     Insomnia     Liver disease     ?? growth on liver    Mild episode of recurrent major depressive disorder (HCC)     HAY (obstructive sleep apnea)     non-compliant with c-pap    Paroxysmal atrial fibrillation ([address])     Poor historian     Prostatism     PUD (peptic ulcer disease)     HX    Stroke ([address])     DENIES (noted 22)       Past Surgical History:   Procedure Laterality Date    CABG, ARTERIAL, THREE  4/13/11    x3    CARDIAC CATHETERIZATION  2011    CORONARY ANGIOPLASTY WITH STENT PLACEMENT  2019    patient thinks 4 total stents; last in 2019=Successful angioplasty and stenting of the vein graft to the RCA.     INGUINAL HERNIA REPAIR Bilateral 9/15/2022    LAPAROSCOPIC BILATERAL INGUINAL HERNIA REPAIR WITH MESH performed by Bailee Hogan MD at [address] [facility] N/A 2022    LAPAROTOMY EXPLORATORY, REVISION OF MESH, LYSIS OF ADHESIONS performed by Bailee Hogan MD at [address]      right shoulder, left knee, and

## 2023-09-26 NOTE — ANESTHESIA POSTPROCEDURE EVALUATION
Department of Anesthesiology  Postprocedure Note    Patient: Magdaleno Schwab.   MRN: [id number]  YOB: 1945  Date of evaluation: 9/26/2023      Procedure Summary     Date: 09/26/23 Room / Location: [facility] ENDO 01 / [facility] ENDOSCOPY    Anesthesia Start: 2363 Anesthesia Stop: 0804    Procedures:       COLORECTAL CANCER SCREENING, NOT HIGH RISK      EGD BIOPSY (Upper GI Region) Diagnosis:       Flatulence      Change in bowel habits      (Flatulence [R14.3])      (Change in bowel habits [R19.4])    Surgeons: Yaa Mcneal MD Responsible Provider: Lori Pittman MD    Anesthesia Type: TIVA ASA Status: 3          Anesthesia Type: TIVA    Damon Phase I:      Damon Phase II: Damon Score: 8      Anesthesia Post Evaluation    Patient location during evaluation: bedside  Patient participation: complete - patient participated  Level of consciousness: awake and alert  Airway patency: patent  Nausea & Vomiting: no nausea  Complications: no  Cardiovascular status: hemodynamically stable  Respiratory status: acceptable, nonlabored ventilation and spontaneous ventilation  Hydration status: euvolemic

## 2023-09-28 ENCOUNTER — HOSPITAL ENCOUNTER (OUTPATIENT)
Dept: ULTRASOUND IMAGING | Age: 78
Discharge: HOME | End: 2023-10-01
Attending: UROLOGY

## 2023-09-28 DIAGNOSIS — N50.812 LEFT TESTICULAR PAIN: ICD-10-CM

## 2023-10-05 NOTE — RESULT ENCOUNTER NOTE
Called patient with scrotal US results. US concerning for recurrent large fat containing hernia in the L groin area. He also has left hydrocele on the left and small on R but I think that the recurrent hernia is the more likely source of his pain / discomfort in the L groin. I recommended that he go back to see Dr. Melinda Calixto to discuss scrotal US and possible need for repeat hernia repair on this left side. He will do this. Follow up left open ended with me. Ricky Metcalf M.D.     [facility]  [facility], [address] [place] [address]  Phone: [phone]  Fax: [phone]

## 2023-10-06 ENCOUNTER — OFFICE VISIT (OUTPATIENT)
Dept: GASTROENTEROLOGY | Age: 78
End: 2023-10-06
Payer: MEDICARE

## 2023-10-06 ENCOUNTER — PREP FOR PROCEDURE (OUTPATIENT)
Dept: GASTROENTEROLOGY | Age: 78
End: 2023-10-06

## 2023-10-06 VITALS
DIASTOLIC BLOOD PRESSURE: 55 MMHG | RESPIRATION RATE: 18 BRPM | WEIGHT: 216 LBS | BODY MASS INDEX: 30.92 KG/M2 | HEART RATE: 61 BPM | HEIGHT: 70 IN | TEMPERATURE: 97.2 F | SYSTOLIC BLOOD PRESSURE: 105 MMHG | OXYGEN SATURATION: 97 %

## 2023-10-06 DIAGNOSIS — K31.89: ICD-10-CM

## 2023-10-06 DIAGNOSIS — K25.7: ICD-10-CM

## 2023-10-06 DIAGNOSIS — L29.0 RECTAL ITCHING: ICD-10-CM

## 2023-10-06 DIAGNOSIS — K25.7 CHRONIC GASTRIC ULCER WITHOUT HEMORRHAGE AND WITHOUT PERFORATION: Primary | ICD-10-CM

## 2023-10-06 DIAGNOSIS — K64.9 HEMORRHOIDS, UNSPECIFIED HEMORRHOID TYPE: ICD-10-CM

## 2023-10-06 DIAGNOSIS — K20.80 ESOPHAGITIS, LOS ANGELES GRADE A: ICD-10-CM

## 2023-10-06 DIAGNOSIS — K57.90 DIVERTICULOSIS: ICD-10-CM

## 2023-10-06 PROCEDURE — 3078F DIAST BP <80 MM HG: CPT | Performed by: PHYSICIAN ASSISTANT

## 2023-10-06 PROCEDURE — 99214 OFFICE O/P EST MOD 30 MIN: CPT | Performed by: PHYSICIAN ASSISTANT

## 2023-10-06 PROCEDURE — 1123F ACP DISCUSS/DSCN MKR DOCD: CPT | Performed by: PHYSICIAN ASSISTANT

## 2023-10-06 PROCEDURE — 3074F SYST BP LT 130 MM HG: CPT | Performed by: PHYSICIAN ASSISTANT

## 2023-10-06 RX ORDER — SUCRALFATE 1 G/1
1 TABLET ORAL
Qty: 120 TABLET | Refills: 0 | Status: SHIPPED | OUTPATIENT
Start: 2023-10-06 | End: 2023-11-05

## 2023-10-06 RX ORDER — PANTOPRAZOLE SODIUM 40 MG/1
40 TABLET, DELAYED RELEASE ORAL
Qty: 180 TABLET | Refills: 0 | Status: SHIPPED | OUTPATIENT
Start: 2023-10-06 | End: 2024-01-04

## 2023-10-06 RX ORDER — SODIUM CHLORIDE 9 MG/ML
25 INJECTION, SOLUTION INTRAVENOUS PRN
Status: CANCELLED | OUTPATIENT
Start: 2023-10-06

## 2023-10-06 RX ORDER — SODIUM CHLORIDE 0.9 % (FLUSH) 0.9 %
5-40 SYRINGE (ML) INJECTION EVERY 12 HOURS SCHEDULED
Status: CANCELLED | OUTPATIENT
Start: 2023-10-06

## 2023-10-06 RX ORDER — SODIUM CHLORIDE 0.9 % (FLUSH) 0.9 %
5-40 SYRINGE (ML) INJECTION PRN
Status: CANCELLED | OUTPATIENT
Start: 2023-10-06

## 2023-10-06 NOTE — PATIENT INSTRUCTIONS
For diverticulosis:  Increase daily fiber intake to 20-30 grams daily either by dietary intake or an over-the-counter supplement such as Citrucel.

## 2023-10-06 NOTE — PROGRESS NOTES
Farhad Arriaza (:  1945) is a 70 y.o. male new patient referred to our office for evaluation of the following chief complaint(s):  Follow-up (Post procedure Colonoscopy)           ASSESSMENT/PLAN:  1. Chronic gastric ulcer without hemorrhage and without perforation  The following orders have not been finalized:  -     pantoprazole (PROTONIX) 40 MG tablet  -     sucralfate (CARAFATE) 1 GM tablet  2. Esophagitis, Hastings grade A  3. Hemorrhoids, unspecified hemorrhoid type  4. Rectal itching  5. Diverticulosis       Counseled patient and provided written recommendations for diet and lifestyle modifications for GERD. Avoid tobacco, alcohol, NSAIDs. Start PPI BID x 90 days and Carafate 1 g QID x 4 weeks. Plan repeat EGD 8-12 weeks to assess for healing. No further colonoscopies needed based on age. Counseled on basic hemorrhoid care; handouts provided. Subjective   SUBJECTIVE/OBJECTIVE  Farhad Arriaza is a 70y.o. year old male who was initially evaluated in my office  for evaluation of bloating, epigastric pain, IBS. He c/o increased belching, flatulence, and a remote history of dark stool and intermittent BRBPR. EGD and colonoscopy were performed 926 by Dr. Suzanne Kayser. EGD showed grade 1 esophagitis, superficial antral ulcers Jayson class 3, bulb erosions. Colonoscopy showed Hustisford score 8, tortuous colon most likely secondary to adhesions and recent abdominal surgery, scattered left-sided diverticulosis, internal/external hemorrhoid, and no colon polyps. Antrum biopsy showed minimal chronic inactive gastritis, negative H. pylori. He c/o acute gout flare and is ambulating with crutches today, requesting steroid injection today. Patient directed to follow-up with PCP for this complaint. Patient presents today for routine follow-up. Patient reports his belching is a little improved since last visit. Denies nausea, vomiting, abdominal pain, diarrhea, constipation, melena, hematochezia.

## 2023-10-17 ENCOUNTER — OFFICE VISIT (OUTPATIENT)
Dept: FAMILY MEDICINE CLINIC | Facility: CLINIC | Age: 78
End: 2023-10-17
Payer: MEDICARE

## 2023-10-17 VITALS
TEMPERATURE: 96.7 F | DIASTOLIC BLOOD PRESSURE: 64 MMHG | SYSTOLIC BLOOD PRESSURE: 122 MMHG | WEIGHT: 216 LBS | HEART RATE: 67 BPM | BODY MASS INDEX: 30.99 KG/M2 | OXYGEN SATURATION: 99 %

## 2023-10-17 DIAGNOSIS — N50.89 MASS, SCROTUM: ICD-10-CM

## 2023-10-17 DIAGNOSIS — M79.605 LEFT LEG PAIN: Primary | ICD-10-CM

## 2023-10-17 PROCEDURE — 1036F TOBACCO NON-USER: CPT | Performed by: FAMILY MEDICINE

## 2023-10-17 PROCEDURE — G8427 DOCREV CUR MEDS BY ELIG CLIN: HCPCS | Performed by: FAMILY MEDICINE

## 2023-10-17 PROCEDURE — G8417 CALC BMI ABV UP PARAM F/U: HCPCS | Performed by: FAMILY MEDICINE

## 2023-10-17 PROCEDURE — 3078F DIAST BP <80 MM HG: CPT | Performed by: FAMILY MEDICINE

## 2023-10-17 PROCEDURE — G8484 FLU IMMUNIZE NO ADMIN: HCPCS | Performed by: FAMILY MEDICINE

## 2023-10-17 PROCEDURE — 1123F ACP DISCUSS/DSCN MKR DOCD: CPT | Performed by: FAMILY MEDICINE

## 2023-10-17 PROCEDURE — 96372 THER/PROPH/DIAG INJ SC/IM: CPT | Performed by: FAMILY MEDICINE

## 2023-10-17 PROCEDURE — 3074F SYST BP LT 130 MM HG: CPT | Performed by: FAMILY MEDICINE

## 2023-10-17 PROCEDURE — 99213 OFFICE O/P EST LOW 20 MIN: CPT | Performed by: FAMILY MEDICINE

## 2023-10-17 RX ORDER — PAROXETINE HYDROCHLORIDE 20 MG/1
30 TABLET, FILM COATED ORAL EVERY EVENING
Qty: 45 TABLET | Refills: 3 | Status: SHIPPED | OUTPATIENT
Start: 2023-10-17

## 2023-10-17 RX ORDER — METHYLPREDNISOLONE SODIUM SUCCINATE 40 MG/ML
40 INJECTION, POWDER, LYOPHILIZED, FOR SOLUTION INTRAMUSCULAR; INTRAVENOUS ONCE
Status: COMPLETED | OUTPATIENT
Start: 2023-10-17 | End: 2023-10-17

## 2023-10-17 RX ORDER — PREDNISONE 10 MG/1
TABLET ORAL
Qty: 1 EACH | Refills: 0 | Status: SHIPPED | OUTPATIENT
Start: 2023-10-17

## 2023-10-17 RX ORDER — KETOROLAC TROMETHAMINE 30 MG/ML
30 INJECTION, SOLUTION INTRAMUSCULAR; INTRAVENOUS ONCE
Status: COMPLETED | OUTPATIENT
Start: 2023-10-17 | End: 2023-10-17

## 2023-10-17 RX ADMIN — KETOROLAC TROMETHAMINE 30 MG: 30 INJECTION, SOLUTION INTRAMUSCULAR; INTRAVENOUS at 10:44

## 2023-10-17 RX ADMIN — METHYLPREDNISOLONE SODIUM SUCCINATE 40 MG: 40 INJECTION, POWDER, LYOPHILIZED, FOR SOLUTION INTRAMUSCULAR; INTRAVENOUS at 10:45

## 2023-10-17 ASSESSMENT — ENCOUNTER SYMPTOMS
BACK PAIN: 0
GASTROINTESTINAL NEGATIVE: 1
EYES NEGATIVE: 1
RESPIRATORY NEGATIVE: 1

## 2023-10-17 NOTE — PROGRESS NOTES
HISTORY OF PRESENT ILLNESS    Mamadou Daley is a 70 y.o. male. HPI  Chief Complaint   Patient presents with    Leg Pain     L - x2weeks after bowling    Hemorrhoids     See above. Was bowling as noted. Had planted the left leg and had sudden cramp in calf that caused him to collapse to floor. Since then has had pain the extends from the left buttock to the mid left calf. No pain at rest. Severe pain when standing and walking. Denies numbness or weakness. No back pain. In Sept has surgery for apparent bilateral inguinal and for umbilical hernia. Since then has had a painful lesion in the left scrotum. Pain comes and goes randomly. No urinary symptoms. Pain is well localized with no radiation. Clarifies that hemorrhoid id resolved at present      Current Outpatient Medications on File Prior to Visit   Medication Sig Dispense Refill    sucralfate (CARAFATE) 1 GM tablet Take 1 tablet by mouth 4 times daily (before meals and nightly) 120 tablet 0    pantoprazole (PROTONIX) 40 MG tablet Take 1 tablet by mouth 2 times daily (before meals) 180 tablet 0    gabapentin (NEURONTIN) 300 MG capsule Take 1 capsule by mouth 2 times daily for 180 days. Intended supply: 90 days 180 capsule 1    simethicone (MYLICON) 284 MG chewable tablet Take 1 tablet by mouth 4 times daily as needed for Flatulence 120 tablet 0    bisacodyl 5 MG EC tablet Take 1 tablet by mouth See Admin Instructions Take 2 tablets at 1200 (noon) and another 2 tablets at 6 pm the day before your colonoscopy.  4 tablet 0    alirocumab (PRALUENT) 75 MG/ML SOAJ injection pen Inject 1 mL into the skin every 14 days 2.24 mL 5    nitroGLYCERIN (NITROSTAT) 0.4 MG SL tablet Place 1 tablet under the tongue every 5 minutes as needed for Chest pain 25 tablet 3    furosemide (LASIX) 40 MG tablet Take 1 tablet by mouth daily as needed (le edema) 90 tablet 3    potassium chloride (KLOR-CON M) 20 MEQ extended release tablet Take 1 tablet by mouth daily as needed (lasix

## 2023-10-20 ENCOUNTER — TELEPHONE (OUTPATIENT)
Dept: FAMILY MEDICINE CLINIC | Facility: CLINIC | Age: 78
End: 2023-10-20

## 2023-10-20 DIAGNOSIS — D49.59: Primary | ICD-10-CM

## 2023-10-27 ENCOUNTER — TELEPHONE (OUTPATIENT)
Dept: FAMILY MEDICINE CLINIC | Facility: CLINIC | Age: 78
End: 2023-10-27

## 2023-10-27 NOTE — TELEPHONE ENCOUNTER
Pt states he still has growth on his testicle. He states he needs a CT scan done on his groin area as well as his legs at his appt on Monday. Call pt if any questions.

## 2023-10-30 ENCOUNTER — HOSPITAL ENCOUNTER (OUTPATIENT)
Dept: CT IMAGING | Age: 78
Discharge: HOME | End: 2023-11-02
Attending: FAMILY MEDICINE
Payer: MEDICARE

## 2023-10-30 DIAGNOSIS — D49.59: ICD-10-CM

## 2023-10-30 PROCEDURE — 72192 CT PELVIS W/O DYE: CPT

## 2023-10-31 ENCOUNTER — TELEPHONE (OUTPATIENT)
Dept: FAMILY MEDICINE CLINIC | Facility: CLINIC | Age: 78
End: 2023-10-31

## 2023-10-31 DIAGNOSIS — K40.20 BILATERAL INGUINAL HERNIA WITHOUT OBSTRUCTION OR GANGRENE, RECURRENCE NOT SPECIFIED: Primary | ICD-10-CM

## 2023-10-31 DIAGNOSIS — R10.30 INGUINAL PAIN, UNSPECIFIED LATERALITY: ICD-10-CM

## 2023-10-31 NOTE — TELEPHONE ENCOUNTER
----- Message from Shivam Liz MD sent at 10/30/2023 11:10 AM EDT -----  Shows bilateral inguinal hernias.  Follow up with urologist.  ----- Message -----  From: Calvin Parrish Incoming Orders Results To [facility]  Sent: 10/30/2023  10:50 AM EDT  To: Shivam Liz MD

## 2023-11-15 ENCOUNTER — TELEPHONE (OUTPATIENT)
Dept: GASTROENTEROLOGY | Age: 78
End: 2023-11-15

## 2023-11-15 NOTE — TELEPHONE ENCOUNTER
Patient's wife Julianne Closs called to cancel surgery / stated January 2024 would be a better time to reschedule / will call patient back to reschedule in January

## 2023-11-20 ENCOUNTER — TELEPHONE (OUTPATIENT)
Dept: GASTROENTEROLOGY | Age: 78
End: 2023-11-20

## 2023-11-20 NOTE — TELEPHONE ENCOUNTER
Patient called; needs to cancel procedure at this time. Will reschedule after another surgery the first of the year. Will call at that time; also will put note on scheduling form to return call to patient in January.

## 2023-12-04 ENCOUNTER — TELEPHONE (OUTPATIENT)
Dept: FAMILY MEDICINE CLINIC | Facility: CLINIC | Age: 78
End: 2023-12-04

## 2023-12-04 ENCOUNTER — TELEPHONE (OUTPATIENT)
Dept: GASTROENTEROLOGY | Age: 78
End: 2023-12-04

## 2023-12-04 DIAGNOSIS — K40.20 NON-RECURRENT BILATERAL INGUINAL HERNIA WITHOUT OBSTRUCTION OR GANGRENE: Primary | ICD-10-CM

## 2023-12-04 NOTE — TELEPHONE ENCOUNTER
Called patient to reschedule egd with Pat / per wife Ava patient does not want to reschedule / stated he has not been taking the medication that was given at his follow up apt and does not want to have this scan done

## 2023-12-04 NOTE — TELEPHONE ENCOUNTER
----- Message from Abraham Alex sent at 12/4/2023  3:55 PM EST -----  Subject: Referral Request    Reason for referral request? Patient wants a referral to Dr Liang Ramirez for his   hernia repair. Patient has an appointment next week. Patient wants to know   if this doctor is a good one to go to. Please let him know. Provider patient wants to be referred to(if known):     Provider Phone Number(if known):     Additional Information for Provider?   ---------------------------------------------------------------------------  --------------  [address]    [phone]; OK to leave message on voicemail  ---------------------------------------------------------------------------  --------------

## 2023-12-07 ENCOUNTER — OFFICE VISIT (OUTPATIENT)
Dept: FAMILY MEDICINE CLINIC | Facility: CLINIC | Age: 78
End: 2023-12-07
Payer: MEDICARE

## 2023-12-07 VITALS
HEART RATE: 63 BPM | OXYGEN SATURATION: 100 % | WEIGHT: 213 LBS | BODY MASS INDEX: 30.56 KG/M2 | DIASTOLIC BLOOD PRESSURE: 70 MMHG | SYSTOLIC BLOOD PRESSURE: 118 MMHG

## 2023-12-07 DIAGNOSIS — R00.1 SYMPTOMATIC BRADYCARDIA: ICD-10-CM

## 2023-12-07 DIAGNOSIS — R42 DIZZINESS: ICD-10-CM

## 2023-12-07 DIAGNOSIS — R10.32 LEFT GROIN PAIN: ICD-10-CM

## 2023-12-07 DIAGNOSIS — M79.605 LEFT LEG PAIN: Primary | ICD-10-CM

## 2023-12-07 DIAGNOSIS — R09.89 BILATERAL CAROTID BRUITS: ICD-10-CM

## 2023-12-07 DIAGNOSIS — G44.86 CERVICOGENIC HEADACHE: ICD-10-CM

## 2023-12-07 LAB
ALBUMIN SERPL-MCNC: 3.9 G/DL (ref 3.2–4.6)
ALBUMIN/GLOB SERPL: 1.6 (ref 0.4–1.6)
ALP SERPL-CCNC: 35 U/L (ref 50–136)
ALT SERPL-CCNC: 21 U/L (ref 12–65)
ANION GAP SERPL CALC-SCNC: 8 MMOL/L (ref 2–11)
AST SERPL-CCNC: 19 U/L (ref 15–37)
BASOPHILS # BLD: 0.1 K/UL (ref 0–0.2)
BASOPHILS NFR BLD: 1 % (ref 0–2)
BILIRUB SERPL-MCNC: 0.8 MG/DL (ref 0.2–1.1)
BUN SERPL-MCNC: 22 MG/DL (ref 8–23)
CALCIUM SERPL-MCNC: 9.5 MG/DL (ref 8.3–10.4)
CHLORIDE SERPL-SCNC: 104 MMOL/L (ref 103–113)
CO2 SERPL-SCNC: 27 MMOL/L (ref 21–32)
CREAT SERPL-MCNC: 1.5 MG/DL (ref 0.8–1.5)
DIFFERENTIAL METHOD BLD: ABNORMAL
EOSINOPHIL # BLD: 0.1 K/UL (ref 0–0.8)
EOSINOPHIL NFR BLD: 2 % (ref 0.5–7.8)
ERYTHROCYTE [DISTWIDTH] IN BLOOD BY AUTOMATED COUNT: 13.1 % (ref 11.9–14.6)
GLOBULIN SER CALC-MCNC: 2.4 G/DL (ref 2.8–4.5)
GLUCOSE SERPL-MCNC: 85 MG/DL (ref 65–100)
HCT VFR BLD AUTO: 43.6 % (ref 41.1–50.3)
HGB BLD-MCNC: 13.9 G/DL (ref 13.6–17.2)
IMM GRANULOCYTES # BLD AUTO: 0 K/UL (ref 0–0.5)
IMM GRANULOCYTES NFR BLD AUTO: 0 % (ref 0–5)
LYMPHOCYTES # BLD: 2.1 K/UL (ref 0.5–4.6)
LYMPHOCYTES NFR BLD: 33 % (ref 13–44)
MCH RBC QN AUTO: 28.5 PG (ref 26.1–32.9)
MCHC RBC AUTO-ENTMCNC: 31.9 G/DL (ref 31.4–35)
MCV RBC AUTO: 89.3 FL (ref 82–102)
MONOCYTES # BLD: 0.8 K/UL (ref 0.1–1.3)
MONOCYTES NFR BLD: 13 % (ref 4–12)
NEUTS SEG # BLD: 3.3 K/UL (ref 1.7–8.2)
NEUTS SEG NFR BLD: 51 % (ref 43–78)
NRBC # BLD: 0 K/UL (ref 0–0.2)
PLATELET # BLD AUTO: 263 K/UL (ref 150–450)
PMV BLD AUTO: 10.3 FL (ref 9.4–12.3)
POTASSIUM SERPL-SCNC: 4.6 MMOL/L (ref 3.5–5.1)
PROT SERPL-MCNC: 6.3 G/DL (ref 6.3–8.2)
RBC # BLD AUTO: 4.88 M/UL (ref 4.23–5.6)
SODIUM SERPL-SCNC: 139 MMOL/L (ref 136–146)
WBC # BLD AUTO: 6.5 K/UL (ref 4.3–11.1)

## 2023-12-07 PROCEDURE — 99214 OFFICE O/P EST MOD 30 MIN: CPT | Performed by: FAMILY MEDICINE

## 2023-12-07 PROCEDURE — 93000 ELECTROCARDIOGRAM COMPLETE: CPT | Performed by: FAMILY MEDICINE

## 2023-12-07 PROCEDURE — 1036F TOBACCO NON-USER: CPT | Performed by: FAMILY MEDICINE

## 2023-12-07 PROCEDURE — 1123F ACP DISCUSS/DSCN MKR DOCD: CPT | Performed by: FAMILY MEDICINE

## 2023-12-07 PROCEDURE — G8484 FLU IMMUNIZE NO ADMIN: HCPCS | Performed by: FAMILY MEDICINE

## 2023-12-07 PROCEDURE — 3074F SYST BP LT 130 MM HG: CPT | Performed by: FAMILY MEDICINE

## 2023-12-07 PROCEDURE — G8417 CALC BMI ABV UP PARAM F/U: HCPCS | Performed by: FAMILY MEDICINE

## 2023-12-07 PROCEDURE — 3078F DIAST BP <80 MM HG: CPT | Performed by: FAMILY MEDICINE

## 2023-12-07 PROCEDURE — G8427 DOCREV CUR MEDS BY ELIG CLIN: HCPCS | Performed by: FAMILY MEDICINE

## 2023-12-07 ASSESSMENT — ENCOUNTER SYMPTOMS
GASTROINTESTINAL NEGATIVE: 1
RESPIRATORY NEGATIVE: 1
EYES NEGATIVE: 1

## 2023-12-07 NOTE — PROGRESS NOTES
HISTORY OF PRESENT ILLNESS    Renato Maurice is a 70 y.o. male. HPI  Chief Complaint   Patient presents with    1 Month Follow-Up    Dizziness     X1 month      See above. Left leg symptoms have resolved. Left  groin pain resolved. US showed likely fat filled hernia but recommended confirmation with urologist. Has appointment next week. For the last month has had episodes of dizziness and fatigue. States it feels like standing on wobbly boat. Occurs most frequently when standing but not always. States it lasts up to 10 minutes. No vision change or nausea. Has also had random headaches that occur separately from dizziness. Radiates from back of neck to the top of head. Resolves in about 5 minutes. Occurs with no pattern associated with time of day or activity. No focal neuro deficit. Current Outpatient Medications on File Prior to Visit   Medication Sig Dispense Refill    PARoxetine (PAXIL) 20 MG tablet Take 1.5 tablets by mouth every evening 45 tablet 3    pantoprazole (PROTONIX) 40 MG tablet Take 1 tablet by mouth 2 times daily (before meals) 180 tablet 0    gabapentin (NEURONTIN) 300 MG capsule Take 1 capsule by mouth 2 times daily for 180 days. Intended supply: 90 days 180 capsule 1    simethicone (MYLICON) 959 MG chewable tablet Take 1 tablet by mouth 4 times daily as needed for Flatulence 120 tablet 0    bisacodyl 5 MG EC tablet Take 1 tablet by mouth See Admin Instructions Take 2 tablets at 1200 (noon) and another 2 tablets at 6 pm the day before your colonoscopy.  4 tablet 0    alirocumab (PRALUENT) 75 MG/ML SOAJ injection pen Inject 1 mL into the skin every 14 days 2.24 mL 5    nitroGLYCERIN (NITROSTAT) 0.4 MG SL tablet Place 1 tablet under the tongue every 5 minutes as needed for Chest pain 25 tablet 3    furosemide (LASIX) 40 MG tablet Take 1 tablet by mouth daily as needed (le edema) 90 tablet 3    potassium chloride (KLOR-CON M) 20 MEQ extended release tablet Take 1 tablet by mouth daily as

## 2023-12-08 ENCOUNTER — TELEPHONE (OUTPATIENT)
Age: 78
End: 2023-12-08

## 2023-12-08 NOTE — TELEPHONE ENCOUNTER
Pt spouse called. Pt saw his primary Dr Miller who did an EKG. Results Symptom bradycardia. Follow up apt is 1/25/24 and wants to know if he should be seen sooner or keep the 25th.

## 2023-12-27 ENCOUNTER — HOSPITAL ENCOUNTER (OUTPATIENT)
Dept: ULTRASOUND IMAGING | Age: 78
Discharge: HOME | End: 2023-12-30
Attending: FAMILY MEDICINE
Payer: MEDICARE

## 2023-12-27 ENCOUNTER — HOSPITAL ENCOUNTER (OUTPATIENT)
Dept: MRI IMAGING | Age: 78
Discharge: HOME | End: 2023-12-30
Attending: FAMILY MEDICINE
Payer: MEDICARE

## 2023-12-27 DIAGNOSIS — R09.89 BILATERAL CAROTID BRUITS: ICD-10-CM

## 2023-12-27 DIAGNOSIS — R42 DIZZINESS: ICD-10-CM

## 2023-12-27 DIAGNOSIS — G44.86 CERVICOGENIC HEADACHE: ICD-10-CM

## 2023-12-27 PROCEDURE — A9579 GAD-BASE MR CONTRAST NOS,1ML: HCPCS | Performed by: FAMILY MEDICINE

## 2023-12-27 PROCEDURE — 2580000003 HC RX 258: Performed by: FAMILY MEDICINE

## 2023-12-27 PROCEDURE — 93880 EXTRACRANIAL BILAT STUDY: CPT

## 2023-12-27 PROCEDURE — 6360000004 HC RX CONTRAST MEDICATION: Performed by: FAMILY MEDICINE

## 2023-12-27 PROCEDURE — 70553 MRI BRAIN STEM W/O & W/DYE: CPT

## 2023-12-27 RX ORDER — SODIUM CHLORIDE 0.9 % (FLUSH) 0.9 %
10 SYRINGE (ML) INJECTION AS NEEDED
Status: DISCONTINUED | OUTPATIENT
Start: 2023-12-27 | End: 2023-12-31 | Stop reason: HOSPADM

## 2023-12-27 RX ADMIN — SODIUM CHLORIDE, PRESERVATIVE FREE 10 ML: 5 INJECTION INTRAVENOUS at 18:30

## 2023-12-27 RX ADMIN — GADOTERIDOL 18 ML: 279.3 INJECTION, SOLUTION INTRAVENOUS at 18:30

## 2023-12-28 ENCOUNTER — TELEPHONE (OUTPATIENT)
Dept: FAMILY MEDICINE CLINIC | Facility: CLINIC | Age: 78
End: 2023-12-28

## 2023-12-28 DIAGNOSIS — R09.89 BILATERAL CAROTID BRUITS: ICD-10-CM

## 2023-12-28 DIAGNOSIS — I77.1 ARTERIAL STENOSIS (HCC): Primary | ICD-10-CM

## 2023-12-28 PROCEDURE — 93880 EXTRACRANIAL BILAT STUDY: CPT | Performed by: RADIOLOGY

## 2023-12-28 NOTE — TELEPHONE ENCOUNTER
----- Message from Pippa Ramos MD sent at 12/28/2023  8:02 AM EST -----  Shows blockages on both sides.  Recommend consult with Dr Maurice Capps vascular spec.  ----- Message -----  From: Chace Parrish Incoming Orders Results To [facility]  Sent: 12/28/2023   7:47 AM EST  To: Pippa Ramos MD

## 2024-01-17 ENCOUNTER — OFFICE VISIT (OUTPATIENT)
Dept: FAMILY MEDICINE CLINIC | Facility: CLINIC | Age: 79
End: 2024-01-17
Payer: MEDICARE

## 2024-01-17 VITALS
TEMPERATURE: 97.9 F | HEART RATE: 60 BPM | SYSTOLIC BLOOD PRESSURE: 120 MMHG | DIASTOLIC BLOOD PRESSURE: 68 MMHG | BODY MASS INDEX: 30.06 KG/M2 | WEIGHT: 210 LBS | HEIGHT: 70 IN | OXYGEN SATURATION: 98 %

## 2024-01-17 DIAGNOSIS — R14.3 FLATULENCE, ERUCTATION AND GAS PAIN: ICD-10-CM

## 2024-01-17 DIAGNOSIS — B35.6 TINEA CRURIS: ICD-10-CM

## 2024-01-17 DIAGNOSIS — R14.2 FLATULENCE, ERUCTATION AND GAS PAIN: ICD-10-CM

## 2024-01-17 DIAGNOSIS — R14.3 EXCESSIVE GAS: ICD-10-CM

## 2024-01-17 DIAGNOSIS — R14.1 FLATULENCE, ERUCTATION AND GAS PAIN: ICD-10-CM

## 2024-01-17 DIAGNOSIS — K25.7 CHRONIC GASTRIC ULCER WITHOUT HEMORRHAGE AND WITHOUT PERFORATION: Primary | ICD-10-CM

## 2024-01-17 DIAGNOSIS — R14.2 BURPING: ICD-10-CM

## 2024-01-17 DIAGNOSIS — R19.5 DARK STOOLS: ICD-10-CM

## 2024-01-17 PROBLEM — I25.119 ATHEROSCLEROTIC HEART DISEASE OF NATIVE CORONARY ARTERY WITH UNSPECIFIED ANGINA PECTORIS (HCC): Status: ACTIVE | Noted: 2024-01-17

## 2024-01-17 PROBLEM — I20.9 ANGINA PECTORIS, UNSPECIFIED (HCC): Status: ACTIVE | Noted: 2024-01-17

## 2024-01-17 LAB
HEMOCCULT STL QL: NEGATIVE
VALID INTERNAL CONTROL, POC: NORMAL

## 2024-01-17 PROCEDURE — G8417 CALC BMI ABV UP PARAM F/U: HCPCS | Performed by: NURSE PRACTITIONER

## 2024-01-17 PROCEDURE — 1123F ACP DISCUSS/DSCN MKR DOCD: CPT | Performed by: NURSE PRACTITIONER

## 2024-01-17 PROCEDURE — G8484 FLU IMMUNIZE NO ADMIN: HCPCS | Performed by: NURSE PRACTITIONER

## 2024-01-17 PROCEDURE — 3074F SYST BP LT 130 MM HG: CPT | Performed by: NURSE PRACTITIONER

## 2024-01-17 PROCEDURE — 99213 OFFICE O/P EST LOW 20 MIN: CPT | Performed by: NURSE PRACTITIONER

## 2024-01-17 PROCEDURE — G8427 DOCREV CUR MEDS BY ELIG CLIN: HCPCS | Performed by: NURSE PRACTITIONER

## 2024-01-17 PROCEDURE — 1036F TOBACCO NON-USER: CPT | Performed by: NURSE PRACTITIONER

## 2024-01-17 PROCEDURE — 82274 ASSAY TEST FOR BLOOD FECAL: CPT | Performed by: NURSE PRACTITIONER

## 2024-01-17 PROCEDURE — 3078F DIAST BP <80 MM HG: CPT | Performed by: NURSE PRACTITIONER

## 2024-01-17 RX ORDER — KETOCONAZOLE 20 MG/G
CREAM TOPICAL
Qty: 30 G | Refills: 1 | Status: SHIPPED | OUTPATIENT
Start: 2024-01-17

## 2024-01-17 RX ORDER — OMEPRAZOLE 20 MG/1
20 CAPSULE, DELAYED RELEASE ORAL DAILY
Qty: 30 CAPSULE | Refills: 2 | Status: SHIPPED | OUTPATIENT
Start: 2024-01-17

## 2024-01-17 ASSESSMENT — ENCOUNTER SYMPTOMS
CHEST TIGHTNESS: 0
COUGH: 0
ABDOMINAL PAIN: 0
NAUSEA: 0
EYES NEGATIVE: 1
SHORTNESS OF BREATH: 0
BLOOD IN STOOL: 0
ALLERGIC/IMMUNOLOGIC NEGATIVE: 1
DIARRHEA: 0
VOMITING: 0
WHEEZING: 0
CONSTIPATION: 0
ABDOMINAL DISTENTION: 1

## 2024-01-17 ASSESSMENT — PATIENT HEALTH QUESTIONNAIRE - PHQ9
SUM OF ALL RESPONSES TO PHQ QUESTIONS 1-9: 0
8. MOVING OR SPEAKING SO SLOWLY THAT OTHER PEOPLE COULD HAVE NOTICED. OR THE OPPOSITE, BEING SO FIGETY OR RESTLESS THAT YOU HAVE BEEN MOVING AROUND A LOT MORE THAN USUAL: 0
SUM OF ALL RESPONSES TO PHQ QUESTIONS 1-9: 0
4. FEELING TIRED OR HAVING LITTLE ENERGY: 0
3. TROUBLE FALLING OR STAYING ASLEEP: 0
SUM OF ALL RESPONSES TO PHQ9 QUESTIONS 1 & 2: 0
9. THOUGHTS THAT YOU WOULD BE BETTER OFF DEAD, OR OF HURTING YOURSELF: 0
SUM OF ALL RESPONSES TO PHQ QUESTIONS 1-9: 0
6. FEELING BAD ABOUT YOURSELF - OR THAT YOU ARE A FAILURE OR HAVE LET YOURSELF OR YOUR FAMILY DOWN: 0
2. FEELING DOWN, DEPRESSED OR HOPELESS: 0
DEPRESSION UNABLE TO ASSESS: PT REFUSES
7. TROUBLE CONCENTRATING ON THINGS, SUCH AS READING THE NEWSPAPER OR WATCHING TELEVISION: 0
SUM OF ALL RESPONSES TO PHQ QUESTIONS 1-9: 0
5. POOR APPETITE OR OVEREATING: 0
1. LITTLE INTEREST OR PLEASURE IN DOING THINGS: 0

## 2024-01-17 NOTE — PROGRESS NOTES
anus areas., Disp-30 g, R-1, Normal  6. Dark stools  -     AMB POC Fecal Immunochemical Test (FIT)    Patient instructed to return to GI for ongoing issues and for continued ulcer treatment as he is an established patient there and possibly needs further GI workup and/or EGD. Do not suspect infection or peritonitis at this time as he has no fevers, rigidity, pain, or other signs of infection. Instructed to start taking Prilosec and GasX at this time for excessive gas and burping. Eliminate problem foods if he is able to find any links between gas and food. Maintain good water intake. Fecal occult blood test negative today so do not suspect GI bleed at this time. Begin using Ketoconazole cream on his groin and anal area as directed for resolution of tinea cruris rash.     Return if symptoms worsen or fail to improve.    JEANA Linton - NP  On this date I have spent 29 minutes reviewing previous notes, test results and face to face with the patient discussing the diagnosis and importance of compliance with the treatment plan as well as documenting on the day of the visit.

## 2024-01-18 ENCOUNTER — TELEPHONE (OUTPATIENT)
Dept: FAMILY MEDICINE CLINIC | Facility: CLINIC | Age: 79
End: 2024-01-18

## 2024-01-18 NOTE — TELEPHONE ENCOUNTER
Spoke to wife who thinks his chiropractor may be doing the Xray if he does not, they will call back

## 2024-01-18 NOTE — TELEPHONE ENCOUNTER
----- Message from Kurt Paulino sent at 1/18/2024 11:48 AM EST -----  Subject: Referral Request    Reason for referral request? patient needs order for an x-ray of hip-he   thinks he cracked a bone in his hip when he fell about a month ago-he is   using crutches to get around  Provider patient wants to be referred to(if known):     Provider Phone Number(if known):    Additional Information for Provider?   ---------------------------------------------------------------------------  --------------  CALL BACK INFO    [phone]; OK to leave message on voicemail  ---------------------------------------------------------------------------  --------------

## 2024-01-22 ENCOUNTER — OFFICE VISIT (OUTPATIENT)
Dept: VASCULAR SURGERY | Age: 79
End: 2024-01-22
Payer: MEDICARE

## 2024-01-22 VITALS
HEIGHT: 70 IN | SYSTOLIC BLOOD PRESSURE: 131 MMHG | HEART RATE: 70 BPM | WEIGHT: 204 LBS | BODY MASS INDEX: 29.2 KG/M2 | OXYGEN SATURATION: 99 % | DIASTOLIC BLOOD PRESSURE: 70 MMHG

## 2024-01-22 DIAGNOSIS — I65.23 BILATERAL CAROTID ARTERY STENOSIS: Primary | ICD-10-CM

## 2024-01-22 PROCEDURE — G8484 FLU IMMUNIZE NO ADMIN: HCPCS | Performed by: STUDENT IN AN ORGANIZED HEALTH CARE EDUCATION/TRAINING PROGRAM

## 2024-01-22 PROCEDURE — 99204 OFFICE O/P NEW MOD 45 MIN: CPT | Performed by: STUDENT IN AN ORGANIZED HEALTH CARE EDUCATION/TRAINING PROGRAM

## 2024-01-22 PROCEDURE — G8427 DOCREV CUR MEDS BY ELIG CLIN: HCPCS | Performed by: STUDENT IN AN ORGANIZED HEALTH CARE EDUCATION/TRAINING PROGRAM

## 2024-01-22 PROCEDURE — 3074F SYST BP LT 130 MM HG: CPT | Performed by: STUDENT IN AN ORGANIZED HEALTH CARE EDUCATION/TRAINING PROGRAM

## 2024-01-22 PROCEDURE — G8417 CALC BMI ABV UP PARAM F/U: HCPCS | Performed by: STUDENT IN AN ORGANIZED HEALTH CARE EDUCATION/TRAINING PROGRAM

## 2024-01-22 PROCEDURE — 3078F DIAST BP <80 MM HG: CPT | Performed by: STUDENT IN AN ORGANIZED HEALTH CARE EDUCATION/TRAINING PROGRAM

## 2024-01-22 NOTE — PROGRESS NOTES
VASCULAR SURGERY   [address] [facility] [address] [address]  [phone] FAX: [phone]        Emmett Zheng .  : 1945    Reason for visit: Carotid stenosis    Chief Complaint: 78 y.o. male presents with right hip pain and carotid bruit, found to have 50-69% right ICA stenosis on US. Denies slurred speech, changes in vision, or mono-ocular vision loss. Right hip pain after fall during bowling. Palpable DP pulses with normal DUS.     Plan:   Carotid stenosis: ASA and Praluent, f/u in 6 months for carotid DUS  PAD: ASA and Praluent, no further imaging necessary      Level 4 and Progression of chronic illness    Imaging interrupted:   Carotid DUS and BLE Art DUS  IMPRESSION:  COURTNEY: Elevated ICA velocity. Ultrasound evidence of 50 to 69% stenosis.     LICA: Mildly elevated proximal ICA velocity. Distal cervical ICA not well seen  with this exam.     CT arteriogram of the neck recommended to further characterize degree of  stenosis of each proximal internal carotid artery.      Right side findings: Resting SUSY is 1.13. The lower extremity arterial duplex reveals mild, diffuse atherosclerosis without stenosis or occlusion. The great toe pressure is 123mmHg. Probable Baker's cyst measuring 1.3cm AP x 2.8cm transverse x 5.2cm long.    Left side findings: Resting SUSY is 1.15. The lower extremity arterial duplex reveals mild, diffuse atherosclerosis without stenosis or occlusion. The great toe pressure is 113mmHg.    The abdominal aorta was evaluated and measured 2.2cm x 2.2cm at its maximum diameter, no aneurysm present.    Constitutional:   Negative for fevers and unexplained weight loss.  Eyes:   Negative for visual disturbance.  ENT:   Negative for significant hearing loss and tinnitus.  Respiratory:   Negative for hemoptysis.  Cardiovascular:   Negative except as noted in HPI.  Gastrointestinal:   Negative for melena and abdominal pain.  Genitourinary:   Negative for hematuria, renal

## 2024-01-25 ENCOUNTER — OFFICE VISIT (OUTPATIENT)
Age: 79
End: 2024-01-25
Payer: MEDICARE

## 2024-01-25 ENCOUNTER — OFFICE VISIT (OUTPATIENT)
Dept: FAMILY MEDICINE CLINIC | Facility: CLINIC | Age: 79
End: 2024-01-25

## 2024-01-25 VITALS
TEMPERATURE: 97.1 F | HEART RATE: 85 BPM | WEIGHT: 210 LBS | DIASTOLIC BLOOD PRESSURE: 60 MMHG | OXYGEN SATURATION: 99 % | BODY MASS INDEX: 30.13 KG/M2 | SYSTOLIC BLOOD PRESSURE: 122 MMHG

## 2024-01-25 VITALS
DIASTOLIC BLOOD PRESSURE: 80 MMHG | HEART RATE: 64 BPM | WEIGHT: 203 LBS | BODY MASS INDEX: 29.06 KG/M2 | HEIGHT: 70 IN | SYSTOLIC BLOOD PRESSURE: 118 MMHG

## 2024-01-25 DIAGNOSIS — I70.0 CALCIFICATION OF ABDOMINAL AORTA (HCC): ICD-10-CM

## 2024-01-25 DIAGNOSIS — N18.30 STAGE 3 CHRONIC KIDNEY DISEASE, UNSPECIFIED WHETHER STAGE 3A OR 3B CKD (HCC): ICD-10-CM

## 2024-01-25 DIAGNOSIS — Z13.6 ENCOUNTER FOR SCREENING FOR CARDIOVASCULAR DISORDERS: ICD-10-CM

## 2024-01-25 DIAGNOSIS — F33.0 MAJOR DEPRESSIVE DISORDER, RECURRENT, MILD (HCC): ICD-10-CM

## 2024-01-25 DIAGNOSIS — I10 PRIMARY HYPERTENSION: ICD-10-CM

## 2024-01-25 DIAGNOSIS — I48.0 PAROXYSMAL ATRIAL FIBRILLATION (HCC): ICD-10-CM

## 2024-01-25 DIAGNOSIS — I25.118 ATHEROSCLEROTIC HEART DISEASE OF NATIVE CORONARY ARTERY WITH OTHER FORMS OF ANGINA PECTORIS (HCC): ICD-10-CM

## 2024-01-25 DIAGNOSIS — Z78.9 STATIN INTOLERANCE: ICD-10-CM

## 2024-01-25 DIAGNOSIS — I25.10 ATHEROSCLEROSIS OF NATIVE CORONARY ARTERY OF NATIVE HEART WITHOUT ANGINA PECTORIS: Primary | ICD-10-CM

## 2024-01-25 DIAGNOSIS — Z95.1 S/P CABG (CORONARY ARTERY BYPASS GRAFT): ICD-10-CM

## 2024-01-25 DIAGNOSIS — M54.9 BACK PAIN WITH RADIATION: Primary | ICD-10-CM

## 2024-01-25 DIAGNOSIS — Z98.61 S/P PTCA (PERCUTANEOUS TRANSLUMINAL CORONARY ANGIOPLASTY): ICD-10-CM

## 2024-01-25 DIAGNOSIS — E78.5 DYSLIPIDEMIA: ICD-10-CM

## 2024-01-25 DIAGNOSIS — Z87.891 FORMER SMOKER: ICD-10-CM

## 2024-01-25 PROCEDURE — 1123F ACP DISCUSS/DSCN MKR DOCD: CPT | Performed by: INTERNAL MEDICINE

## 2024-01-25 PROCEDURE — G8484 FLU IMMUNIZE NO ADMIN: HCPCS | Performed by: INTERNAL MEDICINE

## 2024-01-25 PROCEDURE — 3079F DIAST BP 80-89 MM HG: CPT | Performed by: INTERNAL MEDICINE

## 2024-01-25 PROCEDURE — G8417 CALC BMI ABV UP PARAM F/U: HCPCS | Performed by: INTERNAL MEDICINE

## 2024-01-25 PROCEDURE — 3074F SYST BP LT 130 MM HG: CPT | Performed by: INTERNAL MEDICINE

## 2024-01-25 PROCEDURE — 1036F TOBACCO NON-USER: CPT | Performed by: INTERNAL MEDICINE

## 2024-01-25 PROCEDURE — G8427 DOCREV CUR MEDS BY ELIG CLIN: HCPCS | Performed by: INTERNAL MEDICINE

## 2024-01-25 PROCEDURE — 99214 OFFICE O/P EST MOD 30 MIN: CPT | Performed by: INTERNAL MEDICINE

## 2024-01-25 RX ORDER — PREDNISONE 10 MG/1
1 TABLET ORAL SEE ADMIN INSTRUCTIONS
Qty: 1 EACH | Refills: 0 | Status: SHIPPED | OUTPATIENT
Start: 2024-01-25

## 2024-01-25 RX ORDER — METHYLPREDNISOLONE SODIUM SUCCINATE 40 MG/ML
40 INJECTION, POWDER, LYOPHILIZED, FOR SOLUTION INTRAMUSCULAR; INTRAVENOUS ONCE
Status: COMPLETED | OUTPATIENT
Start: 2024-01-25 | End: 2024-01-25

## 2024-01-25 RX ORDER — KETOROLAC TROMETHAMINE 30 MG/ML
30 INJECTION, SOLUTION INTRAMUSCULAR; INTRAVENOUS ONCE
Status: COMPLETED | OUTPATIENT
Start: 2024-01-25 | End: 2024-01-25

## 2024-01-25 RX ORDER — ALIROCUMAB 75 MG/ML
75 INJECTION, SOLUTION SUBCUTANEOUS
Qty: 2.24 ML | Refills: 11 | Status: SHIPPED | OUTPATIENT
Start: 2024-01-25

## 2024-01-25 RX ORDER — NITROGLYCERIN 0.4 MG/1
0.4 TABLET SUBLINGUAL EVERY 5 MIN PRN
Qty: 25 TABLET | Refills: 3 | Status: SHIPPED | OUTPATIENT
Start: 2024-01-25

## 2024-01-25 RX ORDER — TIZANIDINE 4 MG/1
4 TABLET ORAL EVERY 8 HOURS PRN
Qty: 90 TABLET | Refills: 0 | Status: SHIPPED | OUTPATIENT
Start: 2024-01-25

## 2024-01-25 RX ADMIN — KETOROLAC TROMETHAMINE 30 MG: 30 INJECTION, SOLUTION INTRAMUSCULAR; INTRAVENOUS at 14:44

## 2024-01-25 RX ADMIN — METHYLPREDNISOLONE SODIUM SUCCINATE 40 MG: 40 INJECTION, POWDER, LYOPHILIZED, FOR SOLUTION INTRAMUSCULAR; INTRAVENOUS at 14:44

## 2024-01-25 ASSESSMENT — ENCOUNTER SYMPTOMS
GASTROINTESTINAL NEGATIVE: 1
RESPIRATORY NEGATIVE: 1
PHOTOPHOBIA: 0
ABDOMINAL PAIN: 0
BACK PAIN: 0
GASTROINTESTINAL NEGATIVE: 1
EYES NEGATIVE: 1
BACK PAIN: 1
EYE PAIN: 0
SHORTNESS OF BREATH: 0
ALLERGIC/IMMUNOLOGIC NEGATIVE: 1
CHEST TIGHTNESS: 0

## 2024-01-25 NOTE — PROGRESS NOTES
results found for: \"TANO\", \"CREAPOC\", \"CREA\"  Lab Results   Component Value Date/Time    K 4.6 12/07/2023 10:47 AM       Lab Results   Component Value Date/Time    CHOL 202 07/20/2023 08:49 AM    HDL 57 07/20/2023 08:49 AM       ASSESSMENT and PLAN    ICD-10-CM    1. Atherosclerosis of native coronary artery of native heart without angina pectoris  I25.10       2. Paroxysmal atrial fibrillation (HCC)  I48.0       3. Primary hypertension  I10       4. S/P PTCA (percutaneous transluminal coronary angioplasty)  Z98.61       5. S/P CABG (coronary artery bypass graft)  Z95.1       6. Dyslipidemia  E78.5 Lipid Panel      7. Statin intolerance  Z78.9           IMPRESSION:  A/P  1) CAD -continue aspirin 81 mg daily continue Praluent  2) HTN -controlled with changes to diet and lifestyle  3) HLD -on maximum tolerated therapy with a PSK 9 inhibitor.  Check level today.  4) paroxysmal atrial fibrillation no further episodes noted.      ALL ORDERS THIS ENCOUNTER  Orders Placed This Encounter    Lipid Panel     Standing Status:   Future     Standing Expiration Date:   1/25/2025    alirocumab (PRALUENT) 75 MG/ML SOAJ injection pen     Sig: Inject 1 mL into the skin every 14 days     Dispense:  2.24 mL     Refill:  11    nitroGLYCERIN (NITROSTAT) 0.4 MG SL tablet     Sig: Place 1 tablet under the tongue every 5 minutes as needed for Chest pain     Dispense:  25 tablet     Refill:  3        Follow up in 6 months.     Thank you for allowing me to participate in this patient's care.  Please call or contact me if there are any questions or concerns regarding the above.      Francisco Clay MD  01/25/24  11:47 AM

## 2024-01-25 NOTE — PROGRESS NOTES
history and medications with patient. Continue current measures. Follow up if side effects occur or if new symptoms develop.  Because of aortic calcifications and remote history of smoking schedule US to screen for aneurysms.  Notify MRI results and follow up with ortho.  Schedule PT.     Return in about 4 weeks (around 2/22/2024), or if symptoms worsen or fail to improve.    ROSALES HOUSER JR, MD   N/A

## 2024-02-01 ENCOUNTER — HOSPITAL ENCOUNTER (OUTPATIENT)
Dept: PHYSICAL THERAPY | Age: 79
Setting detail: RECURRING SERIES
Discharge: HOME | End: 2024-02-04
Payer: MEDICARE

## 2024-02-01 DIAGNOSIS — M54.59 OTHER LOW BACK PAIN: ICD-10-CM

## 2024-02-01 DIAGNOSIS — M54.51 VERTEBROGENIC LOW BACK PAIN: Primary | ICD-10-CM

## 2024-02-01 PROCEDURE — 97140 MANUAL THERAPY 1/> REGIONS: CPT

## 2024-02-01 PROCEDURE — 97110 THERAPEUTIC EXERCISES: CPT

## 2024-02-01 PROCEDURE — 97161 PT EVAL LOW COMPLEX 20 MIN: CPT

## 2024-02-01 ASSESSMENT — PAIN SCALES - GENERAL: PAINLEVEL_OUTOF10: 5

## 2024-02-01 NOTE — THERAPY EVALUATION
Emmett Zheng Jr.  : 1945  Primary: Medicare Part A And B (Medicare)  Secondary: GENERIC COMMERCIAL [facility] @ [address] [place] [address]  Phone: [phone]  Fax: [phone] Plan Frequency: 2-3x week    Plan of Care/Certification Expiration Date: 24        Plan of Care/Certification Expiration Date:  Plan of Care/Certification Expiration Date: 24    Frequency/Duration: Plan Frequency: 2-3x week      Time In/Out:   Time In: 925  Time Out: 1005      PT Visit Info:         Visit Count:  1                OUTPATIENT PHYSICAL THERAPY:             Initial Assessment 2024               Episode (Low back pain)         Treatment Diagnosis:     Vertebrogenic low back pain  Other low back pain  Medical/Referring Diagnosis:    Back pain with radiation [M54.9]    Referring Physician:  Emmett Dorado Jr., MD MD Orders:  PT Eval and Treat   Return MD Appt:    Future Appointments   Date Time Provider Department Center   2024 10:00 AM [facility] ULTRASOUND 3 [facility] AMB   2024 11:00 AM Juan Canela MD [facility] AM[facility]   2024 11:15 AM Francisco Clay MD [facility]       Date of Onset:    3 months ago.   Allergies:  Indomethacin, Atorvastatin, and Rosuvastatin  Restrictions/Precautions:    None      Medications Last Reviewed:  2024     SUBJECTIVE   History of Injury/Illness (Reason for Referral):  \"I was bowling and let the ball go and my L knee gave out.  I fell on my R hip and on the hard floor.  That's when it started.  This was about 3 months.  Patient Stated Goal(s):  \"To be able to sleep better and have less pain on the R hip.  \"  Initial Pain Level:       /10   Post Session Pain Level:      /10  Past Medical History/Comorbidities:   Mr. Zheng  has a past medical history of Anemia, Anxiety, Asthma, BMI 33.0-33.9,adult, CAD (coronary artery disease), Calcification of abdominal aorta (HCC), Cardiac arrhythmia,

## 2024-02-01 NOTE — PROGRESS NOTES
promote proper body posture.  Progressed resistance and range as indicated.     Date:  2/1/24 Date:   Date:     Activity/Exercise Parameters Parameters Parameters   LTR 10x10\"     Tilts 10x10\"     NuStep      TG hip      Gastroc      Hamstring stretch supine                  MANUAL THERAPY: (14 minutes):   Joint mobilization and Soft tissue mobilization was utilized and necessary because of the patient's restricted joint motion and painful spasm.     Pas on R GT in sidelying Gr III-, STM R low back and hip.      Treatment/Session Summary:    Treatment Assessment:   Verbalized understanding of HEP and POC.      Communication/Consultation:  Therapy Evaluation sent to referring provider  Equipment provided today:  HEP  Recommendations/Intent for next treatment session: Next visit will focus on ROM, strength, pain reduction. .  *I recommend there ex as manual did not have much relief for him.     >Total Treatment Billable Duration:  24 minutes   Time In: 0925  Time Out: 1005    Daya Fleming, PT         Charge Capture  [employer] Portal  Appt Desk     Future Appointments   Date Time Provider Department Center   2/5/2024  9:30 AM Daya Weiss PTA [facility]   7/25/2024 10:00 AM [facility] ULTRASOUND 3 [facility] AMB   7/25/2024 11:00 AM Juan Canela MD [facility] AMB   7/26/2024 11:15 AM Francisco Clay MD [facility] AMB

## 2024-02-02 ENCOUNTER — HOSPITAL ENCOUNTER (OUTPATIENT)
Dept: PHYSICAL THERAPY | Age: 79
Setting detail: RECURRING SERIES
End: 2024-02-02
Payer: MEDICARE

## 2024-02-05 ENCOUNTER — HOSPITAL ENCOUNTER (OUTPATIENT)
Dept: PHYSICAL THERAPY | Age: 79
Setting detail: RECURRING SERIES
Discharge: HOME | End: 2024-02-08
Payer: MEDICARE

## 2024-02-05 PROCEDURE — 97110 THERAPEUTIC EXERCISES: CPT

## 2024-02-05 NOTE — PROGRESS NOTES
Emmett Zheng Jr.  : 1945  Primary: Medicare Part A And B (Medicare)  Secondary: GENERIC COMMERCIAL [facility] @ [address] [place] [address]  Phone: [phone]  Fax: [phone] Plan Frequency: 2-3x week    Plan of Care/Certification Expiration Date: 24        Plan of Care/Certification Expiration Date:  Plan of Care/Certification Expiration Date: 24    Frequency/Duration:   Plan Frequency: 2-3x week      Time In/Out:   Time In: 0945  Time Out: 1015      PT Visit Info:         Visit Count:  2    OUTPATIENT PHYSICAL THERAPY:   Treatment Note 2024       Episode  (Low back pain)               Treatment Diagnosis:    Vertebrogenic low back pain  Other low back pain  Medical/Referring Diagnosis:    No admission diagnoses are documented for this encounter.    Referring Physician:  Emmett Dorado Jr., MD MD Orders:  PT Eval and Treat   Return MD Appt:    Future Appointments   Date Time Provider Department Center   2024  9:30 AM Daya Weiss, PTA [facility]   2024  9:30 AM Daya Weiss, PTA [facility]   2024  8:45 AM Daya Fleming, PT [facility]   2024  9:30 AM Daya Fleming, PT [facility]   2024  2:30 PM Daya Weiss, PTA [facility]   2024 10:00 AM [facility] ULTRASOUND 3 [facility] AMB   2024 11:00 AM Juan Canela MD [facility] AMB   2024 11:15 AM Francisco Clay MD [facility] AMB        Date of Onset:  No data recorded   Allergies:   Indomethacin, Atorvastatin, and Rosuvastatin  Restrictions/Precautions:   None      Interventions Planned (Treatment may consist of any combination of the following):     See Assessment Note    Subjective Comments: Patient reports still having discomfort in his hip today.    Initial Pain Level::     3 /10  Post Session Pain Level:       10  Medications Last Reviewed:  2024  Updated Objective Findings:  None Today  Treatment

## 2024-02-13 ENCOUNTER — HOSPITAL ENCOUNTER (OUTPATIENT)
Dept: PHYSICAL THERAPY | Age: 79
Setting detail: RECURRING SERIES
Discharge: HOME | End: 2024-02-16
Payer: MEDICARE

## 2024-02-13 PROCEDURE — 97110 THERAPEUTIC EXERCISES: CPT

## 2024-02-13 NOTE — PROGRESS NOTES
Emmett Zheng Jr.  : 1945  Primary: Medicare Part A And B (Medicare)  Secondary: GENERIC COMMERCIAL [facility] @ [address] [place] [address]  Phone: [phone]  Fax: [phone] Plan Frequency: 2-3x week    Plan of Care/Certification Expiration Date: 24        Plan of Care/Certification Expiration Date:  Plan of Care/Certification Expiration Date: 24    Frequency/Duration:   Plan Frequency: 2-3x week      Time In/Out:   Time In: 930  Time Out: 1015      PT Visit Info:         Visit Count:  3    OUTPATIENT PHYSICAL THERAPY:   Treatment Note 2024       Episode  (Low back pain)               Treatment Diagnosis:    Vertebrogenic low back pain  Other low back pain  Medical/Referring Diagnosis:    No admission diagnoses are documented for this encounter.    Referring Physician:  Emmett Dorado Jr., MD MD Orders:  PT Eval and Treat   Return MD Appt:    Future Appointments   Date Time Provider Department Center   2024  8:45 AM [facility] MRI UNIT 1 [facility]   2024  9:30 AM [facility] US LOGIC 7 [facility]US [facility]   2024 11:00 AM Collins Alvarez MD [facility] AMB   2024 10:00 AM [facility] ULTRASOUND 3 [facility] AMB   2024 11:00 AM Juan Canela MD [facility] AMB   2024 11:15 AM Francisco Clay MD [facility] AMB        Date of Onset:  No data recorded   Allergies:   Indomethacin, Atorvastatin, and Rosuvastatin  Restrictions/Precautions:   None      Interventions Planned (Treatment may consist of any combination of the following):     See Assessment Note    Subjective Comments: Patient reports still having discomfort in his hip and groin area today.    Initial Pain Level::     3 /10  Post Session Pain Level:       1 /10  Medications Last Reviewed:  2024  Updated Objective Findings:  None Today  Treatment   THERAPEUTIC EXERCISE: (30 minutes):    Exercises per grid below to improve mobility, strength, and coordination.

## 2024-02-19 ENCOUNTER — APPOINTMENT (OUTPATIENT)
Dept: PHYSICAL THERAPY | Age: 79
End: 2024-02-19
Payer: MEDICARE

## 2024-02-21 ENCOUNTER — HOSPITAL ENCOUNTER (OUTPATIENT)
Dept: ULTRASOUND IMAGING | Age: 79
Discharge: HOME | End: 2024-02-24
Attending: FAMILY MEDICINE
Payer: MEDICARE

## 2024-02-21 ENCOUNTER — HOSPITAL ENCOUNTER (OUTPATIENT)
Dept: MRI IMAGING | Age: 79
Discharge: HOME | End: 2024-02-24
Attending: FAMILY MEDICINE
Payer: MEDICARE

## 2024-02-21 DIAGNOSIS — I70.0 CALCIFICATION OF ABDOMINAL AORTA (HCC): ICD-10-CM

## 2024-02-21 DIAGNOSIS — I25.118 ATHEROSCLEROTIC HEART DISEASE OF NATIVE CORONARY ARTERY WITH OTHER FORMS OF ANGINA PECTORIS (HCC): ICD-10-CM

## 2024-02-21 DIAGNOSIS — Z13.6 ENCOUNTER FOR SCREENING FOR CARDIOVASCULAR DISORDERS: ICD-10-CM

## 2024-02-21 DIAGNOSIS — Z87.891 FORMER SMOKER: ICD-10-CM

## 2024-02-21 DIAGNOSIS — M54.9 BACK PAIN WITH RADIATION: ICD-10-CM

## 2024-02-21 PROCEDURE — 76706 US ABDL AORTA SCREEN AAA: CPT

## 2024-02-21 PROCEDURE — 72148 MRI LUMBAR SPINE W/O DYE: CPT

## 2024-02-22 ENCOUNTER — APPOINTMENT (OUTPATIENT)
Dept: PHYSICAL THERAPY | Age: 79
End: 2024-02-22
Payer: MEDICARE

## 2024-02-23 ENCOUNTER — OFFICE VISIT (OUTPATIENT)
Dept: ORTHOPEDIC SURGERY | Age: 79
End: 2024-02-23

## 2024-02-23 VITALS — WEIGHT: 212 LBS | HEIGHT: 69 IN | BODY MASS INDEX: 31.4 KG/M2

## 2024-02-23 DIAGNOSIS — M54.9 BACK PAIN, UNSPECIFIED BACK LOCATION, UNSPECIFIED BACK PAIN LATERALITY, UNSPECIFIED CHRONICITY: ICD-10-CM

## 2024-02-23 DIAGNOSIS — M51.36 DDD (DEGENERATIVE DISC DISEASE), LUMBAR: Primary | ICD-10-CM

## 2024-02-23 NOTE — PROGRESS NOTES
Name: Emmett Zheng Jr.  YOB: 1945  Gender: male  MRN: [id number]  Age: 78 y.o.      Chief Complaint: Back pain and right leg pain    History of Present Illness:      This is a very pleasant 78 y.o. male who presents with a history of back pain and right leg pain since November 2023 after a fall.  Since that time he had right-sided low back pain and right lateral thigh pain.  The pain does not radiate past the knee.  He has been slowly getting better.  His pain currently is 4/10 in severity and constant.  Of note he is scheduled for bilateral inguinal hernia repair surgery next week.        Medications:       Current Outpatient Medications:     alirocumab (PRALUENT) 75 MG/ML SOAJ injection pen, Inject 1 mL into the skin every 14 days, Disp: 2.24 mL, Rfl: 11    nitroGLYCERIN (NITROSTAT) 0.4 MG SL tablet, Place 1 tablet under the tongue every 5 minutes as needed for Chest pain, Disp: 25 tablet, Rfl: 3    tiZANidine (ZANAFLEX) 4 MG tablet, Take 1 tablet by mouth every 8 hours as needed (spasm), Disp: 90 tablet, Rfl: 0    predniSONE 10 MG (21) TBPK, Take 1 dose pack by mouth See Admin Instructions, Disp: 1 each, Rfl: 0    ketoconazole (NIZORAL) 2 % cream, Apply topically daily to groin and anus areas., Disp: 30 g, Rfl: 1    PARoxetine (PAXIL) 20 MG tablet, Take 1.5 tablets by mouth every evening, Disp: 45 tablet, Rfl: 3    simethicone (MYLICON) 125 MG chewable tablet, Take 1 tablet by mouth 4 times daily as needed for Flatulence, Disp: 120 tablet, Rfl: 0    furosemide (LASIX) 40 MG tablet, Take 1 tablet by mouth daily as needed (le edema), Disp: 90 tablet, Rfl: 3    potassium chloride (KLOR-CON M) 20 MEQ extended release tablet, Take 1 tablet by mouth daily as needed (lasix use), Disp: 90 tablet, Rfl: 3    aspirin 81 MG EC tablet, Take 1 tablet by mouth daily, Disp: , Rfl:     ascorbic acid (VITAMIN C) 500 MG tablet, Take 2 tablets by mouth, Disp: , Rfl:     LORazepam (ATIVAN) 1 MG tablet, Take 1

## 2024-02-26 ENCOUNTER — APPOINTMENT (OUTPATIENT)
Dept: PHYSICAL THERAPY | Age: 79
End: 2024-02-26
Payer: MEDICARE

## 2024-02-29 ENCOUNTER — APPOINTMENT (OUTPATIENT)
Dept: PHYSICAL THERAPY | Age: 79
End: 2024-02-29
Payer: MEDICARE

## 2024-03-06 RX ORDER — ALIROCUMAB 75 MG/ML
75 INJECTION, SOLUTION SUBCUTANEOUS
Qty: 2 ADJUSTABLE DOSE PRE-FILLED PEN SYRINGE | Refills: 11 | Status: SHIPPED | OUTPATIENT
Start: 2024-03-06

## 2024-03-19 ENCOUNTER — TELEPHONE (OUTPATIENT)
Age: 79
End: 2024-03-19

## 2024-03-19 NOTE — TELEPHONE ENCOUNTER
Needs a prior auth on Praluent with insurance company Please call wife and let her know     Phone # [phone]

## 2024-03-25 NOTE — TELEPHONE ENCOUNTER
Called [facility] to obtain ID and RxBIN.     ID [id number] RxBIN [id number].    Praluent 75MG/ML auto-injectors    PA submitted.     Approved on March 26  Request Reference Number: [id number]. PRALUENT INJ 75MG/ML is approved through 09/25/2024.

## 2024-03-26 ENCOUNTER — TELEPHONE (OUTPATIENT)
Age: 79
End: 2024-03-26

## 2024-03-26 NOTE — TELEPHONE ENCOUNTER
I have a called on [place] [address] re: [phone] that Hanna is working on but they have more information/ this is the insurance company - [employer]    I spoke to MJ at [employer] [phone]  Ref # [id number]    She states additional questions are needed for the PA. She faxed the questions to the Coumadin fax.    Questions received and answered.    Everything was faxed to [employer] at [phone]. Fax confirmation received

## 2024-03-26 NOTE — TELEPHONE ENCOUNTER
I spoke to MELCHOR. She states they have additional questions for us to answer and fax back.     They are asking questions based on an LDL within 120 days. The last LDL on File was 2023 in which his LDL went up.    I called the pt  his wife and left a message asking one of them to call me back. I need the pt to get Lipids drawn ASAP. The PA will  on 3/28/24.

## 2024-04-08 ENCOUNTER — TELEPHONE (OUTPATIENT)
Age: 79
End: 2024-04-08

## 2024-04-08 NOTE — TELEPHONE ENCOUNTER
Additional information was submitted to [employer] on 3/26/24 for  a Prior Auth [facility] [id number].    The PA was approved 3/25/24-09/25/24. The patient and pharmacy were notified.

## 2024-06-13 RX ORDER — PAROXETINE HYDROCHLORIDE 20 MG/1
TABLET, FILM COATED ORAL
Qty: 135 TABLET | Refills: 2 | OUTPATIENT
Start: 2024-06-13

## 2024-07-16 RX ORDER — PAROXETINE 20 MG/1
TABLET, FILM COATED ORAL
Qty: 45 TABLET | Refills: 3 | OUTPATIENT
Start: 2024-07-16

## 2024-07-26 ENCOUNTER — OFFICE VISIT (OUTPATIENT)
Age: 79
End: 2024-07-26
Payer: MEDICARE

## 2024-07-26 VITALS
BODY MASS INDEX: 33.47 KG/M2 | SYSTOLIC BLOOD PRESSURE: 124 MMHG | HEIGHT: 69 IN | DIASTOLIC BLOOD PRESSURE: 78 MMHG | WEIGHT: 226 LBS | HEART RATE: 72 BPM

## 2024-07-26 DIAGNOSIS — I25.810 CORONARY ARTERY DISEASE INVOLVING CORONARY BYPASS GRAFT OF NATIVE HEART WITHOUT ANGINA PECTORIS: Primary | ICD-10-CM

## 2024-07-26 DIAGNOSIS — I10 PRIMARY HYPERTENSION: ICD-10-CM

## 2024-07-26 DIAGNOSIS — E78.2 MIXED HYPERLIPIDEMIA: ICD-10-CM

## 2024-07-26 LAB
CHOLEST SERPL-MCNC: 205 MG/DL (ref 0–200)
HDLC SERPL-MCNC: 55 MG/DL (ref 40–60)
HDLC SERPL: 3.7 (ref 0–5)
LDLC SERPL CALC-MCNC: 106 MG/DL (ref 0–100)
TRIGL SERPL-MCNC: 221 MG/DL (ref 0–150)
VLDLC SERPL CALC-MCNC: 44 MG/DL (ref 6–23)

## 2024-07-26 PROCEDURE — 3078F DIAST BP <80 MM HG: CPT | Performed by: INTERNAL MEDICINE

## 2024-07-26 PROCEDURE — G8417 CALC BMI ABV UP PARAM F/U: HCPCS | Performed by: INTERNAL MEDICINE

## 2024-07-26 PROCEDURE — 3074F SYST BP LT 130 MM HG: CPT | Performed by: INTERNAL MEDICINE

## 2024-07-26 PROCEDURE — 1036F TOBACCO NON-USER: CPT | Performed by: INTERNAL MEDICINE

## 2024-07-26 PROCEDURE — 1123F ACP DISCUSS/DSCN MKR DOCD: CPT | Performed by: INTERNAL MEDICINE

## 2024-07-26 PROCEDURE — 99214 OFFICE O/P EST MOD 30 MIN: CPT | Performed by: INTERNAL MEDICINE

## 2024-07-26 PROCEDURE — G8427 DOCREV CUR MEDS BY ELIG CLIN: HCPCS | Performed by: INTERNAL MEDICINE

## 2024-07-26 ASSESSMENT — ENCOUNTER SYMPTOMS
ABDOMINAL PAIN: 0
SHORTNESS OF BREATH: 0
EYE PAIN: 0
GASTROINTESTINAL NEGATIVE: 1
RESPIRATORY NEGATIVE: 1
ALLERGIC/IMMUNOLOGIC NEGATIVE: 1
PHOTOPHOBIA: 0
CHEST TIGHTNESS: 0
EYES NEGATIVE: 1
BACK PAIN: 0

## 2024-07-26 NOTE — PROGRESS NOTES
[facility] CARDIOLOGY  [address] [place] [address], [address]  [place] [address]  PHONE: [phone]      24    NAME:  Emmett Zheng Jr.  : 1945  MRN: [id number]         SUBJECTIVE:   Emmett Zheng Jr. is a 78 y.o. male seen for follow up of:      Chief Complaint   Patient presents with    6 Month Follow-Up    Coronary Artery Disease    Hypertension           Cardiac Hx (Reviewed and summarized by me):  1) CAD              CABG  Dr. Bola Haddad [facility] - LIMA to LAD, SVG to Circ, SVG to RCA              Cath - 19 PCI prox SVG to RCA (Joaquin), w/patent LIMA to LAD, occluded SVG to Circ, Stents in the circ              NM stress test 2021 - normal perfusion, normal LV function  2) HTN  3) HLD -               22 - HDL 55, LDL 96, Trig 255              23 - HDL 57, .2, Trig 194, Ratio 3.5  4) Paroxysmal Afib - failed blood thinners (stopped in the distant past but pt cannot remember why see note from 3/13/23)    March - hernia operation Dr. Gleason at [facility].    HPI:  Doing well.  Denies cardiac symptoms.  Has not been very active.  No recent lipid panel.  Blood pressure is well-controlled.    Past Medical History, Past Surgical History, Family history, Social History, and Medications were all reviewed with the patient today and updated as necessary.       Current Outpatient Medications:     PRALUENT 75 MG/ML SOAJ injection pen, INJECT 1 ML INTO THE SKIN EVERY 14 DAYS, Disp: 2 Adjustable Dose Pre-filled Pen Syringe, Rfl: 11    nitroGLYCERIN (NITROSTAT) 0.4 MG SL tablet, Place 1 tablet under the tongue every 5 minutes as needed for Chest pain, Disp: 25 tablet, Rfl: 3    tiZANidine (ZANAFLEX) 4 MG tablet, Take 1 tablet by mouth every 8 hours as needed (spasm), Disp: 90 tablet, Rfl: 0    predniSONE 10 MG (21) TBPK, Take 1 dose pack by mouth See Admin Instructions, Disp: 1 each, Rfl: 0    ketoconazole (NIZORAL) 2 % cream, Apply topically daily to groin and anus areas.,

## 2024-08-08 ENCOUNTER — OFFICE VISIT (OUTPATIENT)
Dept: VASCULAR SURGERY | Age: 79
End: 2024-08-08
Payer: MEDICARE

## 2024-08-08 VITALS
SYSTOLIC BLOOD PRESSURE: 154 MMHG | DIASTOLIC BLOOD PRESSURE: 80 MMHG | HEIGHT: 69 IN | OXYGEN SATURATION: 95 % | BODY MASS INDEX: 33.18 KG/M2 | HEART RATE: 77 BPM | WEIGHT: 224 LBS

## 2024-08-08 DIAGNOSIS — I65.21 STENOSIS OF RIGHT CAROTID ARTERY: Primary | ICD-10-CM

## 2024-08-08 PROCEDURE — 99213 OFFICE O/P EST LOW 20 MIN: CPT | Performed by: STUDENT IN AN ORGANIZED HEALTH CARE EDUCATION/TRAINING PROGRAM

## 2024-08-08 PROCEDURE — G8427 DOCREV CUR MEDS BY ELIG CLIN: HCPCS | Performed by: STUDENT IN AN ORGANIZED HEALTH CARE EDUCATION/TRAINING PROGRAM

## 2024-08-08 PROCEDURE — 1036F TOBACCO NON-USER: CPT | Performed by: STUDENT IN AN ORGANIZED HEALTH CARE EDUCATION/TRAINING PROGRAM

## 2024-08-08 PROCEDURE — G8417 CALC BMI ABV UP PARAM F/U: HCPCS | Performed by: STUDENT IN AN ORGANIZED HEALTH CARE EDUCATION/TRAINING PROGRAM

## 2024-08-08 PROCEDURE — 1123F ACP DISCUSS/DSCN MKR DOCD: CPT | Performed by: STUDENT IN AN ORGANIZED HEALTH CARE EDUCATION/TRAINING PROGRAM

## 2024-08-08 PROCEDURE — 3077F SYST BP >= 140 MM HG: CPT | Performed by: STUDENT IN AN ORGANIZED HEALTH CARE EDUCATION/TRAINING PROGRAM

## 2024-08-08 PROCEDURE — 3079F DIAST BP 80-89 MM HG: CPT | Performed by: STUDENT IN AN ORGANIZED HEALTH CARE EDUCATION/TRAINING PROGRAM

## 2024-08-08 NOTE — PROGRESS NOTES
VASCULAR SURGERY   [address] [facility] [address] [address]  [phone] FAX: [phone]        Emmett Zheng .  : 1945    Reason for visit: Carotid stenosis    Chief Complaint: 79 y.o. male presents with right hip pain and carotid bruit, found to have <50% right ICA stenosis on US. Denies slurred speech, changes in vision, or mono-ocular vision loss. Palpable DP pulses with normal DUS.     Plan:   Asymptomatic right carotid stenosis approx 50%: ASA and Praluent, f/u in 6 months for carotid DUS  PAD: ASA and Praluent, no further imaging necessary      Level 4 and Progression of chronic illness    Imaging interrupted:   Carotid DUS    Mild (<50%) stenosis in the right internal carotid artery.  Calcific plaque in the right internal carotid artery.    Mild (<50%) stenosis in the left internal carotid artery.  Calcific plaque in the left internal carotid artery.    Normal antegrade flow involving the right vertebral artery.    Normal antegrade flow involving the left vertebral artery.      Physical Examination:   Height: 1.753 m (5' 9\"), Weight - Scale: 101.6 kg (224 lb), BP: (!) 154/80    General: No acute distress  HENT: AT  CV: Regular rhythm.    LUNG: No respiratory distress on RA  Abdominal: No distension.  Extremities: No wounds or edema, motor and sensation grossly intact  Vascular:   Radial:  L    2+     R    2+    DP:   L    2+     R    2+     Past Medical History:   Diagnosis Date    Anemia     Anxiety     Asthma     childhood    BMI 33.0-33.9,adult     CAD (coronary artery disease)     CABG/Stents; denies MI; followed by [facility]    Calcification of abdominal aorta (HCC)     Cardiac arrhythmia     atrial fib    Chronic kidney disease     Dyslipidemia     Former smoker     GERD (gastroesophageal reflux disease)     Gout     HLD (hyperlipidemia)     Hypercholesterolemia     Hypertension     Insomnia     Liver disease     ?? growth on liver    Mild episode of recurrent

## 2024-10-11 ENCOUNTER — TELEPHONE (OUTPATIENT)
Age: 79
End: 2024-10-11

## 2024-10-18 NOTE — TELEPHONE ENCOUNTER
Checked status of Praluent PA via Cover My Meds. Still pending. Called [employer]. Spoke with Howard.Praluent approved 10/12/2024 through 10/31/2025. [id number].

## 2024-10-22 ENCOUNTER — OFFICE VISIT (OUTPATIENT)
Dept: FAMILY MEDICINE CLINIC | Facility: CLINIC | Age: 79
End: 2024-10-22
Payer: MEDICARE

## 2024-10-22 VITALS
HEART RATE: 77 BPM | TEMPERATURE: 97 F | DIASTOLIC BLOOD PRESSURE: 70 MMHG | SYSTOLIC BLOOD PRESSURE: 130 MMHG | BODY MASS INDEX: 32.93 KG/M2 | OXYGEN SATURATION: 97 % | WEIGHT: 223 LBS

## 2024-10-22 DIAGNOSIS — F09 COGNITIVE DYSFUNCTION: Primary | ICD-10-CM

## 2024-10-22 PROCEDURE — G8427 DOCREV CUR MEDS BY ELIG CLIN: HCPCS | Performed by: FAMILY MEDICINE

## 2024-10-22 PROCEDURE — 1123F ACP DISCUSS/DSCN MKR DOCD: CPT | Performed by: FAMILY MEDICINE

## 2024-10-22 PROCEDURE — G8417 CALC BMI ABV UP PARAM F/U: HCPCS | Performed by: FAMILY MEDICINE

## 2024-10-22 PROCEDURE — G8484 FLU IMMUNIZE NO ADMIN: HCPCS | Performed by: FAMILY MEDICINE

## 2024-10-22 PROCEDURE — 1036F TOBACCO NON-USER: CPT | Performed by: FAMILY MEDICINE

## 2024-10-22 PROCEDURE — 3075F SYST BP GE 130 - 139MM HG: CPT | Performed by: FAMILY MEDICINE

## 2024-10-22 PROCEDURE — 99213 OFFICE O/P EST LOW 20 MIN: CPT | Performed by: FAMILY MEDICINE

## 2024-10-22 PROCEDURE — 3078F DIAST BP <80 MM HG: CPT | Performed by: FAMILY MEDICINE

## 2024-10-22 SDOH — ECONOMIC STABILITY: FOOD INSECURITY: WITHIN THE PAST 12 MONTHS, THE FOOD YOU BOUGHT JUST DIDN'T LAST AND YOU DIDN'T HAVE MONEY TO GET MORE.: NEVER TRUE

## 2024-10-22 SDOH — ECONOMIC STABILITY: FOOD INSECURITY: WITHIN THE PAST 12 MONTHS, YOU WORRIED THAT YOUR FOOD WOULD RUN OUT BEFORE YOU GOT MONEY TO BUY MORE.: NEVER TRUE

## 2024-10-22 SDOH — ECONOMIC STABILITY: INCOME INSECURITY: HOW HARD IS IT FOR YOU TO PAY FOR THE VERY BASICS LIKE FOOD, HOUSING, MEDICAL CARE, AND HEATING?: NOT HARD AT ALL

## 2024-10-22 ASSESSMENT — ENCOUNTER SYMPTOMS
GASTROINTESTINAL NEGATIVE: 1
EYES NEGATIVE: 1
RESPIRATORY NEGATIVE: 1

## 2024-10-22 NOTE — PROGRESS NOTES
HISTORY OF PRESENT ILLNESS    Emmett Zheng Jr. is a 79 y.o. male.  HPI  Chief Complaint   Patient presents with    short term memory loss     See above. Wife has noted over the last year patient has had increased forgetfulness. Occasional forgets directions to familiar locations. Has difficulty playing simple games he uses to play with grandchildren. Periodic frustration with short term agitation. Does not wander. Denies headaches or vision change. MRI about a month ago was normal. No focal neuro deficit.      Current Outpatient Medications on File Prior to Visit   Medication Sig Dispense Refill    PRALUENT 75 MG/ML SOAJ injection pen INJECT 1 ML INTO THE SKIN EVERY 14 DAYS 2 Adjustable Dose Pre-filled Pen Syringe 11    nitroGLYCERIN (NITROSTAT) 0.4 MG SL tablet Place 1 tablet under the tongue every 5 minutes as needed for Chest pain 25 tablet 3    tiZANidine (ZANAFLEX) 4 MG tablet Take 1 tablet by mouth every 8 hours as needed (spasm) 90 tablet 0    PARoxetine (PAXIL) 20 MG tablet Take 1.5 tablets by mouth every evening 45 tablet 3    simethicone (MYLICON) 125 MG chewable tablet Take 1 tablet by mouth 4 times daily as needed for Flatulence 120 tablet 0    furosemide (LASIX) 40 MG tablet Take 1 tablet by mouth daily as needed (le edema) 90 tablet 3    potassium chloride (KLOR-CON M) 20 MEQ extended release tablet Take 1 tablet by mouth daily as needed (lasix use) 90 tablet 3    aspirin 81 MG EC tablet Take 1 tablet by mouth daily      ascorbic acid (VITAMIN C) 500 MG tablet Take 2 tablets by mouth      LORazepam (ATIVAN) 1 MG tablet Take 1 tablet by mouth 2 times daily as needed.      montelukast (SINGULAIR) 10 MG tablet Take 1 tablet by mouth nightly TAKE 1 TABLET BY MOUTH EVERY DAY      thiamine 100 MG tablet Take 1 tablet by mouth daily      vitamin E 400 UNIT capsule Take by mouth daily      ketoconazole (NIZORAL) 2 % cream Apply topically daily to groin and anus areas. (Patient not taking: Reported on 
I have reviewed and confirmed nurses' notes...

## 2024-12-02 ENCOUNTER — PATIENT MESSAGE (OUTPATIENT)
Dept: FAMILY MEDICINE CLINIC | Facility: CLINIC | Age: 79
End: 2024-12-02

## 2024-12-02 DIAGNOSIS — F41.9 ANXIETY: Primary | ICD-10-CM

## 2024-12-02 RX ORDER — PAROXETINE 30 MG/1
30 TABLET, FILM COATED ORAL DAILY
Qty: 90 TABLET | Refills: 3 | Status: SHIPPED | OUTPATIENT
Start: 2024-12-02

## 2024-12-02 RX ORDER — LORAZEPAM 1 MG/1
1 TABLET ORAL 2 TIMES DAILY PRN
Qty: 60 TABLET | Refills: 5 | Status: SHIPPED | OUTPATIENT
Start: 2024-12-02 | End: 2025-05-31

## 2025-01-21 ENCOUNTER — OFFICE VISIT (OUTPATIENT)
Dept: NEUROLOGY | Age: 80
End: 2025-01-21
Payer: MEDICARE

## 2025-01-21 VITALS
WEIGHT: 226.4 LBS | OXYGEN SATURATION: 96 % | HEIGHT: 69 IN | HEART RATE: 73 BPM | BODY MASS INDEX: 33.53 KG/M2 | DIASTOLIC BLOOD PRESSURE: 72 MMHG | SYSTOLIC BLOOD PRESSURE: 128 MMHG

## 2025-01-21 DIAGNOSIS — F03.B0 MODERATE DEMENTIA WITHOUT BEHAVIORAL DISTURBANCE, PSYCHOTIC DISTURBANCE, MOOD DISTURBANCE, OR ANXIETY, UNSPECIFIED DEMENTIA TYPE (HCC): Primary | ICD-10-CM

## 2025-01-21 DIAGNOSIS — R41.3 MEMORY LOSS: ICD-10-CM

## 2025-01-21 LAB — FOLATE SERPL-MCNC: 7.6 NG/ML (ref 3.1–17.5)

## 2025-01-21 PROCEDURE — 1160F RVW MEDS BY RX/DR IN RCRD: CPT | Performed by: PSYCHIATRY & NEUROLOGY

## 2025-01-21 PROCEDURE — G8417 CALC BMI ABV UP PARAM F/U: HCPCS | Performed by: PSYCHIATRY & NEUROLOGY

## 2025-01-21 PROCEDURE — 99205 OFFICE O/P NEW HI 60 MIN: CPT | Performed by: PSYCHIATRY & NEUROLOGY

## 2025-01-21 PROCEDURE — G8427 DOCREV CUR MEDS BY ELIG CLIN: HCPCS | Performed by: PSYCHIATRY & NEUROLOGY

## 2025-01-21 PROCEDURE — 1123F ACP DISCUSS/DSCN MKR DOCD: CPT | Performed by: PSYCHIATRY & NEUROLOGY

## 2025-01-21 PROCEDURE — 1159F MED LIST DOCD IN RCRD: CPT | Performed by: PSYCHIATRY & NEUROLOGY

## 2025-01-21 PROCEDURE — 3078F DIAST BP <80 MM HG: CPT | Performed by: PSYCHIATRY & NEUROLOGY

## 2025-01-21 PROCEDURE — 3074F SYST BP LT 130 MM HG: CPT | Performed by: PSYCHIATRY & NEUROLOGY

## 2025-01-21 PROCEDURE — 1036F TOBACCO NON-USER: CPT | Performed by: PSYCHIATRY & NEUROLOGY

## 2025-01-21 RX ORDER — DONEPEZIL HYDROCHLORIDE 10 MG/1
10 TABLET, FILM COATED ORAL NIGHTLY
Qty: 90 TABLET | Refills: 3 | Status: SHIPPED | OUTPATIENT
Start: 2025-01-21 | End: 2026-01-21

## 2025-01-21 NOTE — PROGRESS NOTES
[facility] NEUROLOGY NOTE    Patient: Emmett Zheng Jr.  Physician: Merritt Wu MD    CC:   Chief Complaint   Patient presents with    Other     New patient here for short term memory issues. Pt wife reports onset was 8 months ago and has worsened. Pt wife reports  is becoming paranoid.      PCP: Emmett Dorado Jr., MD  Referred by: Emmett Dorado Jr., *     History of Present Illness:     Emmett Zheng Jr. is a 79 y.o. right-handed male with PMH noted below, presents for evaluation of dementia accompanied by his wife who is a retired pharmacist. First notable symptoms were short-term memory deficits, asking repetitive questions.  He is talkative and answers most questions on his own, some sense of humor with answers.  There are some notable deficits on MoCA 15 out of 30 today.  0 out of 5 word recall.  1 out of 5 visuospatial/executive function.  Unable to draw clock numbers or hands.  Difficulty solving puzzles, difficulty with serial 7 subtractions.  Abstract reasoning is intact.  Reviewed MRI brain imaging from a year ago with very mild white matter changes, certainly not consistent with vascular dementia.  No evidence of strokes, however there is significant bilateral hippocampal atrophy visualized on MRI.  Brother is 2 years younger than him and was diagnosed with Alzheimer dementia, late onset.  His mother had dementia very late in life in her 80s.  Highest level of education: College  Occupation: Used to own a company, gave control over to employee  Retired? Yes  Driving? Yes  Level of functioning: Normal ADLs, wife reminds him about appointments and medications  Living situation: safe  Family history: early onset dementia (<65 years old) brother diagnosed with AD (~62 y/o)    Pertinent positive signs and symptoms: Patient does NOT having the following:   Memory loss: forgetting recent events and details, misplacing personal items, asking repetitive questions, and missing

## 2025-01-22 ENCOUNTER — TELEPHONE (OUTPATIENT)
Dept: NEUROLOGY | Age: 80
End: 2025-01-22

## 2025-01-22 LAB — VIT B12 SERPL-MCNC: 596 PG/ML (ref 232–1245)

## 2025-01-22 NOTE — TELEPHONE ENCOUNTER
Called pt to explain Namenda was not prescribed it might be added after the 6 month follow up per  last office note

## 2025-01-23 ENCOUNTER — OFFICE VISIT (OUTPATIENT)
Age: 80
End: 2025-01-23
Payer: MEDICARE

## 2025-01-23 VITALS
SYSTOLIC BLOOD PRESSURE: 122 MMHG | WEIGHT: 225.6 LBS | DIASTOLIC BLOOD PRESSURE: 64 MMHG | HEART RATE: 59 BPM | HEIGHT: 69 IN | BODY MASS INDEX: 33.41 KG/M2

## 2025-01-23 DIAGNOSIS — I10 PRIMARY HYPERTENSION: ICD-10-CM

## 2025-01-23 DIAGNOSIS — I25.810 CORONARY ARTERY DISEASE INVOLVING CORONARY BYPASS GRAFT OF NATIVE HEART WITHOUT ANGINA PECTORIS: Primary | ICD-10-CM

## 2025-01-23 LAB — METHYLMALONATE SERPL-SCNC: 209 NMOL/L (ref 0–378)

## 2025-01-23 PROCEDURE — G8417 CALC BMI ABV UP PARAM F/U: HCPCS | Performed by: INTERNAL MEDICINE

## 2025-01-23 PROCEDURE — 1123F ACP DISCUSS/DSCN MKR DOCD: CPT | Performed by: INTERNAL MEDICINE

## 2025-01-23 PROCEDURE — 3074F SYST BP LT 130 MM HG: CPT | Performed by: INTERNAL MEDICINE

## 2025-01-23 PROCEDURE — 3078F DIAST BP <80 MM HG: CPT | Performed by: INTERNAL MEDICINE

## 2025-01-23 PROCEDURE — 1036F TOBACCO NON-USER: CPT | Performed by: INTERNAL MEDICINE

## 2025-01-23 PROCEDURE — 1160F RVW MEDS BY RX/DR IN RCRD: CPT | Performed by: INTERNAL MEDICINE

## 2025-01-23 PROCEDURE — 93000 ELECTROCARDIOGRAM COMPLETE: CPT | Performed by: INTERNAL MEDICINE

## 2025-01-23 PROCEDURE — G8427 DOCREV CUR MEDS BY ELIG CLIN: HCPCS | Performed by: INTERNAL MEDICINE

## 2025-01-23 PROCEDURE — 1159F MED LIST DOCD IN RCRD: CPT | Performed by: INTERNAL MEDICINE

## 2025-01-23 PROCEDURE — 99214 OFFICE O/P EST MOD 30 MIN: CPT | Performed by: INTERNAL MEDICINE

## 2025-01-23 PROCEDURE — 1126F AMNT PAIN NOTED NONE PRSNT: CPT | Performed by: INTERNAL MEDICINE

## 2025-01-23 ASSESSMENT — ENCOUNTER SYMPTOMS
PHOTOPHOBIA: 0
CHEST TIGHTNESS: 0
EYES NEGATIVE: 1
BACK PAIN: 0
EYE PAIN: 0
GASTROINTESTINAL NEGATIVE: 1
SHORTNESS OF BREATH: 0
ABDOMINAL PAIN: 0
ALLERGIC/IMMUNOLOGIC NEGATIVE: 1
RESPIRATORY NEGATIVE: 1

## 2025-01-23 NOTE — PROGRESS NOTES
as needed (lasix use) (Patient not taking: Reported on 1/21/2025) 90 tablet 3    aspirin 81 MG EC tablet Take 1 tablet by mouth daily (Patient not taking: Reported on 1/21/2025)      ascorbic acid (VITAMIN C) 500 MG tablet Take 2 tablets by mouth (Patient not taking: Reported on 1/21/2025)       No current facility-administered medications for this visit.       Lab Results   Component Value Date/Time    CHOL 205 07/26/2024 11:54 AM    HDL 55 07/26/2024 11:54 AM     07/26/2024 11:54 AM    .2 07/20/2023 08:49 AM    VLDL 44 07/26/2024 11:54 AM       ASSESSMENT and PLAN    ICD-10-CM    1. Coronary artery disease involving coronary bypass graft of native heart without angina pectoris  I25.810 EKG 12 Lead      2. Primary hypertension  I10 EKG 12 Lead          IMPRESSION:  A/P  1) CAD -asked him to resume taking aspirin 81 mg daily  2) HTN -controlled with changes to diet and lifestyle  3) HLD -on maximum tolerated therapy with a PSK 9 inhibitor.  I have ordered Repatha today  4) paroxysmal atrial fibrillation no further episodes noted.            ALL ORDERS THIS ENCOUNTER  Orders Placed This Encounter    EKG 12 Lead     Order Specific Question:   Reason for Exam?     Answer:   Other    Probiotic Product (PROBIOTIC BLEND PO)     Sig: Take by mouth        Follow up in 6 months.     Thank you for allowing me to participate in this patient's care.  Please call or contact me if there are any questions or concerns regarding the above.      Francisco Clay MD  01/23/25  11:36 AM

## 2025-01-24 LAB — VIT B6 SERPL-MCNC: 23.3 UG/L (ref 3.4–65.2)

## 2025-01-25 LAB — P-TAU181: 2.72 PG/ML (ref 0–0.97)

## 2025-02-10 ENCOUNTER — OFFICE VISIT (OUTPATIENT)
Dept: VASCULAR SURGERY | Age: 80
End: 2025-02-10
Payer: MEDICARE

## 2025-02-10 VITALS
SYSTOLIC BLOOD PRESSURE: 131 MMHG | HEART RATE: 71 BPM | HEIGHT: 69 IN | BODY MASS INDEX: 33.33 KG/M2 | OXYGEN SATURATION: 95 % | DIASTOLIC BLOOD PRESSURE: 70 MMHG | WEIGHT: 225 LBS

## 2025-02-10 DIAGNOSIS — I65.23 BILATERAL CAROTID ARTERY STENOSIS: Primary | ICD-10-CM

## 2025-02-10 DIAGNOSIS — I73.9 PAD (PERIPHERAL ARTERY DISEASE) (HCC): ICD-10-CM

## 2025-02-10 PROCEDURE — 3078F DIAST BP <80 MM HG: CPT | Performed by: STUDENT IN AN ORGANIZED HEALTH CARE EDUCATION/TRAINING PROGRAM

## 2025-02-10 PROCEDURE — G8427 DOCREV CUR MEDS BY ELIG CLIN: HCPCS | Performed by: STUDENT IN AN ORGANIZED HEALTH CARE EDUCATION/TRAINING PROGRAM

## 2025-02-10 PROCEDURE — 3075F SYST BP GE 130 - 139MM HG: CPT | Performed by: STUDENT IN AN ORGANIZED HEALTH CARE EDUCATION/TRAINING PROGRAM

## 2025-02-10 PROCEDURE — 1159F MED LIST DOCD IN RCRD: CPT | Performed by: STUDENT IN AN ORGANIZED HEALTH CARE EDUCATION/TRAINING PROGRAM

## 2025-02-10 PROCEDURE — 99213 OFFICE O/P EST LOW 20 MIN: CPT | Performed by: STUDENT IN AN ORGANIZED HEALTH CARE EDUCATION/TRAINING PROGRAM

## 2025-02-10 PROCEDURE — 1036F TOBACCO NON-USER: CPT | Performed by: STUDENT IN AN ORGANIZED HEALTH CARE EDUCATION/TRAINING PROGRAM

## 2025-02-10 PROCEDURE — G8417 CALC BMI ABV UP PARAM F/U: HCPCS | Performed by: STUDENT IN AN ORGANIZED HEALTH CARE EDUCATION/TRAINING PROGRAM

## 2025-02-10 PROCEDURE — 1123F ACP DISCUSS/DSCN MKR DOCD: CPT | Performed by: STUDENT IN AN ORGANIZED HEALTH CARE EDUCATION/TRAINING PROGRAM

## 2025-02-10 NOTE — PROGRESS NOTES
CANCER SCREENING, NOT HIGH RISK performed by Brennan Stewart MD at [facility]    CORONARY ANGIOPLASTY WITH STENT PLACEMENT  2019    patient thinks 4 total stents; last in 2019=Successful angioplasty and stenting of the vein graft to the RCA.    INGUINAL HERNIA REPAIR Bilateral 9/15/2022    LAPAROSCOPIC BILATERAL INGUINAL HERNIA REPAIR WITH MESH performed by Francisco Acuna MD at [facility] [place]    LAPAROTOMY N/A 2022    LAPAROTOMY EXPLORATORY, REVISION OF MESH, LYSIS OF ADHESIONS performed by Francisco Acuna MD at [facility] [place]    ORTHOPEDIC SURGERY      right shoulder, left knee, and left foot    SINUS SURGERY      TESTICLE SURGERY      UMBILICAL HERNIA REPAIR N/A 9/15/2022    HERNIA UMBILICAL REPAIR performed by Francisco Acuna MD at [facility] [place]    UPPER GASTROINTESTINAL ENDOSCOPY N/A 2023    EGD BIOPSY performed by Brennan Stewart MD at [facility]    VASECTOMY       Metal implants or AICD: no    Dye allergy: no     Smoker:  Tobacco Use      Smoking status: Former        Packs/day: 0.00        Types: Cigarettes        Quit date: 1970        Years since quittin.7      Smokeless tobacco: Former      Tobacco comments: Quit smoking: chews tobacco      Referred by: No ref. provider found    PCP:Emmett Dorado Jr., MD Jeffrey Thomas Johnston, MD    Elements of this note have been dictated using speech recognition software. As a result, errors of speech recognition may have occurred.

## 2025-02-16 ENCOUNTER — HOSPITAL ENCOUNTER (EMERGENCY)
Age: 80
Discharge: HOME | End: 2025-02-16
Attending: EMERGENCY MEDICINE
Payer: MEDICARE

## 2025-02-16 VITALS
HEIGHT: 69 IN | HEART RATE: 71 BPM | WEIGHT: 225 LBS | SYSTOLIC BLOOD PRESSURE: 128 MMHG | DIASTOLIC BLOOD PRESSURE: 66 MMHG | BODY MASS INDEX: 33.33 KG/M2 | OXYGEN SATURATION: 97 % | TEMPERATURE: 98.3 F | RESPIRATION RATE: 18 BRPM

## 2025-02-16 DIAGNOSIS — K92.1 MELENA: Primary | ICD-10-CM

## 2025-02-16 DIAGNOSIS — R19.7 DIARRHEA, UNSPECIFIED TYPE: ICD-10-CM

## 2025-02-16 PROCEDURE — 99283 EMERGENCY DEPT VISIT LOW MDM: CPT

## 2025-02-16 ASSESSMENT — PAIN - FUNCTIONAL ASSESSMENT: PAIN_FUNCTIONAL_ASSESSMENT: NONE - DENIES PAIN

## 2025-02-16 ASSESSMENT — LIFESTYLE VARIABLES
HOW MANY STANDARD DRINKS CONTAINING ALCOHOL DO YOU HAVE ON A TYPICAL DAY: 1 OR 2
HOW OFTEN DO YOU HAVE A DRINK CONTAINING ALCOHOL: MONTHLY OR LESS

## 2025-02-16 NOTE — DISCHARGE INSTRUCTIONS
Return with stool sample for culture to check for fungi.  Referral to gastroenterology because of your bloating in your abdomen and diarrhea.  Consider over-the-counter Imodium A-D.  Recheck for fever bleeding

## 2025-02-16 NOTE — ED PROVIDER NOTES
Emergency Department Provider Note       PCP: Emmett Dorado Jr., MD   Age: 79 y.o.   Sex: male     DISPOSITION Decision To Discharge 02/16/2025 01:06:07 PM    ICD-10-CM    1. [place]  K92.1 [facility] [place]     MISCELLANEOUS SENDOUT Stool for fungal culture      2. Diarrhea, unspecified type  R19.7 [facility] [place]     MISCELLANEOUS SENDOUT Stool for fungal culture          Medical Decision Making   Patient with some dark-colored diarrhea.  I would doubt mold.  Will get stool culture.  Will give referral to gastroenterology.  Most likely viral syndrome.  Also refer back to primary care doctor.  No emergency medical condition identified.  Patient without immunosuppression.  Has not been on antibiotics recently       1 or more acute illnesses that pose a threat to life or bodily function.   Chronic medical problems impacting care include coronary artery disease and cognitive decline.  I independently ordered and reviewed each unique test.    I reviewed external records: provider visit note from outside specialist.   Previous surgery regarding hernia repair with mesh.      History     79-year-old male arrives by private vehicle.  In reviewing his records, history coronary disease with bypass grafting and atrial fibrillation.  Does have some stents as well.  There is also some peripheral vascular disease.  There is some mild visits to a doctor for cognitive decline as well.  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  States he started up some diarrhea 24 to 48 hours ago.  And states with the diarrhea there is some black spots in his stool that he thinks is mold.  Thinks it is black mold.  Denies fever and chills.  States some slight abdominal distention and discomfort.  He states has actually been going on 2 years since his hernia surgery.  Denies any bleeding his

## 2025-02-16 NOTE — ED TRIAGE NOTES
Patient with c/o \"dealing with a lot of mold de contamination\" and patient stated he is seeing \"black mold in my stool\"     Denies any symptoms

## 2025-02-17 ENCOUNTER — TELEPHONE (OUTPATIENT)
Dept: NEUROLOGY | Age: 80
End: 2025-02-17

## 2025-02-17 ENCOUNTER — CARE COORDINATION (OUTPATIENT)
Dept: CARE COORDINATION | Facility: CLINIC | Age: 80
End: 2025-02-17

## 2025-02-17 DIAGNOSIS — R19.7 DIARRHEA, UNSPECIFIED TYPE: ICD-10-CM

## 2025-02-17 DIAGNOSIS — K92.1 MELENA: ICD-10-CM

## 2025-02-17 NOTE — CARE COORDINATION
Ambulatory Care Coordination Note     2/17/2025 8:46 AM     ACM outreach attempt by this ACM today to offer care management services. ACM was unable to reach the patient by telephone today;   left voice message requesting a return phone call to this ACM.     ACM: Belen Andre RN     Care Summary Note: left message    PCP/Specialist follow up:   Future Appointments         Provider Specialty Dept Phone    7/22/2025 3:30 PM Merritt Wu MD Neurology [phone]    7/24/2025 10:30 AM Francisco Clay MD Cardiology [phone]            Follow Up:   Plan for next ACM outreach in approximately 1-2 days  to complete:  - outreach attempt to offer care management services.

## 2025-02-17 NOTE — TELEPHONE ENCOUNTER
Pt wife reports since  has started taking ARICEPT  his memory and delusions have worsened. Should I inform pt to stop taking the mediation? And is there a substitute pt can try?

## 2025-02-18 ENCOUNTER — CARE COORDINATION (OUTPATIENT)
Dept: CARE COORDINATION | Facility: CLINIC | Age: 80
End: 2025-02-18

## 2025-02-18 NOTE — CARE COORDINATION
Ambulatory Care Coordination Note     2025 8:07 AM     Patient Current Location:  [place]     This patient was received as a referral from Ambulatory Care Manager .    ACM contacted the patient by telephone. Verified name and  with patient as identifiers. Provided introduction to self, and explanation of the ACM role.   Patient declined care management services at this time.

## 2025-02-20 LAB
FUNGAL CULT/SMEAR: NORMAL
FUNGUS SMEAR: NORMAL
SPECIMEN PROCESSING: NORMAL
SPECIMEN SOURCE: NORMAL
SPECIMEN SOURCE: NORMAL

## 2025-02-24 LAB
FUNGUS SMEAR: NORMAL
SPECIMEN SOURCE: NORMAL

## 2025-02-25 ENCOUNTER — TELEPHONE (OUTPATIENT)
Dept: NEUROLOGY | Age: 80
End: 2025-02-25

## 2025-02-25 NOTE — TELEPHONE ENCOUNTER
Pt wife reports he has come off ARICEPT for about 2 weeks and his symptoms have worsened he is very agitated and uncontrollable. Wife is hoping we can try a different medication

## 2025-02-27 ENCOUNTER — PATIENT MESSAGE (OUTPATIENT)
Dept: NEUROLOGY | Age: 80
End: 2025-02-27

## 2025-02-27 NOTE — TELEPHONE ENCOUNTER
Per Dr. FRANCIOS pt will try 5mg of ARICEPT with dinner. Wife was agreeable to reach out to PCP for Psych referral to treat his mood disturbances.

## 2025-03-13 ENCOUNTER — TELEPHONE (OUTPATIENT)
Dept: FAMILY MEDICINE CLINIC | Facility: CLINIC | Age: 80
End: 2025-03-13
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
## 2025-03-17 ENCOUNTER — PATIENT MESSAGE (OUTPATIENT)
Dept: FAMILY MEDICINE CLINIC | Facility: CLINIC | Age: 80
End: 2025-03-17

## 2025-03-17 RX ORDER — QUETIAPINE FUMARATE 25 MG/1
TABLET, FILM COATED ORAL
Qty: 60 TABLET | Refills: 0 | Status: SHIPPED | OUTPATIENT
Start: 2025-03-17

## 2025-03-19 LAB
FUNGAL CULT/SMEAR: NORMAL
FUNGUS (MYCOLOGY) CULTURE: NEGATIVE
FUNGUS SMEAR: NORMAL
REFLEX TO ID: NORMAL
SPECIMEN PROCESSING: NORMAL
SPECIMEN SOURCE: NORMAL
SPECIMEN SOURCE: NORMAL

## 2025-03-28 ENCOUNTER — PATIENT MESSAGE (OUTPATIENT)
Dept: FAMILY MEDICINE CLINIC | Facility: CLINIC | Age: 80
End: 2025-03-28

## 2025-03-28 RX ORDER — QUETIAPINE FUMARATE 25 MG/1
TABLET, FILM COATED ORAL
Qty: 60 TABLET | Refills: 0 | Status: SHIPPED | OUTPATIENT
Start: 2025-03-28

## 2025-04-15 ENCOUNTER — OFFICE VISIT (OUTPATIENT)
Dept: FAMILY MEDICINE CLINIC | Facility: CLINIC | Age: 80
End: 2025-04-15
Payer: MEDICARE

## 2025-04-15 ENCOUNTER — RESULTS FOLLOW-UP (OUTPATIENT)
Dept: FAMILY MEDICINE CLINIC | Facility: CLINIC | Age: 80
End: 2025-04-15

## 2025-04-15 VITALS
OXYGEN SATURATION: 94 % | BODY MASS INDEX: 32.81 KG/M2 | WEIGHT: 222.2 LBS | DIASTOLIC BLOOD PRESSURE: 78 MMHG | TEMPERATURE: 97.2 F | HEART RATE: 69 BPM | SYSTOLIC BLOOD PRESSURE: 130 MMHG

## 2025-04-15 DIAGNOSIS — K82.8 BILIARY DYSKINESIA: Primary | ICD-10-CM

## 2025-04-15 DIAGNOSIS — F32.A ANXIETY AND DEPRESSION: ICD-10-CM

## 2025-04-15 DIAGNOSIS — R10.84 GENERALIZED ABDOMINAL PAIN: Primary | ICD-10-CM

## 2025-04-15 DIAGNOSIS — M25.561 CHRONIC PAIN OF BOTH KNEES: ICD-10-CM

## 2025-04-15 DIAGNOSIS — K92.89 GAS BLOAT SYNDROME: ICD-10-CM

## 2025-04-15 DIAGNOSIS — M25.562 CHRONIC PAIN OF BOTH KNEES: ICD-10-CM

## 2025-04-15 DIAGNOSIS — R53.83 FATIGUE, UNSPECIFIED TYPE: ICD-10-CM

## 2025-04-15 DIAGNOSIS — R39.89 URINE DISCOLORATION: ICD-10-CM

## 2025-04-15 DIAGNOSIS — Z12.5 SCREENING FOR PROSTATE CANCER: ICD-10-CM

## 2025-04-15 DIAGNOSIS — G47.00 INSOMNIA, UNSPECIFIED TYPE: ICD-10-CM

## 2025-04-15 DIAGNOSIS — F09 COGNITIVE DYSFUNCTION: ICD-10-CM

## 2025-04-15 DIAGNOSIS — F41.9 ANXIETY AND DEPRESSION: ICD-10-CM

## 2025-04-15 DIAGNOSIS — G89.29 CHRONIC PAIN OF BOTH KNEES: ICD-10-CM

## 2025-04-15 PROBLEM — N18.30 STAGE 3 CHRONIC KIDNEY DISEASE, UNSPECIFIED WHETHER STAGE 3A OR 3B CKD (HCC): Status: RESOLVED | Noted: 2023-07-03 | Resolved: 2025-04-15

## 2025-04-15 LAB
ALBUMIN SERPL-MCNC: 4 G/DL (ref 3.2–4.6)
ALBUMIN/GLOB SERPL: 1.6 (ref 1–1.9)
ALP SERPL-CCNC: 44 U/L (ref 40–129)
ALT SERPL-CCNC: 18 U/L (ref 8–55)
ANION GAP SERPL CALC-SCNC: 11 MMOL/L (ref 7–16)
APPEARANCE UR: CLEAR
AST SERPL-CCNC: 21 U/L (ref 15–37)
BASOPHILS # BLD: 0.07 K/UL (ref 0–0.2)
BASOPHILS NFR BLD: 0.9 % (ref 0–2)
BILIRUB SERPL-MCNC: 0.5 MG/DL (ref 0–1.2)
BILIRUB UR QL: NEGATIVE
BUN SERPL-MCNC: 15 MG/DL (ref 8–23)
CALCIUM SERPL-MCNC: 9.7 MG/DL (ref 8.8–10.2)
CHLORIDE SERPL-SCNC: 106 MMOL/L (ref 98–107)
CO2 SERPL-SCNC: 26 MMOL/L (ref 20–29)
COLOR UR: NORMAL
CREAT SERPL-MCNC: 1.24 MG/DL (ref 0.8–1.3)
DIFFERENTIAL METHOD BLD: NORMAL
EOSINOPHIL # BLD: 0.3 K/UL (ref 0–0.8)
EOSINOPHIL NFR BLD: 3.7 % (ref 0.5–7.8)
ERYTHROCYTE [DISTWIDTH] IN BLOOD BY AUTOMATED COUNT: 13.8 % (ref 11.9–14.6)
GLOBULIN SER CALC-MCNC: 2.5 G/DL (ref 2.3–3.5)
GLUCOSE SERPL-MCNC: 77 MG/DL (ref 70–99)
GLUCOSE UR STRIP.AUTO-MCNC: NEGATIVE MG/DL
HCT VFR BLD AUTO: 46.3 % (ref 41.1–50.3)
HGB BLD-MCNC: 14.8 G/DL (ref 13.6–17.2)
HGB UR QL STRIP: NEGATIVE
IMM GRANULOCYTES # BLD AUTO: 0.03 K/UL (ref 0–0.5)
IMM GRANULOCYTES NFR BLD AUTO: 0.4 % (ref 0–5)
KETONES UR QL STRIP.AUTO: NEGATIVE MG/DL
LEUKOCYTE ESTERASE UR QL STRIP.AUTO: NEGATIVE
LYMPHOCYTES # BLD: 2.71 K/UL (ref 0.5–4.6)
LYMPHOCYTES NFR BLD: 33.8 % (ref 13–44)
MCH RBC QN AUTO: 28.1 PG (ref 26.1–32.9)
MCHC RBC AUTO-ENTMCNC: 32 G/DL (ref 31.4–35)
MCV RBC AUTO: 87.9 FL (ref 82–102)
MONOCYTES # BLD: 0.9 K/UL (ref 0.1–1.3)
MONOCYTES NFR BLD: 11.2 % (ref 4–12)
NEUTS SEG # BLD: 4.01 K/UL (ref 1.7–8.2)
NEUTS SEG NFR BLD: 50 % (ref 43–78)
NITRITE UR QL STRIP.AUTO: NEGATIVE
NRBC # BLD: 0 K/UL (ref 0–0.2)
PH UR STRIP: 7.5 (ref 5–9)
PLATELET # BLD AUTO: 267 K/UL (ref 150–450)
PMV BLD AUTO: 9.8 FL (ref 9.4–12.3)
POTASSIUM SERPL-SCNC: 4.7 MMOL/L (ref 3.5–5.1)
PROT SERPL-MCNC: 6.5 G/DL (ref 6.3–8.2)
PROT UR STRIP-MCNC: NEGATIVE MG/DL
PSA SERPL-MCNC: 1.9 NG/ML (ref 0–4)
RBC # BLD AUTO: 5.27 M/UL (ref 4.23–5.6)
SODIUM SERPL-SCNC: 142 MMOL/L (ref 136–145)
SP GR UR REFRACTOMETRY: 1.02 (ref 1–1.02)
TSH, 3RD GENERATION: 1.84 UIU/ML (ref 0.27–4.2)
UROBILINOGEN UR QL STRIP.AUTO: 0.2 EU/DL (ref 0.2–1)
WBC # BLD AUTO: 8 K/UL (ref 4.3–11.1)

## 2025-04-15 PROCEDURE — 3075F SYST BP GE 130 - 139MM HG: CPT | Performed by: FAMILY MEDICINE

## 2025-04-15 PROCEDURE — 99214 OFFICE O/P EST MOD 30 MIN: CPT | Performed by: FAMILY MEDICINE

## 2025-04-15 PROCEDURE — G8427 DOCREV CUR MEDS BY ELIG CLIN: HCPCS | Performed by: FAMILY MEDICINE

## 2025-04-15 PROCEDURE — G8417 CALC BMI ABV UP PARAM F/U: HCPCS | Performed by: FAMILY MEDICINE

## 2025-04-15 PROCEDURE — 1123F ACP DISCUSS/DSCN MKR DOCD: CPT | Performed by: FAMILY MEDICINE

## 2025-04-15 PROCEDURE — 3078F DIAST BP <80 MM HG: CPT | Performed by: FAMILY MEDICINE

## 2025-04-15 PROCEDURE — 1159F MED LIST DOCD IN RCRD: CPT | Performed by: FAMILY MEDICINE

## 2025-04-15 PROCEDURE — 1036F TOBACCO NON-USER: CPT | Performed by: FAMILY MEDICINE

## 2025-04-15 RX ORDER — CELECOXIB 200 MG/1
200 CAPSULE ORAL DAILY
Qty: 90 CAPSULE | Refills: 3 | Status: SHIPPED | OUTPATIENT
Start: 2025-04-15

## 2025-04-15 RX ORDER — KETOCONAZOLE 20 MG/G
CREAM TOPICAL
Qty: 30 G | Refills: 1 | Status: SHIPPED | OUTPATIENT
Start: 2025-04-15

## 2025-04-15 RX ORDER — QUETIAPINE FUMARATE 100 MG/1
TABLET, FILM COATED ORAL
Qty: 60 TABLET | Refills: 3 | Status: SHIPPED | OUTPATIENT
Start: 2025-04-15

## 2025-04-15 SDOH — ECONOMIC STABILITY: FOOD INSECURITY: WITHIN THE PAST 12 MONTHS, THE FOOD YOU BOUGHT JUST DIDN'T LAST AND YOU DIDN'T HAVE MONEY TO GET MORE.: NEVER TRUE

## 2025-04-15 SDOH — ECONOMIC STABILITY: FOOD INSECURITY: WITHIN THE PAST 12 MONTHS, YOU WORRIED THAT YOUR FOOD WOULD RUN OUT BEFORE YOU GOT MONEY TO BUY MORE.: NEVER TRUE

## 2025-04-15 ASSESSMENT — PATIENT HEALTH QUESTIONNAIRE - PHQ9
10. IF YOU CHECKED OFF ANY PROBLEMS, HOW DIFFICULT HAVE THESE PROBLEMS MADE IT FOR YOU TO DO YOUR WORK, TAKE CARE OF THINGS AT HOME, OR GET ALONG WITH OTHER PEOPLE: NOT DIFFICULT AT ALL
SUM OF ALL RESPONSES TO PHQ QUESTIONS 1-9: 0
3. TROUBLE FALLING OR STAYING ASLEEP: NOT AT ALL
9. THOUGHTS THAT YOU WOULD BE BETTER OFF DEAD, OR OF HURTING YOURSELF: NOT AT ALL
7. TROUBLE CONCENTRATING ON THINGS, SUCH AS READING THE NEWSPAPER OR WATCHING TELEVISION: NOT AT ALL
5. POOR APPETITE OR OVEREATING: NOT AT ALL
6. FEELING BAD ABOUT YOURSELF - OR THAT YOU ARE A FAILURE OR HAVE LET YOURSELF OR YOUR FAMILY DOWN: NOT AT ALL
8. MOVING OR SPEAKING SO SLOWLY THAT OTHER PEOPLE COULD HAVE NOTICED. OR THE OPPOSITE, BEING SO FIGETY OR RESTLESS THAT YOU HAVE BEEN MOVING AROUND A LOT MORE THAN USUAL: NOT AT ALL
SUM OF ALL RESPONSES TO PHQ QUESTIONS 1-9: 0
4. FEELING TIRED OR HAVING LITTLE ENERGY: NOT AT ALL
SUM OF ALL RESPONSES TO PHQ QUESTIONS 1-9: 0
SUM OF ALL RESPONSES TO PHQ QUESTIONS 1-9: 0
1. LITTLE INTEREST OR PLEASURE IN DOING THINGS: NOT AT ALL
2. FEELING DOWN, DEPRESSED OR HOPELESS: NOT AT ALL

## 2025-04-15 ASSESSMENT — ENCOUNTER SYMPTOMS
NAUSEA: 0
BLOOD IN STOOL: 0
CONSTIPATION: 0
VOMITING: 0
ANAL BLEEDING: 0
RECTAL PAIN: 0
ABDOMINAL DISTENTION: 1
DIARRHEA: 0
RESPIRATORY NEGATIVE: 1
ABDOMINAL PAIN: 1
EYES NEGATIVE: 1

## 2025-04-15 NOTE — PROGRESS NOTES
HISTORY OF PRESENT ILLNESS    Emmett Zheng Jr. is a 79 y.o. male.  HPI  Chief Complaint   Patient presents with    3 Month Follow-Up     Seroquel f/u     See above. History of cognitive dysfunction with some altered mental status. Wife endorses much of history. Had been having some agitation and confusion with difficulty sleeping. Has improved on Seroquel but some symptoms persist. Has follow up with neurologist in a month or two. Fatigue related to difficultyy sleeping has improved with better sleep but has not resolved. Denies exertional chest pain or SOB.  History of DJD with bilateral knee pain. No pain at rest. Occurs when standing and walking. Responded well to wife's Celebrex.  Anxiety and depression is stable on Paxil. Periodic anxiety responds to Ativan.  For 2-3 months has had abdominal bloating and increased gas. Random generalized abdominal pain. Severity is about 4-6/10. No pattern to onset. Lasts a few minutes to about and hour. Occasional constipation. Denies bloody or black stools. For the last 4-6 weeks urine has been a bright yellow/green in color. Denies frequency or burning.  Denies heartburn. No difficulty swallowing. No nausea or vomiting. Appetite is unchanged.  Decreased renal function in the past. Has not had follow up with nephrologist.      Current Outpatient Medications on File Prior to Visit   Medication Sig Dispense Refill    Evolocumab 140 MG/ML SOAJ Inject 140 mg into the skin every 14 days 6 Adjustable Dose Pre-filled Pen Syringe 3    Probiotic Product (PROBIOTIC BLEND PO) Take by mouth      donepezil (ARICEPT) 10 MG tablet Take 1 tablet by mouth nightly 90 tablet 3    LORazepam (ATIVAN) 1 MG tablet Take 1 tablet by mouth 2 times daily as needed for Anxiety for up to 180 days. Max Daily Amount: 2 mg 60 tablet 5    PARoxetine (PAXIL) 30 MG tablet Take 1 tablet by mouth daily 90 tablet 3    nitroGLYCERIN (NITROSTAT) 0.4 MG SL tablet Place 1 tablet under the tongue every 5 minutes

## 2025-04-25 ENCOUNTER — APPOINTMENT (OUTPATIENT)
Dept: CT IMAGING | Age: 80
End: 2025-04-25
Payer: MEDICARE

## 2025-04-25 ENCOUNTER — APPOINTMENT (OUTPATIENT)
Dept: GENERAL RADIOLOGY | Age: 80
End: 2025-04-25
Payer: MEDICARE

## 2025-04-25 ENCOUNTER — HOSPITAL ENCOUNTER (EMERGENCY)
Age: 80
Discharge: HOME | End: 2025-04-25
Attending: EMERGENCY MEDICINE
Payer: MEDICARE

## 2025-04-25 VITALS
SYSTOLIC BLOOD PRESSURE: 128 MMHG | RESPIRATION RATE: 18 BRPM | OXYGEN SATURATION: 95 % | HEART RATE: 107 BPM | TEMPERATURE: 97.3 F | DIASTOLIC BLOOD PRESSURE: 73 MMHG

## 2025-04-25 DIAGNOSIS — J18.1 LEFT LOWER LOBE CONSOLIDATION: ICD-10-CM

## 2025-04-25 DIAGNOSIS — R05.8 PRODUCTIVE COUGH: Primary | ICD-10-CM

## 2025-04-25 DIAGNOSIS — G47.30 SLEEP APNEA, UNSPECIFIED TYPE: ICD-10-CM

## 2025-04-25 PROBLEM — K40.21 BILATERAL RECURRENT INGUINAL HERNIA WITHOUT OBSTRUCTION OR GANGRENE: Status: ACTIVE | Noted: 2023-12-12

## 2025-04-25 PROBLEM — H53.021 REFRACTIVE AMBLYOPIA OF RIGHT EYE: Status: ACTIVE | Noted: 2021-01-11

## 2025-04-25 PROBLEM — H25.813 COMBINED FORMS OF AGE-RELATED CATARACT OF BOTH EYES: Status: ACTIVE | Noted: 2021-01-11

## 2025-04-25 PROBLEM — K42.9 RECURRENT UMBILICAL HERNIA: Status: ACTIVE | Noted: 2023-12-12

## 2025-04-25 PROBLEM — R10.32 LEFT GROIN PAIN: Status: ACTIVE | Noted: 2024-08-27

## 2025-04-25 PROBLEM — H04.123 DRY EYES: Status: ACTIVE | Noted: 2021-01-11

## 2025-04-25 PROBLEM — Z85.820 HISTORY OF MALIGNANT MELANOMA OF SKIN: Status: ACTIVE | Noted: 2025-04-25

## 2025-04-25 LAB
ALBUMIN SERPL-MCNC: 3.8 G/DL (ref 3.2–4.6)
ALBUMIN/GLOB SERPL: 1.2 (ref 1–1.9)
ALP SERPL-CCNC: 53 U/L (ref 40–129)
ALT SERPL-CCNC: 15 U/L (ref 8–55)
ANION GAP SERPL CALC-SCNC: 13 MMOL/L (ref 7–16)
AST SERPL-CCNC: 25 U/L (ref 15–37)
BASOPHILS # BLD: 0.06 K/UL (ref 0–0.2)
BASOPHILS NFR BLD: 0.8 % (ref 0–2)
BILIRUB SERPL-MCNC: 0.4 MG/DL (ref 0–1.2)
BUN SERPL-MCNC: 15 MG/DL (ref 8–23)
CALCIUM SERPL-MCNC: 9.3 MG/DL (ref 8.8–10.2)
CHLORIDE SERPL-SCNC: 103 MMOL/L (ref 98–107)
CO2 SERPL-SCNC: 25 MMOL/L (ref 20–29)
CREAT SERPL-MCNC: 1.36 MG/DL (ref 0.8–1.3)
DIFFERENTIAL METHOD BLD: ABNORMAL
EKG ATRIAL RATE: 76 BPM
EKG DIAGNOSIS: NORMAL
EKG P AXIS: 9 DEGREES
EKG P-R INTERVAL: 152 MS
EKG Q-T INTERVAL: 424 MS
EKG QRS DURATION: 136 MS
EKG QTC CALCULATION (BAZETT): 477 MS
EKG R AXIS: -71 DEGREES
EKG T AXIS: 46 DEGREES
EKG VENTRICULAR RATE: 76 BPM
EOSINOPHIL # BLD: 0.2 K/UL (ref 0–0.8)
EOSINOPHIL NFR BLD: 2.8 % (ref 0.5–7.8)
ERYTHROCYTE [DISTWIDTH] IN BLOOD BY AUTOMATED COUNT: 13.7 % (ref 11.9–14.6)
FLUAV RNA SPEC QL NAA+PROBE: NOT DETECTED
FLUBV RNA SPEC QL NAA+PROBE: NOT DETECTED
GLOBULIN SER CALC-MCNC: 3.2 G/DL (ref 2.3–3.5)
GLUCOSE SERPL-MCNC: 99 MG/DL (ref 70–99)
HCT VFR BLD AUTO: 44 % (ref 41.1–50.3)
HGB BLD-MCNC: 14.4 G/DL (ref 13.6–17.2)
IMM GRANULOCYTES # BLD AUTO: 0.03 K/UL (ref 0–0.5)
IMM GRANULOCYTES NFR BLD AUTO: 0.4 % (ref 0–5)
LYMPHOCYTES # BLD: 1.77 K/UL (ref 0.5–4.6)
LYMPHOCYTES NFR BLD: 24.3 % (ref 13–44)
MAGNESIUM SERPL-MCNC: 2.2 MG/DL (ref 1.8–2.4)
MCH RBC QN AUTO: 27.7 PG (ref 26.1–32.9)
MCHC RBC AUTO-ENTMCNC: 32.7 G/DL (ref 31.4–35)
MCV RBC AUTO: 84.6 FL (ref 82–102)
MONOCYTES # BLD: 1.52 K/UL (ref 0.1–1.3)
MONOCYTES NFR BLD: 20.9 % (ref 4–12)
NEUTS SEG # BLD: 3.69 K/UL (ref 1.7–8.2)
NEUTS SEG NFR BLD: 50.8 % (ref 43–78)
NRBC # BLD: 0 K/UL (ref 0–0.2)
PLATELET # BLD AUTO: 261 K/UL (ref 150–450)
PMV BLD AUTO: 8.7 FL (ref 9.4–12.3)
POTASSIUM SERPL-SCNC: 4.4 MMOL/L (ref 3.5–5.1)
PROT SERPL-MCNC: 7.1 G/DL (ref 6.3–8.2)
RBC # BLD AUTO: 5.2 M/UL (ref 4.23–5.6)
RSV RNA NPH QL NAA+PROBE: NOT DETECTED
SARS-COV-2 RDRP RESP QL NAA+PROBE: NOT DETECTED
SODIUM SERPL-SCNC: 141 MMOL/L (ref 136–145)
SOURCE: NORMAL
SOURCE: NORMAL
TROPONIN T SERPL HS-MCNC: 13.9 NG/L (ref 0–22)
WBC # BLD AUTO: 7.3 K/UL (ref 4.3–11.1)

## 2025-04-25 PROCEDURE — 2580000003 HC RX 258

## 2025-04-25 PROCEDURE — 6360000004 HC RX CONTRAST MEDICATION

## 2025-04-25 PROCEDURE — 71046 X-RAY EXAM CHEST 2 VIEWS: CPT

## 2025-04-25 PROCEDURE — 96365 THER/PROPH/DIAG IV INF INIT: CPT

## 2025-04-25 PROCEDURE — 94762 N-INVAS EAR/PLS OXIMTRY CONT: CPT

## 2025-04-25 PROCEDURE — 80053 COMPREHEN METABOLIC PANEL: CPT

## 2025-04-25 PROCEDURE — 96368 THER/DIAG CONCURRENT INF: CPT

## 2025-04-25 PROCEDURE — 71260 CT THORAX DX C+: CPT

## 2025-04-25 PROCEDURE — 84484 ASSAY OF TROPONIN QUANT: CPT

## 2025-04-25 PROCEDURE — 94760 N-INVAS EAR/PLS OXIMETRY 1: CPT

## 2025-04-25 PROCEDURE — 6370000000 HC RX 637 (ALT 250 FOR IP)

## 2025-04-25 PROCEDURE — 94640 AIRWAY INHALATION TREATMENT: CPT

## 2025-04-25 PROCEDURE — 83735 ASSAY OF MAGNESIUM: CPT

## 2025-04-25 PROCEDURE — 93005 ELECTROCARDIOGRAM TRACING: CPT | Performed by: EMERGENCY MEDICINE

## 2025-04-25 PROCEDURE — 99285 EMERGENCY DEPT VISIT HI MDM: CPT

## 2025-04-25 PROCEDURE — 96375 TX/PRO/DX INJ NEW DRUG ADDON: CPT

## 2025-04-25 PROCEDURE — 87636 SARSCOV2 & INF A&B AMP PRB: CPT

## 2025-04-25 PROCEDURE — 2500000003 HC RX 250 WO HCPCS

## 2025-04-25 PROCEDURE — 93010 ELECTROCARDIOGRAM REPORT: CPT | Performed by: INTERNAL MEDICINE

## 2025-04-25 PROCEDURE — 94664 DEMO&/EVAL PT USE INHALER: CPT

## 2025-04-25 PROCEDURE — 6360000002 HC RX W HCPCS

## 2025-04-25 PROCEDURE — 87634 RSV DNA/RNA AMP PROBE: CPT

## 2025-04-25 PROCEDURE — 85025 COMPLETE CBC W/AUTO DIFF WBC: CPT

## 2025-04-25 RX ORDER — AZITHROMYCIN 250 MG/1
TABLET, FILM COATED ORAL
Qty: 6 TABLET | Refills: 0 | Status: SHIPPED | OUTPATIENT
Start: 2025-04-25 | End: 2025-05-05

## 2025-04-25 RX ORDER — BENZONATATE 100 MG/1
200 CAPSULE ORAL ONCE
Status: COMPLETED | OUTPATIENT
Start: 2025-04-25 | End: 2025-04-25

## 2025-04-25 RX ORDER — PREDNISONE 20 MG/1
20 TABLET ORAL DAILY
Qty: 5 TABLET | Refills: 0 | Status: SHIPPED | OUTPATIENT
Start: 2025-04-25 | End: 2025-04-30

## 2025-04-25 RX ORDER — IPRATROPIUM BROMIDE AND ALBUTEROL SULFATE 2.5; .5 MG/3ML; MG/3ML
1 SOLUTION RESPIRATORY (INHALATION)
Status: COMPLETED | OUTPATIENT
Start: 2025-04-25 | End: 2025-04-25

## 2025-04-25 RX ORDER — 0.9 % SODIUM CHLORIDE 0.9 %
250 INTRAVENOUS SOLUTION INTRAVENOUS ONCE
Status: COMPLETED | OUTPATIENT
Start: 2025-04-25 | End: 2025-04-25

## 2025-04-25 RX ORDER — IOPAMIDOL 755 MG/ML
75 INJECTION, SOLUTION INTRAVASCULAR
Status: COMPLETED | OUTPATIENT
Start: 2025-04-25 | End: 2025-04-25

## 2025-04-25 RX ADMIN — BENZONATATE 200 MG: 100 CAPSULE ORAL at 14:53

## 2025-04-25 RX ADMIN — WATER 125 MG: 1 INJECTION INTRAMUSCULAR; INTRAVENOUS; SUBCUTANEOUS at 14:00

## 2025-04-25 RX ADMIN — IOPAMIDOL 75 ML: 755 INJECTION, SOLUTION INTRAVENOUS at 15:28

## 2025-04-25 RX ADMIN — AZITHROMYCIN MONOHYDRATE 500 MG: 500 INJECTION, POWDER, LYOPHILIZED, FOR SOLUTION INTRAVENOUS at 18:00

## 2025-04-25 RX ADMIN — SODIUM CHLORIDE 250 ML: 9 INJECTION, SOLUTION INTRAVENOUS at 14:53

## 2025-04-25 RX ADMIN — IPRATROPIUM BROMIDE AND ALBUTEROL SULFATE 1 DOSE: 2.5; .5 SOLUTION RESPIRATORY (INHALATION) at 13:37

## 2025-04-25 RX ADMIN — CEFEPIME 2000 MG: 2 INJECTION, POWDER, FOR SOLUTION INTRAVENOUS at 18:01

## 2025-04-25 ASSESSMENT — PAIN - FUNCTIONAL ASSESSMENT: PAIN_FUNCTIONAL_ASSESSMENT: NONE - DENIES PAIN

## 2025-04-25 NOTE — ED TRIAGE NOTES
Pt arrives via POV c/o chest congestion, SOB, that started a few days ago. +fever at home per family

## 2025-04-25 NOTE — DISCHARGE INSTRUCTIONS
You were seen in the emergency department today for upper respiratory symptoms and concern for possible pneumonia    Overall your blood work is reassuring.  White count is normal.  Kidney function looks fine.  Chest x-ray came back clear however you had an episode of low oxygen apparently while you were sleeping.  This is likely secondary to what is called sleep apnea.  You will need a formal sleep study to further evaluate this.    CT of your chest is without evidence of a blood clot in your chest.  You do have what looks like possible developing pneumonia in your left lower lobe    Cardiac enzyme is within normal limit    You were given some IV antibiotics here today you were given azithromycin and some cefepime    You have opted to not be admitted today.  I think this is reasonable as long as you get prompt follow-up.    We are giving you a pulse oximeter.  If oxygen levels below 90 you need to come back to the emergency department.    Recommend sleeping with your head elevated or propped up    I sent a message to your primary care provider through the charting system requesting that you get follow-up and referral to a sleep study.    Please return to the emergency department for fevers that do not reduce with Tylenol or Motrin, worsening wheezing, oxygen levels that are dropping below 91% most definitely below 90%, general worsening of your condition    I do recommend over-the-counter medications with DM in it    I thank you karsten for being patient with us today

## 2025-04-25 NOTE — ED PROVIDER NOTES
paperwork as well as needing a referral for sleep study.  Patient and wife are in agreement with this plan.    Discharged home in stable condition.  ED Course as of 04/25/25 2006 Fri Apr 25, 2025   1328 Magnesium within normal limits at 2.2 [JG]   1328 CMP without significant electrolyte derangements, no sign of ROBERTO creatinine is 1.36 and GFR is 54.  Patient does have chronic kidney disease [JG]   1330 No elevation of bilirubin ALT AST or alk phos [JG]   1330 CBC is without leukocytosis or leukopenia, no anemia, no thrombocytopenia, no left [JG]   1330 XR chest:FINDINGS: The lungs are clear. There are no infiltrates or effusions.  The heart  size is normal. There are median sternotomy wires. No acute osseous  abnormalities are evident.     IMPRESSION:  No acute findings in the chest   [JG]   1441 Patient's O2 saturation dropped to 85% with a good Pleth.  While sleeping--Placed on 2 L nasal cannula [JG]   1615 RSV by NAAT: Not detected [JG]   1615 Influenza B, SRINIVAS: Not detected [JG]   1615 Influenza A, SRINIVAS: Not detected [JG]   1615 SARS-CoV-2, Rapid: Not detected [JG]   1615 Troponin T: 13.9 [JG]   1726 Patient ambulated and O2 saturation 96, 97 and 98 without any shortness of breath [JG]   1736 CT chest PE protocolFINDINGS:  STUDY QUALITY: The exam study quality is good.     AIRWAYS: The central airways are patent.     LUNGS: Minimal consolidative airspace opacities at the left base as well as in  the lingula..  No suspicious pulmonary nodules.     PLEURA: No pleural effusion or pneumothorax.      HEART: The heart is not enlarged. Severe calcified coronary atherosclerosis.  No  pericardial effusion.      THORACIC AORTA: The aorta is normal in caliber.      PULMONARY ARTERY: No pulmonary embolus to the segmental level. The main  pulmonary artery is normal in caliber.     MEDIASTINUM/SRIDEVI: No mediastinal mass or lymphadenopathy.     CHEST WALL: No mass or axillary lymphadenopathy.      UPPER ABDOMEN: The visualized  upper abdomen is unremarkable. Prominent hepatic  cyst is seen.     BONES: No suspicious osseous lesion.         IMPRESSION:  1.  No pulmonary embolus.  2.  Minimal consolidative airspace opacities at the left base as well as in the  lingula. Correlate for pneumonia.   [JG]      ED Course User Index  [JG] Jaja Curtis PA     1 or more acute illnesses that pose a threat to life or bodily function.   Over the counter drug management performed.  Prescription drug management performed.  Patient was discharged risks and benefits of hospitalization were considered.  Chronic medical problems impacting care include chronic kidney disease, dementia.  Shared medical decision making was utilized in creating the patients health plan today.  I independently ordered and reviewed each unique test.    I reviewed external records: provider visit note from PCP.  I reviewed external records: provider visit note from outside specialist.  I reviewed external records: previous EKG including cardiologist interpretation.    I reviewed external records: previous lab results from outside ED.  I reviewed external records: previous imaging study including radiologist interpretation.   Reviewed notes from cardiology 1/23/2025; vascular surgery 2/10/2025, primary care 4/15/2025; reviewed prior lab work, reviewed prior imaging, reviewed prior EKG    ED cardiac monitoring rhythm strip was ordered and interpreted:  sinus rhythm, no evidence of an arrhythmia  ST Segments:Normal ST segments - NO STEMI   Rate: 76  I interpreted the X-rays chest x-ray negative for pneumonia, pneumothorax, acute cardiopulmonary abnormality.  I am agreement with radiologist interpretation.  I interpreted the CT Scan CT chest pulmonary embolism without evidence of pulmonary embolism.  There is minimal consolidative airspace opacities at the left lung base as well as the lingula.  No suspicious pulmonary nodules.  There is some severe calcified coronary  atrial complexes are now Present  Confirmed by JOHNNY WHITMAN (), MILADIS COSBY ([id number]) on 4/25/2025 12:55:20 PM           CT CHEST PULMONARY EMBOLISM W CONTRAST   Final Result   1.  No pulmonary embolus.   2.  Minimal consolidative airspace opacities at the left base as well as in the   lingula. Correlate for pneumonia.              Electronically signed by Javi Hanson      XR CHEST (2 VW)   Final Result   No acute findings in the chest         Electronically signed by Samuel Glass                   Recent Labs     04/25/25  1522   COVID19 Not detected        Voice dictation software was used during the making of this note.  This software is not perfect and grammatical and other typographical errors may be present.  This note has not been completely proofread for errors.       Jaja Curtis PA  04/25/25 2006

## 2025-04-25 NOTE — ED NOTES
Patient mobility status  with no difficulty.     I have reviewed discharge instructions with the patient and spouse.  The patient and spouse verbalized understanding.    Patient left ED via Discharge Method: ambulatory to Home with Spouse.    Opportunity for questions and clarification provided.     Patient given 2 scripts.     '

## 2025-04-28 DIAGNOSIS — G47.34 NOCTURNAL HYPOXEMIA: Primary | ICD-10-CM

## 2025-04-28 DIAGNOSIS — G47.33 OSA (OBSTRUCTIVE SLEEP APNEA): ICD-10-CM

## 2025-04-29 ENCOUNTER — HOSPITAL ENCOUNTER (OUTPATIENT)
Dept: CT IMAGING | Age: 80
Discharge: HOME | End: 2025-05-02
Attending: FAMILY MEDICINE
Payer: MEDICARE

## 2025-04-29 ENCOUNTER — HOSPITAL ENCOUNTER (OUTPATIENT)
Dept: NUCLEAR MEDICINE | Age: 80
Discharge: HOME | End: 2025-05-02
Attending: FAMILY MEDICINE
Payer: MEDICARE

## 2025-04-29 DIAGNOSIS — R10.84 GENERALIZED ABDOMINAL PAIN: ICD-10-CM

## 2025-04-29 DIAGNOSIS — K92.89 GAS BLOAT SYNDROME: ICD-10-CM

## 2025-04-29 PROCEDURE — 74176 CT ABD & PELVIS W/O CONTRAST: CPT

## 2025-05-08 ENCOUNTER — HOSPITAL ENCOUNTER (OUTPATIENT)
Dept: GENERAL RADIOLOGY | Age: 80
Discharge: HOME | End: 2025-05-11
Payer: MEDICARE

## 2025-05-08 ENCOUNTER — OFFICE VISIT (OUTPATIENT)
Dept: FAMILY MEDICINE CLINIC | Facility: CLINIC | Age: 80
End: 2025-05-08

## 2025-05-08 VITALS
HEART RATE: 97 BPM | TEMPERATURE: 97.3 F | OXYGEN SATURATION: 97 % | SYSTOLIC BLOOD PRESSURE: 112 MMHG | BODY MASS INDEX: 32.93 KG/M2 | DIASTOLIC BLOOD PRESSURE: 68 MMHG | WEIGHT: 223 LBS

## 2025-05-08 DIAGNOSIS — J40 BRONCHITIS: Primary | ICD-10-CM

## 2025-05-08 DIAGNOSIS — J40 BRONCHITIS: ICD-10-CM

## 2025-05-08 DIAGNOSIS — R05.3 PERSISTENT COUGH: ICD-10-CM

## 2025-05-08 PROCEDURE — 71046 X-RAY EXAM CHEST 2 VIEWS: CPT

## 2025-05-08 RX ORDER — FLUTICASONE PROPIONATE AND SALMETEROL 250; 50 UG/1; UG/1
1 POWDER RESPIRATORY (INHALATION) EVERY 12 HOURS
Qty: 60 EACH | Refills: 3 | Status: SHIPPED | OUTPATIENT
Start: 2025-05-08

## 2025-05-08 RX ORDER — PREDNISONE 10 MG/1
1 TABLET ORAL SEE ADMIN INSTRUCTIONS
Qty: 1 EACH | Refills: 0 | Status: SHIPPED | OUTPATIENT
Start: 2025-05-08

## 2025-05-08 RX ORDER — MONTELUKAST SODIUM 10 MG/1
10 TABLET ORAL NIGHTLY
Qty: 30 TABLET | Refills: 5 | Status: SHIPPED | OUTPATIENT
Start: 2025-05-08

## 2025-05-08 RX ORDER — HYDROCODONE BITARTRATE AND HOMATROPINE METHYLBROMIDE ORAL SOLUTION 5; 1.5 MG/5ML; MG/5ML
2.5 LIQUID ORAL 4 TIMES DAILY PRN
Qty: 180 ML | Refills: 0 | Status: SHIPPED | OUTPATIENT
Start: 2025-05-08 | End: 2025-05-22

## 2025-05-08 RX ORDER — LEVOFLOXACIN 750 MG/1
750 TABLET, FILM COATED ORAL DAILY
Qty: 10 TABLET | Refills: 0 | Status: SHIPPED | OUTPATIENT
Start: 2025-05-08 | End: 2025-05-18

## 2025-05-08 RX ORDER — ALBUTEROL SULFATE 90 UG/1
2 INHALANT RESPIRATORY (INHALATION) 4 TIMES DAILY PRN
Qty: 54 G | Refills: 1 | Status: SHIPPED | OUTPATIENT
Start: 2025-05-08

## 2025-05-08 ASSESSMENT — PATIENT HEALTH QUESTIONNAIRE - PHQ9
10. IF YOU CHECKED OFF ANY PROBLEMS, HOW DIFFICULT HAVE THESE PROBLEMS MADE IT FOR YOU TO DO YOUR WORK, TAKE CARE OF THINGS AT HOME, OR GET ALONG WITH OTHER PEOPLE: NOT DIFFICULT AT ALL
7. TROUBLE CONCENTRATING ON THINGS, SUCH AS READING THE NEWSPAPER OR WATCHING TELEVISION: NOT AT ALL
SUM OF ALL RESPONSES TO PHQ QUESTIONS 1-9: 1
SUM OF ALL RESPONSES TO PHQ QUESTIONS 1-9: 1
1. LITTLE INTEREST OR PLEASURE IN DOING THINGS: NOT AT ALL
SUM OF ALL RESPONSES TO PHQ QUESTIONS 1-9: 1
8. MOVING OR SPEAKING SO SLOWLY THAT OTHER PEOPLE COULD HAVE NOTICED. OR THE OPPOSITE, BEING SO FIGETY OR RESTLESS THAT YOU HAVE BEEN MOVING AROUND A LOT MORE THAN USUAL: NOT AT ALL
2. FEELING DOWN, DEPRESSED OR HOPELESS: NOT AT ALL
9. THOUGHTS THAT YOU WOULD BE BETTER OFF DEAD, OR OF HURTING YOURSELF: NOT AT ALL
5. POOR APPETITE OR OVEREATING: NOT AT ALL
3. TROUBLE FALLING OR STAYING ASLEEP: NOT AT ALL
4. FEELING TIRED OR HAVING LITTLE ENERGY: SEVERAL DAYS
6. FEELING BAD ABOUT YOURSELF - OR THAT YOU ARE A FAILURE OR HAVE LET YOURSELF OR YOUR FAMILY DOWN: NOT AT ALL
SUM OF ALL RESPONSES TO PHQ QUESTIONS 1-9: 1

## 2025-05-08 ASSESSMENT — ENCOUNTER SYMPTOMS
APNEA: 0
GASTROINTESTINAL NEGATIVE: 1
SORE THROAT: 0
SHORTNESS OF BREATH: 1
SINUS PRESSURE: 0
RHINORRHEA: 0
CHEST TIGHTNESS: 0
WHEEZING: 1
COUGH: 1
TROUBLE SWALLOWING: 0
EYES NEGATIVE: 1
CHOKING: 0

## 2025-05-08 NOTE — PROGRESS NOTES
HISTORY OF PRESENT ILLNESS    Emmett Zheng Jr. is a 79 y.o. male.  HPI  Chief Complaint   Patient presents with    Cough     X3 weeks      See above. Onset of severe cough for 3 weeks. No fever.denies sinus congestion or sore throat. Cannot sleep due to cough. Went to ER last week. Work up included negative RSV test. CXR showed some consolidation but no specific infiltrate. Cough is productive of dark mucus. Chest hurts to cough. Generalized fatigue. No difficulty swallowing.      Current Outpatient Medications on File Prior to Visit   Medication Sig Dispense Refill    ketoconazole (NIZORAL) 2 % cream Apply topically daily to groin and anus area 30 g 1    QUEtiapine (SEROQUEL) 100 MG tablet 1 po qhs 60 tablet 3    celecoxib (CELEBREX) 200 MG capsule Take 1 capsule by mouth daily 90 capsule 3    Evolocumab 140 MG/ML SOAJ Inject 140 mg into the skin every 14 days 6 Adjustable Dose Pre-filled Pen Syringe 3    Probiotic Product (PROBIOTIC BLEND PO) Take by mouth      donepezil (ARICEPT) 10 MG tablet Take 1 tablet by mouth nightly 90 tablet 3    LORazepam (ATIVAN) 1 MG tablet Take 1 tablet by mouth 2 times daily as needed for Anxiety for up to 180 days. Max Daily Amount: 2 mg 60 tablet 5    PARoxetine (PAXIL) 30 MG tablet Take 1 tablet by mouth daily 90 tablet 3    nitroGLYCERIN (NITROSTAT) 0.4 MG SL tablet Place 1 tablet under the tongue every 5 minutes as needed for Chest pain 25 tablet 3    simethicone (MYLICON) 125 MG chewable tablet Take 1 tablet by mouth 4 times daily as needed for Flatulence 120 tablet 0    aspirin 81 MG EC tablet Take 1 tablet by mouth daily      ascorbic acid (VITAMIN C) 500 MG tablet Take 2 tablets by mouth      furosemide (LASIX) 40 MG tablet Take 1 tablet by mouth daily as needed (le edema) (Patient not taking: Reported on 5/8/2025) 90 tablet 3    potassium chloride (KLOR-CON M) 20 MEQ extended release tablet Take 1 tablet by mouth daily as needed (lasix use) (Patient not taking: Reported

## 2025-05-09 ENCOUNTER — RESULTS FOLLOW-UP (OUTPATIENT)
Dept: FAMILY MEDICINE CLINIC | Facility: CLINIC | Age: 80
End: 2025-05-09

## 2025-05-20 ENCOUNTER — HOSPITAL ENCOUNTER (OUTPATIENT)
Dept: NUCLEAR MEDICINE | Age: 80
Discharge: HOME | End: 2025-05-23
Attending: FAMILY MEDICINE
Payer: MEDICARE

## 2025-05-20 PROCEDURE — A9537 TC99M MEBROFENIN: HCPCS | Performed by: FAMILY MEDICINE

## 2025-05-20 PROCEDURE — 78227 HEPATOBIL SYST IMAGE W/DRUG: CPT

## 2025-05-20 PROCEDURE — 3430000000 HC RX DIAGNOSTIC RADIOPHARMACEUTICAL: Performed by: FAMILY MEDICINE

## 2025-05-20 PROCEDURE — 6360000004 HC RX CONTRAST MEDICATION: Performed by: FAMILY MEDICINE

## 2025-05-20 RX ORDER — SINCALIDE 5 UG/5ML
0.02 INJECTION, POWDER, LYOPHILIZED, FOR SOLUTION INTRAVENOUS ONCE
Status: COMPLETED | OUTPATIENT
Start: 2025-05-20 | End: 2025-05-20

## 2025-05-20 RX ORDER — KIT FOR THE PREPARATION OF TECHNETIUM TC 99M MEBROFENIN 45 MG/10ML
6 INJECTION, POWDER, LYOPHILIZED, FOR SOLUTION INTRAVENOUS
Status: COMPLETED | OUTPATIENT
Start: 2025-05-20 | End: 2025-05-20

## 2025-05-20 RX ADMIN — SINCALIDE 2.02 MCG: 5 INJECTION, POWDER, LYOPHILIZED, FOR SOLUTION INTRAVENOUS at 11:45

## 2025-05-20 RX ADMIN — MEBROFENIN 6 MILLICURIE: 45 INJECTION, POWDER, LYOPHILIZED, FOR SOLUTION INTRAVENOUS at 10:30

## 2025-05-20 NOTE — TELEPHONE ENCOUNTER
----- Message from Dr. Emmett Dorado MD sent at 5/20/2025  2:14 PM EDT -----  Now indication of gallstones but gallbladder is not emptying well which may be causing symptoms. Recommend consultation with surgeon to consider removal.  ----- Message -----  From: Francois, [facility] Incoming Orders Results To Radiant  Sent: 5/20/2025   2:14 PM EDT  To: Emmett Dorado Jr., MD

## 2025-05-22 ENCOUNTER — TELEPHONE (OUTPATIENT)
Dept: NEUROLOGY | Age: 80
End: 2025-05-22
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
## 2025-05-23 ENCOUNTER — TELEPHONE (OUTPATIENT)
Dept: FAMILY MEDICINE CLINIC | Facility: CLINIC | Age: 80
End: 2025-05-23

## 2025-05-23 DIAGNOSIS — R45.6 VIOLENT BEHAVIOR: ICD-10-CM

## 2025-05-23 DIAGNOSIS — F41.9 ANXIETY AND DEPRESSION: Primary | ICD-10-CM

## 2025-05-23 DIAGNOSIS — F32.A ANXIETY AND DEPRESSION: Primary | ICD-10-CM
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
## 2025-05-23 NOTE — TELEPHONE ENCOUNTER
Spoke to daughter who is ok with Psy referral. Pt is already on Ativan which he does not take often but will start giving it more often

## 2025-05-25 NOTE — PROGRESS NOTES
aDate: 2025      Name: Emmett Zheng Jr.      MRN: [id number]       : 1945       Age: 79 y.o.    Sex: male        Emmett Dorado Jr., MD       CC:  No chief complaint on file.      HPI:     Emmett Zheng Jr. is a 79 y.o. male who presents for evaluation of gallbladder problems as a referral from Dr. Dorado. The patient had a CCK HIDA scan done on 25 which showed:    INDICATION: Right upper quadrant pain.     Imaging of the abdomen was performed after intravenous injection of 6 mCi  technetium Choletec     FINDINGS: There is normal homogeneous uptake in the liver. Activity is seen in  the biliary system within 15 minutes. Activity is also promptly noted in the  gallbladder and small bowel.     The patient was then given 2 mcg of CCK at 45 minutes post injection.  The  gallbladder ejection fraction is 9%. There was a negative pain response.     IMPRESSION:  No evidence of gallbladder or bile duct obstruction. Ejection  fraction is low 9%. This indicates biliary dyskinesia.     No evidence of bile leak.    A CT scan was done on 25 which showed:    INDICATION:  Generalized abdominal pain; Other specified diseases of the  digestive system     TECHNIQUE: Multiple 2D axial images were obtained through the abdomen and pelvis  without IV contrast.  Oral contrast was used for bowel opacification.  Radiation  dose reduction techniques were used for this study:  All CT scans performed at  this facility use one or all of the following: Automated exposure control,  adjustment of the mA and/or kVp according to patient's size, iterative  reconstruction.     COMPARISON: 10/30/2023     FINDINGS:  - LUNG BASES: No infiltrates or masses.     - LIVER: Normal in size and appearance.  3.9 cm cyst in the left lobe of the  liver.  - GALLBLADDER/BILE DUCTS: No gallstones or bile duct dilatation.  - PANCREAS: Normal.  - SPLEEN: Normal.     - ADRENALS: Normal.  - KIDNEYS/URETERS: No hydronephrosis or significant

## 2025-05-27 ENCOUNTER — TELEPHONE (OUTPATIENT)
Age: 80
End: 2025-05-27

## 2025-05-27 ENCOUNTER — PREP FOR PROCEDURE (OUTPATIENT)
Dept: SURGERY | Age: 80
End: 2025-05-27

## 2025-05-27 ENCOUNTER — OFFICE VISIT (OUTPATIENT)
Dept: SURGERY | Age: 80
End: 2025-05-27
Payer: MEDICARE

## 2025-05-27 VITALS
BODY MASS INDEX: 32.76 KG/M2 | HEIGHT: 69 IN | WEIGHT: 221.2 LBS | SYSTOLIC BLOOD PRESSURE: 130 MMHG | HEART RATE: 108 BPM | DIASTOLIC BLOOD PRESSURE: 87 MMHG

## 2025-05-27 DIAGNOSIS — K82.8 BILIARY DYSKINESIA: Primary | ICD-10-CM

## 2025-05-27 DIAGNOSIS — I25.810 CORONARY ARTERY DISEASE INVOLVING CORONARY BYPASS GRAFT OF NATIVE HEART WITHOUT ANGINA PECTORIS: ICD-10-CM

## 2025-05-27 PROBLEM — K80.20 BILIARY CALCULUS: Status: ACTIVE | Noted: 2025-05-27

## 2025-05-27 PROCEDURE — G8417 CALC BMI ABV UP PARAM F/U: HCPCS | Performed by: SURGERY

## 2025-05-27 PROCEDURE — 3079F DIAST BP 80-89 MM HG: CPT | Performed by: SURGERY

## 2025-05-27 PROCEDURE — 99213 OFFICE O/P EST LOW 20 MIN: CPT | Performed by: SURGERY

## 2025-05-27 PROCEDURE — 1036F TOBACCO NON-USER: CPT | Performed by: SURGERY

## 2025-05-27 PROCEDURE — 1160F RVW MEDS BY RX/DR IN RCRD: CPT | Performed by: SURGERY

## 2025-05-27 PROCEDURE — 3075F SYST BP GE 130 - 139MM HG: CPT | Performed by: SURGERY

## 2025-05-27 PROCEDURE — 1159F MED LIST DOCD IN RCRD: CPT | Performed by: SURGERY

## 2025-05-27 PROCEDURE — 1123F ACP DISCUSS/DSCN MKR DOCD: CPT | Performed by: SURGERY

## 2025-05-27 PROCEDURE — G8427 DOCREV CUR MEDS BY ELIG CLIN: HCPCS | Performed by: SURGERY

## 2025-05-27 NOTE — TELEPHONE ENCOUNTER
Requested information faxed to the provided number .    Low risk for perioperative CV complication. Should continue asa 81 mg throughout the procedure.    Francisco Clay MD

## 2025-05-27 NOTE — TELEPHONE ENCOUNTER
Cardiac Clearance        Physician or Practice Requesting:[facility]   : Dr. Bola Jessica   Contact Phone Number: [phone]  Fax Number: [phone]  Date of Surgery/Procedure: 06/19/25  Type of Surgery or Procedure: Laparoscopic cholecystectomy   Type of Anesthesia: General   Type of Clearance Requested: risk assessment and any medication hold   Medication to Hold:?  Days to Hold: ?

## 2025-05-27 NOTE — TELEPHONE ENCOUNTER
Patient is in need of a risk adjustment assessment for Laparoscopic cholecystectomy on 6/19/2025.  Last office visit 1/23/2025, last EKG 4/25/2025.  Any medication holds?  Please advise.

## 2025-05-30 RX ORDER — ZINC GLUCONATE 50 MG
50 TABLET ORAL DAILY
COMMUNITY

## 2025-05-30 RX ORDER — PSYLLIUM HUSK 0.4 G
1000 CAPSULE ORAL DAILY
COMMUNITY

## 2025-05-30 NOTE — PERIOP NOTE
Phone assessment completed with patient's wife (Ava) who is found listed as able to discuss PHI on patient communication sheet found in chart under media.    Instructions also sent through [employer] message.      Patient's name and  verified.    Order for consent NOT found in EHR at time of PAT visit. Unable to verify case posting against order; surgery verified by patient's wife.       Type 1B surgery, PAT phone assessment complete.  Orders not received.    Labs per surgeon: unknown; no orders received      Labs per anesthesia protocol: Patient to come to [address] [place] [address], [address] between 8:00 am - 3:30 pm Mon-Fri to have hgb, K+ drawn. Order placed in Epic and chart flagged for charge nurse        Medical/surgical history questions at their best of ability. All prior to admission medications documented in EPIC.    Chart sent through Procedure Pass for anesthesia to review along with the following information:    Patient dx with pneumonia in ER 25 and sent home with antibiotic. Went to PCP 25 w/ continuing severe cough, SOB, wheezing. Was prescribed Levaquin and quickly recovered. Wife states all symptoms resolved around 5 days later and have not recurred since. Chest X-ray 25 negative.    Chart flagged for charge nurse to follow up.      PLEASE CONTINUE TAKING ALL PRESCRIPTION MEDICATIONS UP TO THE DAY OF PROCEDURE UNLESS OTHERWISE DIRECTED BELOW. You may take Tylenol, allergy,  and/or indigestion medications.     TAKE ONLY THESE MEDICATIONS ON THE DAY OF PROCEDURE   Lorazepam if needed      Continue nightly 81 mg aspirin     DISCONTINUE vitamins and supplements 7 days prior to surgery (may continue potassium if taking furosemide). DISCONTINUE Non-Steroidal Anti-Inflammatory (NSAIDS) such as Advil (Ibuprofen) and Aleve (Naproxen) 5 days prior to procedure.     Prescription Medications to Hold- please continue all other medications except these.    Hold donepezil (Aricept) beginning 2 weeks  prior to your procedure.  Hold biologic medications (Repatha) beginning now.   Do not take Celebrex or furosemide on the morning of procedure.     Comments                Instructed on the following:  > Arrive at Main Entrance, time of arrival to be called the day before by 1700  > No food after midnight, patient may drink clear liquids up until 2 hours prior to arrival. No gum, candy, mints. You may take medications as instructed above with a sip of water.  > Responsible adult must drive patient to the hospital, stay during surgery, and patient will need supervision 24 hours after anesthesia  > Use non moisturizing, antibacterial soap in shower the night before surgery and on the morning of surgery  > All piercings must be removed prior to arrival.    > Leave all valuables (money and jewelry) at home but bring insurance card and ID on DOS.   > You may be required to pay a deductible or co-pay on the day of your procedure. You can pre-pay by calling [phone] if your surgery is at the [facility] or [phone] if your surgery is at the [facility].  > Do not wear make-up, nail polish, lotions, cologne, perfumes, powders, or oil on skin. Artificial nails are not permitted.

## 2025-06-02 ENCOUNTER — ANESTHESIA EVENT (OUTPATIENT)
Dept: SURGERY | Age: 80
End: 2025-06-02
Payer: MEDICARE

## 2025-06-10 ENCOUNTER — HOSPITAL ENCOUNTER (OUTPATIENT)
Dept: LAB | Age: 80
Discharge: HOME | End: 2025-06-13
Payer: MEDICARE

## 2025-06-10 DIAGNOSIS — Z01.818 PRE-OP TESTING: ICD-10-CM

## 2025-06-10 LAB
HGB BLD-MCNC: 14.6 G/DL (ref 13.6–17.2)
POTASSIUM SERPL-SCNC: 4.1 MMOL/L (ref 3.5–5.1)

## 2025-06-10 PROCEDURE — 85018 HEMOGLOBIN: CPT

## 2025-06-10 PROCEDURE — 84132 ASSAY OF SERUM POTASSIUM: CPT

## 2025-06-10 PROCEDURE — 36415 COLL VENOUS BLD VENIPUNCTURE: CPT

## 2025-06-13 NOTE — H&P
aDate: 2025      Name: Emmett Zheng Jr.      MRN: [id number]       : 1945       Age: 79 y.o.    Sex: male        Emmett Dorado Jr., MD       CC:  No chief complaint on file.      HPI:     Emmett Zheng Jr. is a 79 y.o. male who presents for evaluation of gallbladder problems as a referral from Dr. Dorado. The patient had a CCK HIDA scan done on 25 which showed:    INDICATION: Right upper quadrant pain.     Imaging of the abdomen was performed after intravenous injection of 6 mCi  technetium Choletec     FINDINGS: There is normal homogeneous uptake in the liver. Activity is seen in  the biliary system within 15 minutes. Activity is also promptly noted in the  gallbladder and small bowel.     The patient was then given 2 mcg of CCK at 45 minutes post injection.  The  gallbladder ejection fraction is 9%. There was a negative pain response.     IMPRESSION:  No evidence of gallbladder or bile duct obstruction. Ejection  fraction is low 9%. This indicates biliary dyskinesia.     No evidence of bile leak.    A CT scan was done on 25 which showed:    INDICATION:  Generalized abdominal pain; Other specified diseases of the  digestive system     TECHNIQUE: Multiple 2D axial images were obtained through the abdomen and pelvis  without IV contrast.  Oral contrast was used for bowel opacification.  Radiation  dose reduction techniques were used for this study:  All CT scans performed at  this facility use one or all of the following: Automated exposure control,  adjustment of the mA and/or kVp according to patient's size, iterative  reconstruction.     COMPARISON: 10/30/2023     FINDINGS:  - LUNG BASES: No infiltrates or masses.     - LIVER: Normal in size and appearance.  3.9 cm cyst in the left lobe of the  liver.  - GALLBLADDER/BILE DUCTS: No gallstones or bile duct dilatation.  - PANCREAS: Normal.  - SPLEEN: Normal.     - ADRENALS: Normal.  - KIDNEYS/URETERS: No hydronephrosis or significant  Comment: 1 glass wine with supper    Drug use: No       FH:    Family History   Problem Relation Age of Onset    Alzheimer's Disease Mother     Stroke Father     Heart Disease Father        ROS: The patient has no difficulty with chest pain or shortness of breath.  No fever or chills.  Comprehensive 13 point review of systems was otherwise unremarkable except as noted above.    Physical Exam:     Ht 1.778 m (5' 10\")   Wt 100.2 kg (221 lb)   BMI 31.71 kg/m²     General: Alert, oriented, cooperative white male in no acute distress.   Eyes: Sclera are clear. Conjunctiva and lids within normal limits. No icterus.  Ears and Nose: no gross deformities to visual inspection, gross hearing intact  Neck: Supple, trachea midline, no appreciable thyromegaly  Resp: Breathing is  non-labored. Lungs clear to auscultation without wheezing or rhonchi   CV: RRR. No murmurs, rubs or gallops appreciated.  Abd: soft, mild epigastric tenderness, active BS'S. Diastasis rect.   Psych:  Mood and affect appropriate.  Short-term memory and understanding intact      Assessment/Plan:  Emmett Zheng Jr. is a 79 y.o. male who has signs and symptoms consistent with biliary dyskinesia having an EF of 9%.    1. Cardiology clearance.    2. Laparoscopic, possible open, cholecystectomy.    I went through the risks of bleeding, infection and anesthesia. I went through other risks of injury to the liver, biliary tree structures, stomach, small bowel, large bowel , pancreas and the potential need to convert to an open procedure.    JAMES ORDONEZ MD     [facility]   6/13/2025  11:00 AM

## 2025-06-19 ENCOUNTER — HOSPITAL ENCOUNTER (OUTPATIENT)
Age: 80
Setting detail: OUTPATIENT SURGERY
Discharge: HOME | End: 2025-06-19
Attending: SURGERY | Admitting: SURGERY
Payer: MEDICARE

## 2025-06-19 ENCOUNTER — ANESTHESIA (OUTPATIENT)
Dept: SURGERY | Age: 80
End: 2025-06-19
Payer: MEDICARE

## 2025-06-19 VITALS
OXYGEN SATURATION: 93 % | SYSTOLIC BLOOD PRESSURE: 150 MMHG | HEART RATE: 71 BPM | RESPIRATION RATE: 18 BRPM | DIASTOLIC BLOOD PRESSURE: 74 MMHG | TEMPERATURE: 97 F | WEIGHT: 222 LBS | HEIGHT: 70 IN | BODY MASS INDEX: 31.78 KG/M2

## 2025-06-19 DIAGNOSIS — K82.8 BILIARY DYSKINESIA: Primary | ICD-10-CM

## 2025-06-19 DIAGNOSIS — Z01.818 PRE-OP TESTING: ICD-10-CM

## 2025-06-19 LAB — POTASSIUM BLD-SCNC: 3.6 MMOL/L (ref 3.5–5.1)

## 2025-06-19 PROCEDURE — 6360000002 HC RX W HCPCS: Performed by: REGISTERED NURSE

## 2025-06-19 PROCEDURE — 84132 ASSAY OF SERUM POTASSIUM: CPT

## 2025-06-19 PROCEDURE — 88304 TISSUE EXAM BY PATHOLOGIST: CPT

## 2025-06-19 PROCEDURE — 6360000002 HC RX W HCPCS: Performed by: SURGERY

## 2025-06-19 PROCEDURE — 3700000001 HC ADD 15 MINUTES (ANESTHESIA): Performed by: SURGERY

## 2025-06-19 PROCEDURE — 2580000003 HC RX 258: Performed by: ANESTHESIOLOGY

## 2025-06-19 PROCEDURE — 3600000004 HC SURGERY LEVEL 4 BASE: Performed by: SURGERY

## 2025-06-19 PROCEDURE — 3700000000 HC ANESTHESIA ATTENDED CARE: Performed by: SURGERY

## 2025-06-19 PROCEDURE — 2500000003 HC RX 250 WO HCPCS: Performed by: REGISTERED NURSE

## 2025-06-19 PROCEDURE — 6370000000 HC RX 637 (ALT 250 FOR IP): Performed by: ANESTHESIOLOGY

## 2025-06-19 PROCEDURE — 47562 LAPAROSCOPIC CHOLECYSTECTOMY: CPT | Performed by: SURGERY

## 2025-06-19 PROCEDURE — 7100000000 HC PACU RECOVERY - FIRST 15 MIN: Performed by: SURGERY

## 2025-06-19 PROCEDURE — 7100000010 HC PHASE II RECOVERY - FIRST 15 MIN: Performed by: SURGERY

## 2025-06-19 PROCEDURE — 3600000014 HC SURGERY LEVEL 4 ADDTL 15MIN: Performed by: SURGERY

## 2025-06-19 PROCEDURE — 2720000010 HC SURG SUPPLY STERILE: Performed by: SURGERY

## 2025-06-19 PROCEDURE — 7100000011 HC PHASE II RECOVERY - ADDTL 15 MIN: Performed by: SURGERY

## 2025-06-19 PROCEDURE — 7100000001 HC PACU RECOVERY - ADDTL 15 MIN: Performed by: SURGERY

## 2025-06-19 PROCEDURE — 2709999900 HC NON-CHARGEABLE SUPPLY: Performed by: SURGERY

## 2025-06-19 RX ORDER — BUPIVACAINE HYDROCHLORIDE 5 MG/ML
INJECTION, SOLUTION EPIDURAL; INTRACAUDAL; PERINEURAL PRN
Status: DISCONTINUED | OUTPATIENT
Start: 2025-06-19 | End: 2025-06-19 | Stop reason: ALTCHOICE

## 2025-06-19 RX ORDER — OXYCODONE AND ACETAMINOPHEN 5; 325 MG/1; MG/1
1 TABLET ORAL EVERY 6 HOURS PRN
Qty: 20 TABLET | Refills: 0 | Status: SHIPPED | OUTPATIENT
Start: 2025-06-19 | End: 2025-06-24

## 2025-06-19 RX ORDER — NALOXONE HYDROCHLORIDE 0.4 MG/ML
INJECTION, SOLUTION INTRAMUSCULAR; INTRAVENOUS; SUBCUTANEOUS PRN
Status: DISCONTINUED | OUTPATIENT
Start: 2025-06-19 | End: 2025-06-19 | Stop reason: HOSPADM

## 2025-06-19 RX ORDER — GLYCOPYRROLATE 0.2 MG/ML
INJECTION INTRAMUSCULAR; INTRAVENOUS
Status: DISCONTINUED | OUTPATIENT
Start: 2025-06-19 | End: 2025-06-19 | Stop reason: SDUPTHER

## 2025-06-19 RX ORDER — SODIUM CHLORIDE 9 MG/ML
INJECTION, SOLUTION INTRAVENOUS PRN
Status: DISCONTINUED | OUTPATIENT
Start: 2025-06-19 | End: 2025-06-19 | Stop reason: HOSPADM

## 2025-06-19 RX ORDER — SODIUM CHLORIDE 0.9 % (FLUSH) 0.9 %
5-40 SYRINGE (ML) INJECTION PRN
Status: DISCONTINUED | OUTPATIENT
Start: 2025-06-19 | End: 2025-06-19 | Stop reason: HOSPADM

## 2025-06-19 RX ORDER — FENTANYL CITRATE 50 UG/ML
INJECTION, SOLUTION INTRAMUSCULAR; INTRAVENOUS
Status: DISCONTINUED | OUTPATIENT
Start: 2025-06-19 | End: 2025-06-19 | Stop reason: SDUPTHER

## 2025-06-19 RX ORDER — ACETAMINOPHEN 500 MG
1000 TABLET ORAL ONCE
Status: COMPLETED | OUTPATIENT
Start: 2025-06-19 | End: 2025-06-19

## 2025-06-19 RX ORDER — NEOSTIGMINE METHYLSULFATE 1 MG/ML
INJECTION, SOLUTION INTRAVENOUS
Status: DISCONTINUED | OUTPATIENT
Start: 2025-06-19 | End: 2025-06-19 | Stop reason: SDUPTHER

## 2025-06-19 RX ORDER — MIDAZOLAM HYDROCHLORIDE 2 MG/2ML
2 INJECTION, SOLUTION INTRAMUSCULAR; INTRAVENOUS
Status: DISCONTINUED | OUTPATIENT
Start: 2025-06-19 | End: 2025-06-19 | Stop reason: HOSPADM

## 2025-06-19 RX ORDER — PROPOFOL 10 MG/ML
INJECTION, EMULSION INTRAVENOUS
Status: DISCONTINUED | OUTPATIENT
Start: 2025-06-19 | End: 2025-06-19 | Stop reason: SDUPTHER

## 2025-06-19 RX ORDER — DEXAMETHASONE SODIUM PHOSPHATE 4 MG/ML
INJECTION, SOLUTION INTRA-ARTICULAR; INTRALESIONAL; INTRAMUSCULAR; INTRAVENOUS; SOFT TISSUE
Status: DISCONTINUED | OUTPATIENT
Start: 2025-06-19 | End: 2025-06-19 | Stop reason: SDUPTHER

## 2025-06-19 RX ORDER — ONDANSETRON 2 MG/ML
INJECTION INTRAMUSCULAR; INTRAVENOUS
Status: DISCONTINUED | OUTPATIENT
Start: 2025-06-19 | End: 2025-06-19 | Stop reason: SDUPTHER

## 2025-06-19 RX ORDER — LIDOCAINE HYDROCHLORIDE 20 MG/ML
INJECTION, SOLUTION EPIDURAL; INFILTRATION; INTRACAUDAL; PERINEURAL
Status: DISCONTINUED | OUTPATIENT
Start: 2025-06-19 | End: 2025-06-19 | Stop reason: SDUPTHER

## 2025-06-19 RX ORDER — PROCHLORPERAZINE EDISYLATE 5 MG/ML
5 INJECTION INTRAMUSCULAR; INTRAVENOUS
Status: DISCONTINUED | OUTPATIENT
Start: 2025-06-19 | End: 2025-06-19 | Stop reason: HOSPADM

## 2025-06-19 RX ORDER — OXYCODONE HYDROCHLORIDE 5 MG/1
5 TABLET ORAL
Status: COMPLETED | OUTPATIENT
Start: 2025-06-19 | End: 2025-06-19

## 2025-06-19 RX ORDER — SODIUM CHLORIDE 0.9 % (FLUSH) 0.9 %
5-40 SYRINGE (ML) INJECTION EVERY 12 HOURS SCHEDULED
Status: DISCONTINUED | OUTPATIENT
Start: 2025-06-19 | End: 2025-06-19 | Stop reason: HOSPADM

## 2025-06-19 RX ORDER — ROCURONIUM BROMIDE 10 MG/ML
INJECTION, SOLUTION INTRAVENOUS
Status: DISCONTINUED | OUTPATIENT
Start: 2025-06-19 | End: 2025-06-19 | Stop reason: SDUPTHER

## 2025-06-19 RX ORDER — LIDOCAINE HYDROCHLORIDE 10 MG/ML
1 INJECTION, SOLUTION INFILTRATION; PERINEURAL
Status: DISCONTINUED | OUTPATIENT
Start: 2025-06-19 | End: 2025-06-19 | Stop reason: HOSPADM

## 2025-06-19 RX ORDER — SODIUM CHLORIDE, SODIUM LACTATE, POTASSIUM CHLORIDE, CALCIUM CHLORIDE 600; 310; 30; 20 MG/100ML; MG/100ML; MG/100ML; MG/100ML
INJECTION, SOLUTION INTRAVENOUS CONTINUOUS
Status: DISCONTINUED | OUTPATIENT
Start: 2025-06-19 | End: 2025-06-19 | Stop reason: HOSPADM

## 2025-06-19 RX ADMIN — LIDOCAINE HYDROCHLORIDE 100 MG: 20 INJECTION, SOLUTION EPIDURAL; INFILTRATION; INTRACAUDAL; PERINEURAL at 07:19

## 2025-06-19 RX ADMIN — PROPOFOL 30 MG: 10 INJECTION, EMULSION INTRAVENOUS at 08:06

## 2025-06-19 RX ADMIN — EPHEDRINE SULFATE 10 MG: 5 INJECTION INTRAVENOUS at 07:33

## 2025-06-19 RX ADMIN — EPHEDRINE SULFATE 5 MG: 5 INJECTION INTRAVENOUS at 07:50

## 2025-06-19 RX ADMIN — SODIUM CHLORIDE, SODIUM LACTATE, POTASSIUM CHLORIDE, AND CALCIUM CHLORIDE: 600; 310; 30; 20 INJECTION, SOLUTION INTRAVENOUS at 08:05

## 2025-06-19 RX ADMIN — GLYCOPYRROLATE 0.8 MG: 0.2 INJECTION INTRAMUSCULAR; INTRAVENOUS at 08:01

## 2025-06-19 RX ADMIN — ONDANSETRON 4 MG: 2 INJECTION, SOLUTION INTRAMUSCULAR; INTRAVENOUS at 07:27

## 2025-06-19 RX ADMIN — Medication 2 G: at 07:25

## 2025-06-19 RX ADMIN — Medication 5 MG: at 08:01

## 2025-06-19 RX ADMIN — ACETAMINOPHEN 1000 MG: 500 TABLET, FILM COATED ORAL at 06:52

## 2025-06-19 RX ADMIN — SODIUM CHLORIDE, SODIUM LACTATE, POTASSIUM CHLORIDE, AND CALCIUM CHLORIDE: 600; 310; 30; 20 INJECTION, SOLUTION INTRAVENOUS at 06:53

## 2025-06-19 RX ADMIN — OXYCODONE 5 MG: 5 TABLET ORAL at 09:09

## 2025-06-19 RX ADMIN — DEXAMETHASONE SODIUM PHOSPHATE 4 MG: 4 INJECTION INTRA-ARTICULAR; INTRALESIONAL; INTRAMUSCULAR; INTRAVENOUS; SOFT TISSUE at 07:27

## 2025-06-19 RX ADMIN — PROPOFOL 160 MG: 10 INJECTION, EMULSION INTRAVENOUS at 07:19

## 2025-06-19 RX ADMIN — FENTANYL CITRATE 100 MCG: 50 INJECTION, SOLUTION INTRAMUSCULAR; INTRAVENOUS at 07:19

## 2025-06-19 RX ADMIN — ROCURONIUM BROMIDE 50 MG: 10 INJECTION, SOLUTION INTRAVENOUS at 07:19

## 2025-06-19 RX ADMIN — PROPOFOL 40 MG: 10 INJECTION, EMULSION INTRAVENOUS at 07:57

## 2025-06-19 ASSESSMENT — PAIN DESCRIPTION - LOCATION: LOCATION: ABDOMEN

## 2025-06-19 ASSESSMENT — ENCOUNTER SYMPTOMS: DYSPNEA ACTIVITY LEVEL: NO INTERVAL CHANGE

## 2025-06-19 ASSESSMENT — PAIN - FUNCTIONAL ASSESSMENT: PAIN_FUNCTIONAL_ASSESSMENT: 0-10

## 2025-06-19 ASSESSMENT — PAIN SCALES - GENERAL: PAINLEVEL_OUTOF10: 4

## 2025-06-19 ASSESSMENT — LIFESTYLE VARIABLES: SMOKING_STATUS: 1

## 2025-06-19 ASSESSMENT — PAIN DESCRIPTION - ORIENTATION: ORIENTATION: ANTERIOR;RIGHT

## 2025-06-19 NOTE — ANESTHESIA POSTPROCEDURE EVALUATION
Department of Anesthesiology  Postprocedure Note    Patient: Emmett Zheng Jr.  MRN: [id number]  YOB: 1945  Date of evaluation: 6/19/2025    Procedure Summary       Date: 06/19/25 Room / Location: [facility] MAIN OR  / [facility] MAIN OR    Anesthesia Start: 0712 Anesthesia Stop: 0826    Procedure: LAPAROSCOPIC/OPEN CHOLECYSTECTOMY (Abdomen) Diagnosis:       Biliary calculus      (Biliary calculus [K80.20])    Providers: Bola Jessica MD Responsible Provider: Anu Gasca MD    Anesthesia Type: General ASA Status: 3            Anesthesia Type: General    Damon Phase I: Damon Score: 10    Damon Phase II: Damon Score: 10    Anesthesia Post Evaluation    Patient location during evaluation: PACU  Patient participation: complete - patient participated  Level of consciousness: awake and alert  Airway patency: patent  Nausea & Vomiting: no nausea and no vomiting  Cardiovascular status: hemodynamically stable  Respiratory status: acceptable, nonlabored ventilation and spontaneous ventilation  Hydration status: euvolemic  Comments: BP (!) 162/77   Pulse 72   Temp 97 °F (36.1 °C) (Infrared)   Resp 18   Ht 1.778 m (5' 10\")   Wt 100.7 kg (222 lb)   SpO2 92%   BMI 31.85 kg/m²     Multimodal analgesia pain management approach  Pain management: adequate and satisfactory to patient    No notable events documented.

## 2025-06-19 NOTE — ANESTHESIA PRE PROCEDURE
(>4 METS), Ambulates well unassisted  (+) hypertension:, CAD:, CABG/stent (stents (last 2019), CABG 2011):, dysrhythmias: atrial fibrillation, NEWSOME: no interval change        Rhythm: regular  Rate: normal                 ROS comment: Stress test 11/21:  1. Stress EKG: Normal.  Baseline RBBB.  2. SPECT Perfusion Imaging: Normal Perfusion.   3. LV Systolic Function is normal.   4. Risk Assessment: Normal.    Unremarkable echo 2018       Neuro/Psych:   (+) depression/anxiety dementia            GI/Hepatic/Renal:   (+) GERD:, renal disease: CRI         ROS comment: SBO.   Endo/Other:    (+) : arthritis:..                 Abdominal:             Vascular: negative vascular ROS.         Other Findings:             Anesthesia Plan      general     ASA 3       Induction: intravenous.    MIPS: Postoperative opioids intended and Prophylactic antiemetics administered.  Anesthetic plan and risks discussed with patient and spouse.      Plan discussed with CRNA.                    Anu Monson MD   6/19/2025

## 2025-06-19 NOTE — DISCHARGE INSTRUCTIONS
1. Diet as tolerated except for a  low fat diet after laparoscopic cholecystectomy.    2. Showering is allowed, but no tub baths, hot tubs or swimming.    3. Drainage is common from the wounds. Change the dressings as needed. Call our office if the wounds become reddened, tender, feel warm to the touch or pus starts to drain from the wound.    4. Take prescribed pain medication as directed, usually Percocet, Norco, Ultram or Dilaudid. Take over the counter medication for minor pain.    5. Ice may be applied intermittently to the surgical site or sites.    6. Call or office, [phone], if problems arise.    7. Follow up in the office at the assigned time.    8. Resume all medications as taken per surgery, unless specifically instructed not to take certain ones.    9. No lifting more than 25 pounds until told otherwise.    10. Driving is allowed 3 days after surgery as long as you feel comfortable enough to drive and have not taken any prescription pain medication prior to driving.      After general anesthesia or intravenous sedation, for 24 hours or while taking prescription Narcotics:  Limit your activities  Some people will feel drowsy or dizzy for up to a few hours after waking up.  A responsible adult needs to be with you for the next 24 hours  Do not drive and operate hazardous machinery  Do not make important personal or business decisions  Do not drink alcoholic beverages  If you have not urinated within 8 hours after discharge, and you are experiencing discomfort from urinary retention, please go to the nearest ED.  If you have sleep apnea and have a CPAP machine, please use it for all naps and sleeping.  Please use caution when taking narcotics and any of your home medications that may cause drowsiness.  *  Please give a list of your current medications to your Primary Care Provider.  *  Please update this list whenever your medications are discontinued, doses are      changed, or new medications

## 2025-06-19 NOTE — PROGRESS NOTES
SPIRITUAL HEALTH   Note for Pre-Surgery Prayer                   Room # MOR/PL    Name: Emmett hZeng Jr.           Age: 79 y.o.    Gender: male          MRN: [id number]  Jainism: Episcopal       Preferred Language: English    Date: 06/19/25  Visit Time: Begin Time: 0649 End Time : 0712 Complexity of Encounter: Moderate      Visit Summary: Pre-surgery Prayer.  responded to referral. Patient was engaged in the visit. Spouse was at the bedside. Patient shared about surgery including concerns and hopes. Patient expressed affection for Spouse. 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  provided calming presence, active listening, and spiritual and emotional support. Patient and Spouse expressed trust in [place]bin and comfort in lizbeth and prayer. Patient is Episcopal.  offered prayer.  offered support and is available by referral for emergent needs.    Referral/Consult From: Nurse, Patient  Encounter Overview/Reason: Pre-Op  Service Provided For: Patient and family together     Patient was available.    Lizbeth, Belief, Meaning:   Patient identifies as spiritual  is connected with a lizbeth tradition or spiritual practice  has beliefs or practices that help with coping during difficult times  Family/Friends identifies as spiritual  are connected with a lizbeth tradition or spiritual practice  have beliefs or practices that help with coping during difficult times    Importance and Influence:  Patient has spiritual/personal beliefs that influence decisions regarding their health  Family/Friends has spiritual/personal beliefs that influence decisions regarding the patient's health    Community:  Patient   indicated that they feel well-supported  Support System Includes   Spouse   Family/Friends   Support System Includes   Spouse     Assessment and Plan of Care:   Emotions Expressed by Patient:   Assessment: Calm, Coping, Hopeful, Peaceful, Sad    Interventions by :   Intervention:

## 2025-06-19 NOTE — OP NOTE
Cholecystectomy Op Note Template Note     Indications: The patient was admitted to the hospital with biliary dyskinesia having an EF 9%.   The patient now presents for a laparoscopic, possible open, cholecystectomy after discussing therapeutic alternatives.     Pre-Op Diagnosis: Biliary calculus [K80.20]    Post-Op Diagnosis: Same    Procedure: LAPAROSCOPIC CHOLECYSTECTOMY      Surgeon: JAMES ORDONEZ MD    Anesthesia:  General plus local         Procedure Details     The patient was brought to the operating room and placed supine. After induction of a general anesthetic, the abdomen was prepped and draped in standard fashion.  An incision was made in the umbilicus and a Jeremi trocar was placed in the usual fashion after which  the abdomen was insufflated to 15mmHg sterile CO2.  The other trocars were then placed under direct vision.  These were positioned four fingerbreadths below the right costal margin in the anterior axillary line and mid clavicular line and just to the right of the falciform ligament in the epigatrium.  The gallbladder was grasped.  Omental adhesions were taken down. The area of Calot's triangle was dissected with the cystic duct and cystic artery being exposed. Once these two structures had been identified the gallbladder graspers were repositioned, so that one was on the liver/gallbladder junction and a second on the fundus of the gallbladder. The gallbladder was  from the liver with the use of the Harmonic scalpel. The area of Calot's triangle was dissected. The Critical View of Safety was obtained. Once the only two structures going into the gallbladder were identified, then the structures were clipped. The dissection was taken down to the cystic artery which was divided between four 5 mm clips. The cystic duct was divided between four 5 mm clips. The gallbladder was free of all attachments and was removed from the abdomen via the umbilical incision site. The gallbladder was  sent to pathology for evaluation at this point. The Jeremi was returned to the umbilical incision site, the insufflation was restarted and the camera placed through the Jeremi trocar. The gallbladder fossa was without evidence of bleeding, there was no bleeding from the cystic artery stump and there was no evidence of bile leak from the cystic duct stump. The three upper abdominal trocars were removed under the direct vision without bleeding from the trocar sites. The Jeremi trocar was removed and the fascia of the umbilicus was closed with 0'0  Vicryl. The trocar sites were closed with the subcuticular Monocryl and Steri-strips.  A sterile dressing was applied.  The patient was taken to the recovery room in good condition, having tolerated the procedure well.  Instrument, sponge, and needle counts were correct prior to abdominal closure and at the conclusion of the case.     Findings: Distended gallbladder    Estimated Blood Loss: 10 cc's         Specimens: Gallbladder       Signed: JAMES ORDONEZ MD

## 2025-06-19 NOTE — INTERVAL H&P NOTE
Update History & Physical    The patient's History and Physical of 6/13/25 was reviewed with the patient and I examined the patient. There was no change. The surgical site was confirmed by the patient and me.     Plan: The risks, benefits, expected outcome, and alternative to the recommended procedure have been discussed with the patient. Patient understands and wants to proceed with the procedure.     Electronically signed by JAMES ORDONEZ MD on 6/19/2025 at 6:50 AM

## 2025-07-01 ENCOUNTER — OFFICE VISIT (OUTPATIENT)
Dept: SURGERY | Age: 80
End: 2025-07-01

## 2025-07-01 DIAGNOSIS — K80.01 CALCULUS OF GALLBLADDER WITH ACUTE CHOLECYSTITIS AND OBSTRUCTION: Primary | ICD-10-CM

## 2025-07-01 PROCEDURE — 99024 POSTOP FOLLOW-UP VISIT: CPT

## 2025-07-01 NOTE — PROGRESS NOTES
poDate: 2025      Name: Emmett Zheng Jr.      MRN: [id number]       : 1945       Age: 79 y.o.    Sex: male        Emmett Dorado Jr., MD       CC:  No chief complaint on file.      HPI:  The patient presents for a post-op visit s/p lap unruly. The patient reports that they are doing well. Pt has some mild post op pain, however, they deny any new or worsening symptoms. Pt denies any difficulty with eating, voiding, or movement. Surgical incision is well appearing with no signs of infection.      Final Pathologic Diagnosis     A: Gallbladder, cholecystectomy:   -Chronic cholecystitis.     Physical Exam:     There were no vitals taken for this visit.    General: Alert, oriented, cooperative in no acute distress.     Neck: Supple, trachea midline, no appreciable thyromegaly  Resp: Breathing is  non-labored. Lungs clear to auscultation without wheezing or rhonchi   CV: RRR. No murmurs, rubs or gallops appreciated.  Abd: soft non-tender and non-distended without peritoneal signs. +bs    Skin/incision: No evidence of infection.      Assessment/Plan:  Emmett Zheng Jr. is a 79 y.o. male who is s/p lap unruly.    - avoid heavy lifting for 4-6 weeks.  - Avoid submerging in water for 3 weeks post op.  - Call with any questions or concerns.  - Follow up as needed.      OSORIO Nino   2025  1:00 PM

## 2025-07-03 DIAGNOSIS — F41.9 ANXIETY: ICD-10-CM

## 2025-07-03 RX ORDER — LORAZEPAM 1 MG/1
TABLET ORAL
Qty: 60 TABLET | OUTPATIENT
Start: 2025-07-03

## 2025-07-22 ENCOUNTER — OFFICE VISIT (OUTPATIENT)
Dept: NEUROLOGY | Age: 80
End: 2025-07-22
Payer: MEDICARE

## 2025-07-22 VITALS
WEIGHT: 223.4 LBS | SYSTOLIC BLOOD PRESSURE: 142 MMHG | DIASTOLIC BLOOD PRESSURE: 79 MMHG | HEIGHT: 70 IN | OXYGEN SATURATION: 94 % | HEART RATE: 79 BPM | BODY MASS INDEX: 31.98 KG/M2

## 2025-07-22 DIAGNOSIS — F02.B11 MODERATE LATE ONSET ALZHEIMER'S DEMENTIA WITH AGITATION (HCC): Primary | ICD-10-CM

## 2025-07-22 DIAGNOSIS — G30.1 MODERATE LATE ONSET ALZHEIMER'S DEMENTIA WITH AGITATION (HCC): Primary | ICD-10-CM

## 2025-07-22 DIAGNOSIS — F41.9 ANXIETY: ICD-10-CM

## 2025-07-22 DIAGNOSIS — R45.1 AGITATED: ICD-10-CM

## 2025-07-22 PROCEDURE — 1123F ACP DISCUSS/DSCN MKR DOCD: CPT | Performed by: PSYCHIATRY & NEUROLOGY

## 2025-07-22 PROCEDURE — 3078F DIAST BP <80 MM HG: CPT | Performed by: PSYCHIATRY & NEUROLOGY

## 2025-07-22 PROCEDURE — 1036F TOBACCO NON-USER: CPT | Performed by: PSYCHIATRY & NEUROLOGY

## 2025-07-22 PROCEDURE — 99215 OFFICE O/P EST HI 40 MIN: CPT | Performed by: PSYCHIATRY & NEUROLOGY

## 2025-07-22 PROCEDURE — G8427 DOCREV CUR MEDS BY ELIG CLIN: HCPCS | Performed by: PSYCHIATRY & NEUROLOGY

## 2025-07-22 PROCEDURE — G8417 CALC BMI ABV UP PARAM F/U: HCPCS | Performed by: PSYCHIATRY & NEUROLOGY

## 2025-07-22 PROCEDURE — 1159F MED LIST DOCD IN RCRD: CPT | Performed by: PSYCHIATRY & NEUROLOGY

## 2025-07-22 PROCEDURE — 3077F SYST BP >= 140 MM HG: CPT | Performed by: PSYCHIATRY & NEUROLOGY

## 2025-07-23 PROBLEM — F41.9 ANXIETY: Status: ACTIVE | Noted: 2025-07-23

## 2025-07-23 PROBLEM — R45.1 AGITATED: Status: ACTIVE | Noted: 2025-07-23

## 2025-08-28 ENCOUNTER — OFFICE VISIT (OUTPATIENT)
Age: 80
End: 2025-08-28
Payer: MEDICARE

## 2025-08-28 VITALS
HEART RATE: 84 BPM | WEIGHT: 224.9 LBS | BODY MASS INDEX: 32.2 KG/M2 | HEIGHT: 70 IN | DIASTOLIC BLOOD PRESSURE: 60 MMHG | SYSTOLIC BLOOD PRESSURE: 122 MMHG

## 2025-08-28 DIAGNOSIS — Z95.1 S/P CABG (CORONARY ARTERY BYPASS GRAFT): ICD-10-CM

## 2025-08-28 DIAGNOSIS — I10 PRIMARY HYPERTENSION: Primary | ICD-10-CM

## 2025-08-28 DIAGNOSIS — I25.810 CORONARY ARTERY DISEASE INVOLVING CORONARY BYPASS GRAFT OF NATIVE HEART WITHOUT ANGINA PECTORIS: ICD-10-CM

## 2025-08-28 DIAGNOSIS — I48.0 PAROXYSMAL ATRIAL FIBRILLATION (HCC): ICD-10-CM

## 2025-08-28 PROCEDURE — 1123F ACP DISCUSS/DSCN MKR DOCD: CPT | Performed by: INTERNAL MEDICINE

## 2025-08-28 PROCEDURE — 1126F AMNT PAIN NOTED NONE PRSNT: CPT | Performed by: INTERNAL MEDICINE

## 2025-08-28 PROCEDURE — 3078F DIAST BP <80 MM HG: CPT | Performed by: INTERNAL MEDICINE

## 2025-08-28 PROCEDURE — 3074F SYST BP LT 130 MM HG: CPT | Performed by: INTERNAL MEDICINE

## 2025-08-28 PROCEDURE — 99214 OFFICE O/P EST MOD 30 MIN: CPT | Performed by: INTERNAL MEDICINE

## 2025-08-28 PROCEDURE — G8428 CUR MEDS NOT DOCUMENT: HCPCS | Performed by: INTERNAL MEDICINE

## 2025-08-28 PROCEDURE — 1036F TOBACCO NON-USER: CPT | Performed by: INTERNAL MEDICINE

## 2025-08-28 PROCEDURE — G8417 CALC BMI ABV UP PARAM F/U: HCPCS | Performed by: INTERNAL MEDICINE

## 2025-08-28 ASSESSMENT — ENCOUNTER SYMPTOMS
CHEST TIGHTNESS: 0
ALLERGIC/IMMUNOLOGIC NEGATIVE: 1
RESPIRATORY NEGATIVE: 1
PHOTOPHOBIA: 0
GASTROINTESTINAL NEGATIVE: 1
EYES NEGATIVE: 1
BACK PAIN: 1
SHORTNESS OF BREATH: 0
ABDOMINAL PAIN: 0
EYE PAIN: 0

## (undated) DEVICE — AIRLIFE™ OXYGEN TUBING 7 FEET (2.1 M) CRUSH RESISTANT OXYGEN TUBING, VINYL TIPPED: Brand: AIRLIFE™

## (undated) DEVICE — TUBING, SUCTION, 3/16" X 10', STRAIGHT: Brand: MEDLINE

## (undated) DEVICE — BAG SPEC REM 224ML W4XL6IN DIA10MM 1 HND GYN DISP ENDOPCH

## (undated) DEVICE — SHEARS ENDOSCP HARM 36CM ULTRASONIC CRV TIP UPGRD

## (undated) DEVICE — SUTURE PROL SZ 0 L30IN NONABSORBABLE BLU L26MM CT-2 1/2 CIR 8412H

## (undated) DEVICE — BASIC SINGLE BASIN 2-LF: Brand: MEDLINE INDUSTRIES, INC.

## (undated) DEVICE — MASTISOL ADHESIVE LIQ 2/3ML

## (undated) DEVICE — SUTURE MONOCRYL SZ 4-0 L18IN ABSRB UD L19MM PS-2 3/8 CIR PRIM Y496G

## (undated) DEVICE — LIQUIBAND RAPID ADHESIVE 36/CS 0.8ML: Brand: MEDLINE

## (undated) DEVICE — BLOCK BITE AD 60FR W/ VELC STRP ADDRESSES MOST PT AND

## (undated) DEVICE — SUTURE VCRL SZ 0 L27IN ABSRB VLT L26MM CT-2 1/2 CIR J334H

## (undated) DEVICE — INTENDED FOR TISSUE SEPARATION, AND OTHER PROCEDURES THAT REQUIRE A SHARP SURGICAL BLADE TO PUNCTURE OR CUT.: Brand: BARD-PARKER ® STAINLESS STEEL BLADES

## (undated) DEVICE — SUTURE PROL SZ 2-0 L30IN NONABSORBABLE BLU L26MM CT-2 1/2 8411H

## (undated) DEVICE — NEEDLE SYR 18GA L1.5IN RED PLAS HUB S STL BLNT FILL W/O

## (undated) DEVICE — NEEDLE HYPO 21GA L1.5IN INTRAMUSCULAR S STL LATCH BVL UP

## (undated) DEVICE — CANNULA NSL ORAL AD FOR CAPNOFLEX CO2 O2 AIRLFE

## (undated) DEVICE — ANCHOR TISSUE RETRIEVAL SYSTEM, BAG SIZE 125 ML, PORT SIZE 8 MM: Brand: ANCHOR TISSUE RETRIEVAL SYSTEM

## (undated) DEVICE — GLOVE SURG SZ 8 CRM LTX FREE POLYISOPRENE POLYMER BEAD ANTI

## (undated) DEVICE — KIT COLON WITH 1.1 OZ ORCA HYDRA SEAL 2 GOWN ADAPT SECONDARY

## (undated) DEVICE — GENERAL LAPAROSCOPY: Brand: MEDLINE INDUSTRIES, INC.

## (undated) DEVICE — SURGICAL PROCEDURE PACK BASIC ST FRANCIS

## (undated) DEVICE — STRIP,CLOSURE,WOUND,MEDI-STRIP,1/2X4: Brand: MEDLINE

## (undated) DEVICE — Device

## (undated) DEVICE — DRAPE,T,LAPARO,TRANS,STERILE: Brand: MEDLINE

## (undated) DEVICE — ELECTRODE BLDE L6.5IN CAUT EXT DISP

## (undated) DEVICE — SUTURE VICRYL + SZ 0 L27IN ABSRB VLT L26MM UR-6 5/8 CIR VCP603H

## (undated) DEVICE — TUBING INSUFFLATION SMK EVAC HI FLO SET PNEUMOCLEAR

## (undated) DEVICE — APPLICATOR MEDICATED 26 CC SOLUTION HI LT ORNG CHLORAPREP

## (undated) DEVICE — SOLUTION IRRIG 1000ML H2O PIC PLAS SHATTERPROOF CONTAINER

## (undated) DEVICE — DRESSING,GAUZE,XEROFORM,CURAD,5"X9",ST: Brand: CURAD

## (undated) DEVICE — SOLUTION IRRIG 1000ML 09% SOD CHL USP PIC PLAS CONTAINER

## (undated) DEVICE — PREMIUM WET SKIN PREP TRAY: Brand: MEDLINE INDUSTRIES, INC.

## (undated) DEVICE — TROCARS: Brand: KII® BLUNT TIP ACCESS SYSTEM

## (undated) DEVICE — TROCAR ENDOSCP L102MM DIA10MM STD CANN BLNT TIP W/ FIX BLLN

## (undated) DEVICE — STAPLER INT DIA5MM 25 ABSRB STRP FIX DISP FOR HERN MESH

## (undated) DEVICE — LUBE JELLY FOIL PACK 1.4 OZ: Brand: MEDLINE INDUSTRIES, INC.

## (undated) DEVICE — LAPAROSCOPIC TROCAR SLEEVE/SINGLE USE: Brand: KII® LOW PROFILE OPTICAL ACCESS SYSTEM

## (undated) DEVICE — DISSECTOR ENDOSCP EXTRA VW OVL DISTENSION BLLN

## (undated) DEVICE — FORCEPS BX L240CM JAW DIA2.8MM L CAP W/ NDL MIC MESH TOOTH

## (undated) DEVICE — SUTURE MONOCRYL SZ 4-0 L27IN ABSRB UD L19MM PS-2 1/2 CIR PRIM Y426H

## (undated) DEVICE — LOGICUT SCISSOR LENGTH 320MM: Brand: LOGI - LAPAROSCOPIC INSTRUMENT SYSTEM

## (undated) DEVICE — 3M™ TEGADERM™ TRANSPARENT FILM DRESSING FRAME STYLE, 1626W, 4 IN X 4-3/4 IN (10 CM X 12 CM), 50/CT 4CT/CASE: Brand: 3M™ TEGADERM™

## (undated) DEVICE — SUTURE VCRL SZ 4-0 L18IN ABSRB UD L19MM PS-2 3/8 CIR PRIM J496H

## (undated) DEVICE — GAUZE,SPONGE,4"X4",12PLY,WOVEN,NS,LF: Brand: MEDLINE

## (undated) DEVICE — YANKAUER,BULB TIP,W/O VENT,RIGID,STERILE: Brand: MEDLINE

## (undated) DEVICE — KENDALL RADIOLUCENT FOAM MONITORING ELECTRODE RECTANGULAR SHAPE: Brand: KENDALL

## (undated) DEVICE — SUTURE PERMAHAND SZ 3-0 L18IN NONABSORBABLE BLK SILK BRAID A184H

## (undated) DEVICE — SPONGE: SPECIALTY TONSIL XR MED 100/CS: Brand: MEDICAL ACTION INDUSTRIES

## (undated) DEVICE — SYRINGE, LUER SLIP, STERILE, 60ML: Brand: MEDLINE

## (undated) DEVICE — ELECTRODE LAPROSCOPIC 13 1 2IN SPAT 6 BX

## (undated) DEVICE — SYRINGE MED 30ML STD CLR PLAS LUERLOCK TIP N CTRL DISP

## (undated) DEVICE — CONTAINER FORMALIN PREFILLED 10% NBF 60ML

## (undated) DEVICE — ENDOSCOPIC KIT 1.1+ OP4 CA DE 2 GWN AAMI LEVEL 3

## (undated) DEVICE — PAD,NON-ADHERENT,3X8,STERILE,LF,1/PK: Brand: MEDLINE

## (undated) DEVICE — APPLIER CLP M/L SHFT DIA5MM 15 LIG LIGAMAX 5

## (undated) DEVICE — GLOVE SURG SZ 75 CRM LTX FREE POLYISOPRENE POLYMER BEAD ANTI

## (undated) DEVICE — SUTURE COAT VCRL SZ 4-0 L18IN ABSRB UD L19MM PS-2 1/2 CIR J496G

## (undated) DEVICE — CONNECTOR TBNG OD5-7MM O2 END DISP

## (undated) DEVICE — SUTURE ETHBND EXCEL SZ 1 L30IN NONABSORBABLE GRN L36MM CT-1 X425H

## (undated) DEVICE — INSUFFLATION NEEDLE TO ESTABLISH PNEUMOPERITONEUM.: Brand: INSUFFLATION NEEDLE

## (undated) DEVICE — ELECTRODE PT RET AD L9FT HI MOIST COND ADH HYDRGEL CORDED

## (undated) DEVICE — SUTURE VCRL + SZ 0 L27IN ABSRB WHT CT-2 1/2 CIR TAPERPOINT VCP270H

## (undated) DEVICE — MINOR SPLIT GENERAL: Brand: MEDLINE INDUSTRIES, INC.

## (undated) DEVICE — SUTURE VCRL SZ 3-0 L27IN ABSRB UD L26MM CT-2 1/2 CIR J232H

## (undated) DEVICE — PUMP SUC IRR TBNG L10FT W/ HNDPC ASSEMB STRYKEFLOW 2

## (undated) DEVICE — TROCAR: Brand: KII® SLEEVE

## (undated) DEVICE — SINGLE PORT MANIFOLD: Brand: NEPTUNE 2

## (undated) DEVICE — 3M™ TEGADERM™ TRANSPARENT FILM DRESSING FRAME STYLE, 1624W, 2-3/8 IN X 2-3/4 IN (6 CM X 7 CM), 100/CT 4CT/CASE: Brand: 3M™ TEGADERM™

## (undated) DEVICE — SUTURE VCRL SZ 3-0 L18IN ABSRB UD L26MM SH 1/2 CIR J864D

## (undated) DEVICE — GARMENT,MEDLINE,DVT,INT,CALF,MED, GEN2: Brand: MEDLINE

## (undated) DEVICE — TROCAR: Brand: KII FIOS FIRST ENTRY

## (undated) DEVICE — SYRINGE MED 10ML LUERLOCK TIP W/O SFTY DISP

## (undated) DEVICE — SYRINGE MED 3ML CLR PLAS STD N CTRL LUERLOCK TIP DISP

## (undated) DEVICE — SUTURE VICRYL ENDOLOOP SZ 0 L18IN ABSRB VLT LIG SLDE KNOT